# Patient Record
Sex: FEMALE | Race: BLACK OR AFRICAN AMERICAN | Employment: OTHER | URBAN - METROPOLITAN AREA
[De-identification: names, ages, dates, MRNs, and addresses within clinical notes are randomized per-mention and may not be internally consistent; named-entity substitution may affect disease eponyms.]

---

## 2017-01-10 ENCOUNTER — HOSPITAL ENCOUNTER (OUTPATIENT)
Dept: GENERAL RADIOLOGY | Age: 67
Discharge: HOME | End: 2017-01-10
Attending: FAMILY MEDICINE
Payer: MEDICARE

## 2017-01-10 ENCOUNTER — OFFICE VISIT (OUTPATIENT)
Dept: FAMILY MEDICINE CLINIC | Age: 67
End: 2017-01-10

## 2017-01-10 VITALS
WEIGHT: 161.4 LBS | HEIGHT: 62 IN | SYSTOLIC BLOOD PRESSURE: 125 MMHG | HEART RATE: 71 BPM | OXYGEN SATURATION: 97 % | BODY MASS INDEX: 29.7 KG/M2 | TEMPERATURE: 97 F | RESPIRATION RATE: 14 BRPM | DIASTOLIC BLOOD PRESSURE: 63 MMHG

## 2017-01-10 DIAGNOSIS — I10 ESSENTIAL HYPERTENSION: Primary | ICD-10-CM

## 2017-01-10 DIAGNOSIS — E78.00 PURE HYPERCHOLESTEROLEMIA: ICD-10-CM

## 2017-01-10 DIAGNOSIS — M25.562 LEFT KNEE PAIN, UNSPECIFIED CHRONICITY: ICD-10-CM

## 2017-01-10 DIAGNOSIS — Z00.00 MEDICARE ANNUAL WELLNESS VISIT, SUBSEQUENT: ICD-10-CM

## 2017-01-10 DIAGNOSIS — M05.79 RHEUMATOID ARTHRITIS INVOLVING MULTIPLE SITES WITH POSITIVE RHEUMATOID FACTOR (HCC): ICD-10-CM

## 2017-01-10 DIAGNOSIS — J45.20 MILD INTERMITTENT ASTHMA WITHOUT COMPLICATION: ICD-10-CM

## 2017-01-10 DIAGNOSIS — E11.9 TYPE 2 DIABETES MELLITUS WITHOUT COMPLICATION, WITHOUT LONG-TERM CURRENT USE OF INSULIN (HCC): ICD-10-CM

## 2017-01-10 DIAGNOSIS — K21.00 GASTROESOPHAGEAL REFLUX DISEASE WITH ESOPHAGITIS: ICD-10-CM

## 2017-01-10 DIAGNOSIS — E11.21 CONTROLLED TYPE 2 DIABETES MELLITUS WITH DIABETIC NEPHROPATHY, WITHOUT LONG-TERM CURRENT USE OF INSULIN (HCC): ICD-10-CM

## 2017-01-10 PROCEDURE — 73564 X-RAY EXAM KNEE 4 OR MORE: CPT

## 2017-01-10 RX ORDER — HYDROCHLOROTHIAZIDE 25 MG/1
25 TABLET ORAL DAILY
Qty: 90 TAB | Refills: 4 | Status: SHIPPED | OUTPATIENT
Start: 2017-01-10 | End: 2017-07-24 | Stop reason: SDUPTHER

## 2017-01-10 RX ORDER — LOSARTAN POTASSIUM 50 MG/1
50 TABLET ORAL DAILY
Qty: 90 TAB | Refills: 4 | Status: SHIPPED | OUTPATIENT
Start: 2017-01-10 | End: 2017-07-24 | Stop reason: SDUPTHER

## 2017-01-10 RX ORDER — SIMVASTATIN 40 MG/1
40 TABLET, FILM COATED ORAL
Qty: 90 TAB | Refills: 4 | Status: SHIPPED | OUTPATIENT
Start: 2017-01-10 | End: 2017-07-24 | Stop reason: SDUPTHER

## 2017-01-10 NOTE — PROGRESS NOTES
THE SUBSEQUENT MEDICARE ANNUAL WELLNESS VISIT PROGRESS NOTES    This is a Subsequent Medicare Annual Wellness Visit providing Personalized Prevention Plan Services (PPPS) (Performed 12 months after initial AWV and PPPS )    I have reviewed the patient's medical history in detail and updated the computerized patient record. Mitesh Raya is a 77 y.o.  female and presents for an subsequent annual wellness exam     Patient Active Problem List    Diagnosis Date Noted    Advance directive in chart 07/11/2016    Asthma 12/02/2013    Pure hypercholesterolemia 10/24/2011    GERD (gastroesophageal reflux disease) 04/29/2011    HTN (hypertension) 04/29/2011    Type 2 diabetes mellitus without complication ([facility] [address] 70/54/8001    Rheumatoid arthritis(714.0) 04/29/2011     Current Outpatient Prescriptions   Medication Sig Dispense Refill    hydrochlorothiazide (HYDRODIURIL) 25 mg tablet Take 1 Tab by mouth daily. 90 Tab 4    losartan (COZAAR) 50 mg tablet Take 1 Tab by mouth daily. 90 Tab 4    simvastatin (ZOCOR) 40 mg tablet Take 1 Tab by mouth nightly. 90 Tab 4    linagliptin (TRADJENTA) 5 mg tablet Take 1 Tab by mouth daily. 90 Tab 4    budesonide-formoterol (SYMBICORT) 160-4.5 mcg/actuation HFA inhaler Take 2 Puffs by inhalation two (2) times a day.        Allergies   Allergen Reactions    Percocet [Oxycodone-Acetaminophen] Nausea and Vomiting    Ultram [Tramadol] Swelling     Past Medical History   Diagnosis Date    Advance directive in chart 7/11/2016    Asthma 12/2/2013    GERD (gastroesophageal reflux disease) 4/29/2011    HTN (hypertension) 4/29/2011    Rheumatoid arthritis(714.0) 4/29/2011    Type II or unspecified type diabetes mellitus without mention of complication, not stated as uncontrolled 4/29/2011     Past Surgical History   Procedure Laterality Date    Hx breast biopsy Right 1970's     rt benign    Hx amor and bso  2004    Hx hysterectomy       Family History   Problem Relation Age of Onset    Hypertension Mother    Juve Winston Arthritis-rheumatoid Mother     Stroke Father      Social History   Substance Use Topics    Smoking status: Never Smoker    Smokeless tobacco: Not on file    Alcohol use No         ROS       All other systems reviewed and are negative. History     Past Medical History   Diagnosis Date    Advance directive in chart 7/11/2016    Asthma 12/2/2013    GERD (gastroesophageal reflux disease) 4/29/2011    HTN (hypertension) 4/29/2011    Rheumatoid arthritis(714.0) 4/29/2011    Type II or unspecified type diabetes mellitus without mention of complication, not stated as uncontrolled 4/29/2011      Past Surgical History   Procedure Laterality Date    Hx breast biopsy Right 1970's     rt benign    Hx amor and bso  2004    Hx hysterectomy       Current Outpatient Prescriptions   Medication Sig Dispense Refill    hydrochlorothiazide (HYDRODIURIL) 25 mg tablet Take 1 Tab by mouth daily. 90 Tab 4    losartan (COZAAR) 50 mg tablet Take 1 Tab by mouth daily. 90 Tab 4    simvastatin (ZOCOR) 40 mg tablet Take 1 Tab by mouth nightly. 90 Tab 4    linagliptin (TRADJENTA) 5 mg tablet Take 1 Tab by mouth daily. 90 Tab 4    budesonide-formoterol (SYMBICORT) 160-4.5 mcg/actuation HFA inhaler Take 2 Puffs by inhalation two (2) times a day.        Allergies   Allergen Reactions    Percocet [Oxycodone-Acetaminophen] Nausea and Vomiting    Ultram [Tramadol] Swelling     Family History   Problem Relation Age of Onset    Hypertension Mother    Juve Winston Arthritis-rheumatoid Mother     Stroke Father      Social History   Substance Use Topics    Smoking status: Never Smoker    Smokeless tobacco: Not on file    Alcohol use No     Patient Active Problem List   Diagnosis Code    GERD (gastroesophageal reflux disease) K21.9    HTN (hypertension) I10    Type 2 diabetes mellitus without complication (HCC) F15.4    Rheumatoid arthritis(714.0)     Pure hypercholesterolemia E78.00    Asthma J45.909    Advance directive in chart Z78.9       Health Maintenance History  Immunizations reviewed, dtap utd  , pneumovax utd , flu refused , zoster refused   Colonoscopy: utd ,   Chest CT : utd ,  Eye exam: utd   Mammo utd   Dexascan utd     Health Care Directive or Living Will: yes    Depression Risk Factor Screening:      Patient Health Questionnaire (PHQ-2)   Over the last 2 weeks, how often have you been bothered by any of the following problems? · Little interest or pleasure in doing things? · Not at all. [0]  · Feeling down, depressed, or hopeless? · Not at all. [0]    Total Score: 0/6  PHQ-2 Assessment Scoring:   A score of 2 or more requires further screening with the PHQ-9    Alcohol Risk Factor Screening:     Women: On any occasion during the past 3 months, have you had more than 3 drinks containing alcohol? Do you average more than 7 drinks per week? Men: On any occasion during the past 3 months, have you had more than 4 drinks containing alcohol? Do you average more than 14 drinks per week? Functional Ability and Level of Safety:     Hearing Loss    mild    Activities of Daily Living   Self-care. Requires assistance with: no ADLs    Fall Risk   No fall risk factors    Abuse Screen   None      Examination   Physical Examination  Vitals:    01/10/17 1235 01/10/17 1241   BP: 140/58 125/63   Pulse: 79 71   Resp: 14    Temp: 97 °F (36.1 °C)    TempSrc: Oral    SpO2: 97%    Weight: 161 lb 6.4 oz (73.2 kg)    Height: 5' 2\" (1.575 m)    PainSc:   0 - No pain      Body mass index is 29.52 kg/(m^2).     Evaluation of Cognitive Function:  Mood/affect:apprpriate   Appearance: well groomed   Family member/caregiver input: na     alert, well appearing, and in no distress, oriented to person, place, and time and normal appearing weight    Patient Care Team:  Julia Silva DO as PCP - General (Family Practice)  Susy Prader, MD (Rheumatology)  Tiffany Lopez MD (Internal Medicine)  Luly Montero Larissa Acuna MD (Gastroenterology)    Advice/Referrals/Counseling/Plan:   Education and counseling provided:  Are appropriate based on today's review and evaluation  Influenza Vaccine  Include in education list (weight loss, physical activity, smoking cessation, fall prevention, and nutrition)  current treatment plan is effective, no change in therapy. I have discussed the diagnosis with the patient and the intended plan as seen in the above orders. The patient has received an after-visit summary and questions were answered concerning future plans. I have discussed medication side effects and warnings with the patient as well. I have reviewed the plan of care with the patient, accepted their input and they are in agreement with the treatment goals. Follow-up Disposition: Not on File    _____________________________________________________________    Problem Assessment    for treatment of No chief complaint on file. SUBJECTIVE      Cardiovascular Review:  The patient has diabetes, hypertension and hyperlipidemia. Diet and Lifestyle: not attempting to follow a low fat, low cholesterol diet, not attempting to follow a low sodium diet  Home BP Monitoring: is not measured at home. Pertinent ROS: taking medications as instructed, no medication side effects noted, no TIA's, no chest pain on exertion, no dyspnea on exertion, no swelling of ankles. Asthma Review:  The patient is being seen for follow up of asthma, not currently in exacerbation. Asthma symptoms occur: daily, infrequently. Wheezing when present is described as mild and easily relieved with rescue bronchodilator. Current limitations in activity from asthma: none. Number of days of school or work missed in the last month: 0. Frequency of use of quick-relief meds: 0. Regimen compliance: The patient reports adherence to this regimen. Patient does not smoke cigarettes.   Osteoarthritis and Chronic Pain:  Patient has rheumatoid arthritis, primarily affecting the diffuse. Symptoms onset: problem is longstanding. Rheumatological ROS: no current joint or muscle symptoms, essentially pain-free. Response to treatment plan: stable.      Additional Concerns: 0         Mental status - alert, oriented to person, place, and time  Eyes - pupils equal and reactive, extraocular eye movements intact  Ears - bilateral TM's and external ear canals normal  Nose - normal and patent, no erythema, discharge or polyps  Mouth - mucous membranes moist, pharynx normal without lesions  Neck - supple, no significant adenopathy  Lymphatics - no palpable lymphadenopathy, no hepatosplenomegaly  Chest - clear to auscultation, no wheezes, rales or rhonchi, symmetric air entry  Heart - normal rate, regular rhythm, normal S1, S2, no murmurs, rubs, clicks or gallops  Abdomen - soft, nontender, nondistended, no masses or organomegaly  Breasts - breasts appear normal, no suspicious masses, no skin or nipple changes or axillary nodes  Back exam - full range of motion, no tenderness, palpable spasm or pain on motion  Neurological - alert, oriented, normal speech, no focal findings or movement disorder noted  Musculoskeletal - no joint tenderness, deformity or swelling  Extremities - peripheral pulses normal, no pedal edema, no clubbing or cyanosis  Skin - normal coloration and turgor, no rashes, no suspicious skin lesions noted  Diabetic foot exam:     Left: Reflexes 2+     Vibratory sensation normal    Proprioception normal   Sharp/dull discrimination normal    Filament test normal sensation with micro filament   Pulse DP: 2+ (normal)   Pulse PT: 2+ (normal)   Deformities: None  Right: Reflexes 2+   Vibratory sensation normal   Proprioception normal   Sharp/dull discrimination normal   Filament test normal sensation with micro filament   Pulse DP: 2+ (normal)   Pulse PT: 2+ (normal)   Deformities: None      LABS   Component      Latest Ref Rng & Units 12/15/2016 12/15/2016 12/15/2016          11:37 AM 11:36 AM 11:36 AM   WBC      4.6 - 13.2 K/uL      RBC      4.20 - 5.30 M/uL      HGB      12.0 - 16.0 g/dL      HCT      35.0 - 45.0 %      MCV      74.0 - 97.0 FL      MCH      24.0 - 34.0 PG      MCHC      31.0 - 37.0 g/dL      RDW      11.6 - 14.5 %      PLATELET      840 - 413 K/uL      MPV      9.2 - 11.8 FL      NEUTROPHILS      40 - 73 %      LYMPHOCYTES      21 - 52 %      MONOCYTES      3 - 10 %      EOSINOPHILS      0 - 5 %      BASOPHILS      0 - 2 %      ABS. NEUTROPHILS      1.8 - 8.0 K/UL      ABS. LYMPHOCYTES      0.9 - 3.6 K/UL      ABS. MONOCYTES      0.05 - 1.2 K/UL      ABS. EOSINOPHILS      0.0 - 0.4 K/UL      ABS. BASOPHILS      0.0 - 0.06 K/UL      DF            Sodium      136 - 145 mmol/L      Potassium      3.5 - 5.5 mmol/L      Chloride      100 - 108 mmol/L      CO2      21 - 32 mmol/L      Anion gap      3.0 - 18 mmol/L      Glucose      74 - 99 mg/dL      BUN      7.0 - 18 MG/DL      Creatinine      0.6 - 1.3 MG/DL      BUN/Creatinine ratio      12 - 20        GFR est AA      >60 ml/min/1.73m2      GFR est non-AA      >60 ml/min/1.73m2      Calcium      8.5 - 10.1 MG/DL      Bilirubin, total      0.2 - 1.0 MG/DL      ALT      13 - 56 U/L      AST      15 - 37 U/L      Alk.  phosphatase      45 - 117 U/L      Protein, total      6.4 - 8.2 g/dL      Albumin      3.4 - 5.0 g/dL      Globulin      2.0 - 4.0 g/dL      A-G Ratio      0.8 - 1.7        Color            Appearance            Specific gravity      1.005 - 1.030        pH (UA)      5.0 - 8.0        Protein      NEG mg/dL      Glucose      NEG mg/dL      Ketone      NEG mg/dL      Bilirubin      NEG        Blood      NEG        Urobilinogen      0.2 - 1.0 EU/dL      Nitrites      NEG        Leukocyte Esterase      NEG        Cholesterol, total      <200 MG/DL      Triglyceride      <150 MG/DL      HDL Cholesterol      40 - 60 MG/DL      LDL, calculated      0 - 100 MG/DL      VLDL, calculated MG/DL      CHOL/HDL Ratio      0 - 5.0        WBC      0 - 4 /hpf   4 to 8   RBC      0 - 5 /hpf   0   Epithelial cells      0 - 5 /lpf   1+   Bacteria      NEG /hpf   NEGATIVE   Microalbumin,urine random      0 - 3.0 MG/DL 1.20     Creatinine, urine      30 - 125 mg/dL 218.89 (H)     Microalbumin/Creat. Ratio      0 - 30 mg/g 5     Hemoglobin A1c, (calculated)      4.2 - 5.6 %      Est. average glucose      mg/dL      TSH      0.36 - 3.74 uIU/mL      Calcitriol (Vit D 1, 25 di-OH)      19.9 - 79.3 pg/mL  59.7      Component      Latest Ref Rng & Units 12/15/2016 12/15/2016 12/15/2016          11:36 AM 11:36 AM 11:36 AM   WBC      4.6 - 13.2 K/uL      RBC      4.20 - 5.30 M/uL      HGB      12.0 - 16.0 g/dL      HCT      35.0 - 45.0 %      MCV      74.0 - 97.0 FL      MCH      24.0 - 34.0 PG      MCHC      31.0 - 37.0 g/dL      RDW      11.6 - 14.5 %      PLATELET      846 - 317 K/uL      MPV      9.2 - 11.8 FL      NEUTROPHILS      40 - 73 %      LYMPHOCYTES      21 - 52 %      MONOCYTES      3 - 10 %      EOSINOPHILS      0 - 5 %      BASOPHILS      0 - 2 %      ABS. NEUTROPHILS      1.8 - 8.0 K/UL      ABS. LYMPHOCYTES      0.9 - 3.6 K/UL      ABS. MONOCYTES      0.05 - 1.2 K/UL      ABS. EOSINOPHILS      0.0 - 0.4 K/UL      ABS. BASOPHILS      0.0 - 0.06 K/UL      DF            Sodium      136 - 145 mmol/L      Potassium      3.5 - 5.5 mmol/L      Chloride      100 - 108 mmol/L      CO2      21 - 32 mmol/L      Anion gap      3.0 - 18 mmol/L      Glucose      74 - 99 mg/dL      BUN      7.0 - 18 MG/DL      Creatinine      0.6 - 1.3 MG/DL      BUN/Creatinine ratio      12 - 20        GFR est AA      >60 ml/min/1.73m2      GFR est non-AA      >60 ml/min/1.73m2      Calcium      8.5 - 10.1 MG/DL      Bilirubin, total      0.2 - 1.0 MG/DL      ALT      13 - 56 U/L      AST      15 - 37 U/L      Alk.  phosphatase      45 - 117 U/L      Protein, total      6.4 - 8.2 g/dL      Albumin      3.4 - 5.0 g/dL Globulin      2.0 - 4.0 g/dL      A-G Ratio      0.8 - 1.7        Color       YELLOW     Appearance       CLEAR     Specific gravity      1.005 - 1.030   1.020     pH (UA)      5.0 - 8.0   7.0     Protein      NEG mg/dL NEGATIVE     Glucose      NEG mg/dL NEGATIVE     Ketone      NEG mg/dL NEGATIVE     Bilirubin      NEG   NEGATIVE     Blood      NEG   NEGATIVE     Urobilinogen      0.2 - 1.0 EU/dL 0.2     Nitrites      NEG   NEGATIVE     Leukocyte Esterase      NEG   MODERATE (A)     Cholesterol, total      <200 MG/DL      Triglyceride      <150 MG/DL      HDL Cholesterol      40 - 60 MG/DL      LDL, calculated      0 - 100 MG/DL      VLDL, calculated      MG/DL      CHOL/HDL Ratio      0 - 5.0        WBC      0 - 4 /hpf      RBC      0 - 5 /hpf      Epithelial cells      0 - 5 /lpf      Bacteria      NEG /hpf      Microalbumin,urine random      0 - 3.0 MG/DL      Creatinine, urine      30 - 125 mg/dL      Microalbumin/Creat. Ratio      0 - 30 mg/g      Hemoglobin A1c, (calculated)      4.2 - 5.6 %  6.0 (H)    Est. average glucose      mg/dL  126    TSH      0.36 - 3.74 uIU/mL   0.91   Calcitriol (Vit D 1, 25 di-OH)      19.9 - 79.3 pg/mL        Component      Latest Ref Rng & Units 12/15/2016 12/15/2016 12/15/2016          11:36 AM 11:36 AM 11:36 AM   WBC      4.6 - 13.2 K/uL   4.4 (L)   RBC      4.20 - 5.30 M/uL   4.17 (L)   HGB      12.0 - 16.0 g/dL   12.5   HCT      35.0 - 45.0 %   37.9   MCV      74.0 - 97.0 FL   90.9   MCH      24.0 - 34.0 PG   30.0   MCHC      31.0 - 37.0 g/dL   33.0   RDW      11.6 - 14.5 %   13.2   PLATELET      576 - 667 K/uL   237   MPV      9.2 - 11.8 FL   9.2   NEUTROPHILS      40 - 73 %   54   LYMPHOCYTES      21 - 52 %   35   MONOCYTES      3 - 10 %   7   EOSINOPHILS      0 - 5 %   3   BASOPHILS      0 - 2 %   1   ABS. NEUTROPHILS      1.8 - 8.0 K/UL   2.4   ABS. LYMPHOCYTES      0.9 - 3.6 K/UL   1.6   ABS. MONOCYTES      0.05 - 1.2 K/UL   0.3   ABS.  EOSINOPHILS      0.0 - 0.4 K/UL 0. 1   ABS. BASOPHILS      0.0 - 0.06 K/UL   0.0   DF         AUTOMATED   Sodium      136 - 145 mmol/L  139    Potassium      3.5 - 5.5 mmol/L  4.2    Chloride      100 - 108 mmol/L  102    CO2      21 - 32 mmol/L  32    Anion gap      3.0 - 18 mmol/L  5    Glucose      74 - 99 mg/dL  89    BUN      7.0 - 18 MG/DL  15    Creatinine      0.6 - 1.3 MG/DL  1.00    BUN/Creatinine ratio      12 - 20    15    GFR est AA      >60 ml/min/1.73m2  >60    GFR est non-AA      >60 ml/min/1.73m2  55 (L)    Calcium      8.5 - 10.1 MG/DL  8.9    Bilirubin, total      0.2 - 1.0 MG/DL  0.5    ALT      13 - 56 U/L  20    AST      15 - 37 U/L  20    Alk. phosphatase      45 - 117 U/L  70    Protein, total      6.4 - 8.2 g/dL  7.8    Albumin      3.4 - 5.0 g/dL  4.1    Globulin      2.0 - 4.0 g/dL  3.7    A-G Ratio      0.8 - 1.7    1.1    Color            Appearance            Specific gravity      1.005 - 1.030        pH (UA)      5.0 - 8.0        Protein      NEG mg/dL      Glucose      NEG mg/dL      Ketone      NEG mg/dL      Bilirubin      NEG        Blood      NEG        Urobilinogen      0.2 - 1.0 EU/dL      Nitrites      NEG        Leukocyte Esterase      NEG        Cholesterol, total      <200 MG/     Triglyceride      <150 MG/DL 74     HDL Cholesterol      40 - 60 MG/DL 52     LDL, calculated      0 - 100 MG/DL 53.2     VLDL, calculated      MG/DL 14.8     CHOL/HDL Ratio      0 - 5.0   2.3     WBC      0 - 4 /hpf      RBC      0 - 5 /hpf      Epithelial cells      0 - 5 /lpf      Bacteria      NEG /hpf      Microalbumin,urine random      0 - 3.0 MG/DL      Creatinine, urine      30 - 125 mg/dL      Microalbumin/Creat.  Ratio      0 - 30 mg/g      Hemoglobin A1c, (calculated)      4.2 - 5.6 %      Est. average glucose      mg/dL      TSH      0.36 - 3.74 uIU/mL      Calcitriol (Vit D 1, 25 di-OH)      19.9 - 79.3 pg/mL        TESTS  EKG  NSR  sjpirometry with mod severe obstructive dz      Assessment/Plan:      Diabetes - stable  Hypertension - stable  Hyperlipidemia - stable  Asthma ongoing  Rheumatoid arthritsi ongoing  Left knee pain -- will check x ray and f/u  2 oer 3 wks            Lab review: labs are reviewed, up to date and normal

## 2017-01-10 NOTE — MR AVS SNAPSHOT
Visit Information Date & Time Provider Department Dept. Phone Encounter #  
 1/10/2017 12:30 PM Ellen Zurita., [address] [phone] [id number] Follow-up Instructions Return in about 2 weeks (around 1/24/2017) for rov. Upcoming Health Maintenance Date Due  
 FOOT EXAM Q1 9/30/2016 Pneumococcal 65+ Low/Medium Risk (2 of 2 - PPSV23) 10/24/2016 EYE EXAM RETINAL OR DILATED Q1 11/5/2016 MEDICARE YEARLY EXAM 1/4/2017 HEMOGLOBIN A1C Q6M 6/15/2017 GLAUCOMA SCREENING Q2Y 11/5/2017 MICROALBUMIN Q1 12/15/2017 LIPID PANEL Q1 12/15/2017 BREAST CANCER SCRN MAMMOGRAM 4/18/2018 DTaP/Tdap/Td series (2 - Td) 10/24/2021 Allergies as of 1/10/2017  Review Complete On: 1/10/2017 By: Ellen Zurita., DO Severity Noted Reaction Type Reactions Percocet [Oxycodone-acetaminophen]  01/24/2012    Nausea and Vomiting Ultram [Tramadol]  04/29/2011    Swelling Current Immunizations  Reviewed on 12/16/2014 Name Date Influenza High Dose Vaccine PF 9/30/2015 Influenza Vaccine 12/16/2014, 12/2/2013  1:02 PM  
 Influenza Vaccine Split 11/27/2012, 10/24/2011 Pneumococcal Conjugate (PCV-13) 9/30/2015 Pneumococcal Vaccine (Unspecified Type) 10/24/2011 TDAP Vaccine 10/24/2011 Not reviewed this visit You Were Diagnosed With   
  
 Codes Comments Essential hypertension    -  Primary ICD-10-CM: I10 
ICD-9-CM: 401.9 Medicare annual wellness visit, subsequent     ICD-10-CM: Z00.00 ICD-9-CM: V70.0 Mild intermittent asthma without complication     TT-[id number]: J45.20 ICD-9-CM: 493.90 Type 2 diabetes mellitus without complication, without long-term current use of insulin (HCC)     ICD-10-CM: E11.9 ICD-9-CM: 250.00 Gastroesophageal reflux disease with esophagitis     ICD-10-CM: K21.0 ICD-9-CM: 530.11  Rheumatoid arthritis involving multiple sites with positive rheumatoid factor (HCC)     ICD-10-CM: M05.79 
 ICD-9-CM: 714.0 Left knee pain, unspecified chronicity     ICD-10-CM: E36.291 ICD-9-CM: 719.46 Controlled type 2 diabetes mellitus with diabetic nephropathy, without long-term current use of insulin (HCC)     ICD-10-CM: E11.21 
ICD-9-CM: 250.40, 583.81 Pure hypercholesterolemia     ICD-10-CM: E78.00 ICD-9-CM: 272.0 Vitals BP Pulse Temp Resp Height(growth percentile) Weight(growth percentile) 125/63 (BP 1 Location: Right arm, BP Patient Position: Sitting) 71 97 °F (36.1 °C) (Oral) 14 5' 2\" (1.575 m) 161 lb 6.4 oz (73.2 kg) SpO2 BMI OB Status Smoking Status 97% 29.52 kg/m2 Hysterectomy Never Smoker Vitals History BMI and BSA Data Body Mass Index Body Surface Area  
 29.52 kg/m 2 1.79 m 2 Preferred Pharmacy Pharmacy Name Phone [facility] [address], [address] [facility] [phone] Your Updated Medication List  
  
   
This list is accurate as of: 1/10/17  1:31 PM.  Always use your most recent med list.  
  
  
  
  
 budesonide-formoterol 160-4.5 mcg/actuation HFA inhaler Commonly known as:  SYMBICORT Take 2 Puffs by inhalation two (2) times a day. hydroCHLOROthiazide 25 mg tablet Commonly known as:  HYDRODIURIL Take 1 Tab by mouth daily. linagliptin 5 mg tablet Commonly known as:  Katelin Addison Take 1 Tab by mouth daily. losartan 50 mg tablet Commonly known as:  COZAAR Take 1 Tab by mouth daily. simvastatin 40 mg tablet Commonly known as:  ZOCOR Take 1 Tab by mouth nightly. Prescriptions Sent to Pharmacy Refills  
 hydroCHLOROthiazide (HYDRODIURIL) 25 mg tablet 4 Sig: Take 1 Tab by mouth daily. Class: Normal  
 Pharmacy: [facility] [address], [address] Ph #: [phone] Route: Oral  
 losartan (COZAAR) 50 mg tablet 4 Sig: Take 1 Tab by mouth daily.   
 Class: Normal  
 Pharmacy: [id number], [address] Ph #: [phone] Route: Oral  
 simvastatin (ZOCOR) 40 mg tablet 4 Sig: Take 1 Tab by mouth nightly. Class: Normal  
 Pharmacy: [address], [address] Ph #: [phone] Route: Oral  
 linagliptin (TRADJENTA) 5 mg tablet 4 Sig: Take 1 Tab by mouth daily. Class: Normal  
 Pharmacy: [address], [address] Ph #: [phone] Route: Oral  
  
We Performed the Following AMB POC EKG ROUTINE W/ 12 LEADS, INTER & REP [74486 CPT(R)] Comments:  
 Verbal order with read back per Dr. Diane Mitchell request  
 AMB POC SPIROMETRY W/O BRONCHODILATOR [63681 CPT(R)]  DIABETES FOOT EXAM [HM7 Custom] Follow-up Instructions Return in about 2 weeks (around 1/24/2017) for rov. To-Do List   
 01/10/2017 Imaging:  XR KNEE LT MIN 4 V   
  
 04/19/2017 11:30 AM  
  Appointment with [facility] 1 at [facility] ([phone]) OUTSIDE FILMS  - Any outside films related to the study being scheduled should be brought with you on the day of the exam.  If this cannot be done there may be a delay in the reading of the study. MEDICATIONS  - Patient must bring a complete list of all medications currently taking to include prescriptions, over-the-counter meds, herbals, vitamins & any dietary supplements  GENERAL INSTRUCTIONS  - On the day of your exam do not use any bath powder, deodorant or lotions on the armpit area. -Tenderness of breasts may cause an increase of discomfort during procedure. If you are experiencing breast tenderness on the day of your appointment and would like to reschedule, please call [phone]. Introducing [facility] [place] & HEALTH SERVICES! [employer] introduces [employer] patient portal. Now you can access parts of your medical record, email your doctor's office, and request medication refills online. 1. In your internet browser, go to [email or web address] 2. Click on the First Time User? Click Here link in the Sign In box. You will see the New Member Sign Up page. 3. Enter your Twylah Access Code exactly as it appears below. You will not need to use this code after youve completed the sign-up process. If you do not sign up before the expiration date, you must request a new code. · Twylah Access Code: [email or web address] Expires: 3/6/2017  2:47 PM 
 
4. Enter the last four digits of your Social Security Number (xxxx) and Date of Birth (mm/dd/yyyy) as indicated and click Submit. You will be taken to the next sign-up page. 5. Create a MeSixtyt ID. This will be your Twylah login ID and cannot be changed, so think of one that is secure and easy to remember. 6. Create a Twylah password. You can change your password at any time. 7. Enter your Password Reset Question and Answer. This can be used at a later time if you forget your password. 8. Enter your e-mail address. You will receive e-mail notification when new information is available in [address] 9. Click Sign Up. You can now view and download portions of your medical record. 10. Click the Download Summary menu link to download a portable copy of your medical information. If you have questions, please visit the Frequently Asked Questions section of the Twylah website. Remember, Twylah is NOT to be used for urgent needs. For medical emergencies, dial 911. Now available from your iPhone and Android! Please provide this summary of care documentation to your next provider. Your primary care clinician is listed as [address]. If you have any questions after today's visit, please call [phone].

## 2017-01-24 ENCOUNTER — OFFICE VISIT (OUTPATIENT)
Dept: FAMILY MEDICINE CLINIC | Age: 67
End: 2017-01-24

## 2017-01-24 VITALS
SYSTOLIC BLOOD PRESSURE: 150 MMHG | BODY MASS INDEX: 30.91 KG/M2 | TEMPERATURE: 97.1 F | OXYGEN SATURATION: 95 % | WEIGHT: 168 LBS | DIASTOLIC BLOOD PRESSURE: 57 MMHG | RESPIRATION RATE: 16 BRPM | HEIGHT: 62 IN | HEART RATE: 69 BPM

## 2017-01-24 DIAGNOSIS — M19.90 ARTHRITIS: Primary | ICD-10-CM

## 2017-01-24 DIAGNOSIS — Z23 ENCOUNTER FOR IMMUNIZATION: ICD-10-CM

## 2017-01-24 NOTE — PROGRESS NOTES
Veena Pope is a 77 y.o. female who presents for routine immunizations. She denies any symptoms , reactions or allergies that would exclude them from being immunized today. Risks and adverse reactions were discussed and the VIS was given to them. All questions were addressed. She was observed for 15 min post injection. There were no reactions observed.     Daren Patrick LPN

## 2017-01-24 NOTE — PROGRESS NOTES
Santosh Acosta is a 77 y.o.  female and presents with    Chief Complaint   Patient presents with    Knee Pain           Subjective:    Osteoarthritis and Chronic Pain:  Patient has osteoarthritis, primarily affecting the left knee   Symptoms onset: problem is longstanding. Rheumatological ROS: no current joint or muscle symptoms, essentially pain-free. Response to treatment plan: stable. Additional Concerns:          Patient Active Problem List    Diagnosis Date Noted    Advance directive in chart 07/11/2016    Asthma 12/02/2013    Pure hypercholesterolemia 10/24/2011    GERD (gastroesophageal reflux disease) 04/29/2011    HTN (hypertension) 04/29/2011    Type [facility] 47/74/3696    Rheumatoid arthritis involving multiple sites with positive rheumatoid factor ([facility] 04/29/2011     Current Outpatient Prescriptions   Medication Sig Dispense Refill    hydroCHLOROthiazide (HYDRODIURIL) 25 mg tablet Take 1 Tab by mouth daily. 90 Tab 4    losartan (COZAAR) 50 mg tablet Take 1 Tab by mouth daily. 90 Tab 4    simvastatin (ZOCOR) 40 mg tablet Take 1 Tab by mouth nightly. 90 Tab 4    linagliptin (TRADJENTA) 5 mg tablet Take 1 Tab by mouth daily. 90 Tab 4    budesonide-formoterol (SYMBICORT) 160-4.5 mcg/actuation HFA inhaler Take 2 Puffs by inhalation two (2) times a day.        Allergies   Allergen Reactions    Percocet [Oxycodone-Acetaminophen] Nausea and Vomiting    Ultram [Tramadol] Swelling     Past Medical History   Diagnosis Date    Advance directive in chart 7/11/2016    Asthma 12/2/2013    GERD (gastroesophageal reflux disease) 4/29/2011    HTN (hypertension) 4/29/2011    Rheumatoid arthritis(714.0) 4/29/2011    Type II or unspecified type diabetes mellitus without mention of complication, not stated as uncontrolled 4/29/2011     Past Surgical History   Procedure Laterality Date    Hx breast biopsy Right 1970's     rt benign    Hx amor and bso  2004    Hx hysterectomy       Family History   Problem Relation Age of Onset    Hypertension Mother    [employer] Arthritis-rheumatoid Mother     Stroke Father      Social History   Substance Use Topics    Smoking status: Never Smoker    Smokeless tobacco: Never Used    Alcohol use No       ROS       All other systems reviewed and are negative. Objective:  Vitals:    01/24/17 1025   BP: 150/57   Pulse: 69   Resp: 16   Temp: 97.1 °F (36.2 °C)   TempSrc: Oral   SpO2: 95%   Weight: 168 lb (76.2 kg)   Height: 5' 2\" (1.575 m)   PainSc:   0 - No pain                 alert, well appearing, and in no distress, oriented to person, place, and time and normal appearing weight  Musculoskeletal - no joint tenderness, deformity or swelling        LABS     TESTS  Xray reviewed with pt  No significant deg changes in knee  Assessment/Plan:    Osteoarthritis mild knee  Avoid trauma. Doesn't want pain meds at this time      Lab review:       I have discussed the diagnosis with the patient and the intended plan as seen in the above orders. The patient has received an after-visit summary and questions were answered concerning future plans. I have discussed medication side effects and warnings with the patient as well. I have reviewed the plan of care with the patient, accepted their input and they are in agreement with the treatment goals.      Follow-up Disposition: Not on File

## 2017-01-24 NOTE — MR AVS SNAPSHOT
Visit Information Date & Time Provider Department Dept. Phone Encounter #  
 1/24/2017 10:30 AM Sunny Rayo [phone] [id number] Follow-up Instructions Return in about 6 months (around 7/24/2017) for rov, [facility] next visit. Follow-up and Disposition History Upcoming Health Maintenance Date Due Pneumococcal 65+ Low/Medium Risk (2 of 2 - PPSV23) 10/24/2016 EYE EXAM RETINAL OR DILATED Q1 11/5/2016 HEMOGLOBIN A1C Q6M 6/15/2017 GLAUCOMA SCREENING Q2Y 11/5/2017 MICROALBUMIN Q1 12/15/2017 LIPID PANEL Q1 12/15/2017 FOOT EXAM Q1 1/10/2018 MEDICARE YEARLY EXAM 1/11/2018 BREAST CANCER SCRN MAMMOGRAM 4/18/2018 DTaP/Tdap/Td series (2 - Td) 10/24/2021 Allergies as of 1/24/2017  Review Complete On: 1/24/2017 By: Sim Hollis., DO Severity Noted Reaction Type Reactions Percocet [Oxycodone-acetaminophen]  01/24/2012    Nausea and Vomiting Ultram [Tramadol]  04/29/2011    Swelling Current Immunizations  Reviewed on 12/16/2014 Name Date Influenza High Dose Vaccine PF 9/30/2015 Influenza Vaccine 12/16/2014, 12/2/2013  1:02 PM  
 Influenza Vaccine Split 11/27/2012, 10/24/2011 Pneumococcal Conjugate (PCV-13) 9/30/2015 Pneumococcal Vaccine (Unspecified Type) 10/24/2011 TDAP Vaccine 10/24/2011 Not reviewed this visit You Were Diagnosed With   
  
 Codes Comments Arthritis    -  Primary ICD-10-CM: M19.90 ICD-9-CM: 716.90 Vitals BP Pulse Temp Resp Height(growth percentile) Weight(growth percentile) 150/57 (BP 1 Location: Left arm, BP Patient Position: Sitting) 69 97.1 °F (36.2 °C) (Oral) 16 5' 2\" (1.575 m) 168 lb (76.2 kg) SpO2 BMI OB Status Smoking Status 95% 30.73 kg/m2 Hysterectomy Never Smoker BMI and BSA Data Body Mass Index Body Surface Area 30.73 kg/m 2 1.83 m 2 Preferred Pharmacy Pharmacy Name Phone [facility] [address], [place] [phone] Your Updated Medication List  
  
   
This list is accurate as of: 1/24/17 10:36 AM.  Always use your most recent med list.  
  
  
  
  
 budesonide-formoterol 160-4.5 mcg/actuation HFA inhaler Commonly known as:  SYMBICORT Take 2 Puffs by inhalation two (2) times a day. hydroCHLOROthiazide 25 mg tablet Commonly known as:  HYDRODIURIL Take 1 Tab by mouth daily. linagliptin 5 mg tablet Commonly known as:  Neita Salle Take 1 Tab by mouth daily. losartan 50 mg tablet Commonly known as:  COZAAR Take 1 Tab by mouth daily. simvastatin 40 mg tablet Commonly known as:  ZOCOR Take 1 Tab by mouth nightly. Follow-up Instructions Return in about 6 months (around 7/24/2017) for rov, [facility] next visit. To-Do List   
 04/19/2017 11:30 AM  
  Appointment with [facility] 1 at [facility] ([phone]) OUTSIDE FILMS  - Any outside films related to the study being scheduled should be brought with you on the day of the exam.  If this cannot be done there may be a delay in the reading of the study. MEDICATIONS  - Patient must bring a complete list of all medications currently taking to include prescriptions, over-the-counter meds, herbals, vitamins & any dietary supplements  GENERAL INSTRUCTIONS  - On the day of your exam do not use any bath powder, deodorant or lotions on the armpit area. -Tenderness of breasts may cause an increase of discomfort during procedure. If you are experiencing breast tenderness on the day of your appointment and would like to reschedule, please call [phone]. Introducing [facility] & HEALTH SERVICES! Romayne Duster introduces [employer] patient portal. Now you can access parts of your medical record, email your doctor's office, and request medication refills online. 1. In your internet browser, go to [email or web address] 2. Click on the First Time User? Click Here link in the Sign In box. You will see the New Member Sign Up page. 3. Enter your [employer] Access Code exactly as it appears below. You will not need to use this code after youve completed the sign-up process. If you do not sign up before the expiration date, you must request a new code. · [employer] Access Code: [email or web address] Expires: 3/6/2017  2:47 PM 
 
4. Enter the last four digits of your Social Security Number (xxxx) and Date of Birth (mm/dd/yyyy) as indicated and click Submit. You will be taken to the next sign-up page. 5. Create a [employer] ID. This will be your [employer] login ID and cannot be changed, so think of one that is secure and easy to remember. 6. Create a [employer] password. You can change your password at any time. 7. Enter your Password Reset Question and Answer. This can be used at a later time if you forget your password. 8. Enter your e-mail address. You will receive e-mail notification when new information is available in [address] 9. Click Sign Up. You can now view and download portions of your medical record. 10. Click the Download Summary menu link to download a portable copy of your medical information. If you have questions, please visit the Frequently Asked Questions section of the Atl[employer] website. Remember, Atl[employer]ian is NOT to be used for urgent needs. For medical emergencies, dial 911. Now available from your iPhone and Android! Please provide this summary of care documentation to your next provider. Your primary care clinician is listed as [address]. If you have any questions after today's visit, please call [phone].

## 2017-01-24 NOTE — PROGRESS NOTES
1. Have you been to the ER, urgent care clinic since your last visit? Hospitalized since your last visit? No    2. Have you seen or consulted any other health care providers outside of the [address] [place] [address] since your last visit? Include any pap smears or colon screening.  No

## 2017-04-19 ENCOUNTER — HOSPITAL ENCOUNTER (OUTPATIENT)
Dept: MAMMOGRAPHY | Age: 67
Discharge: HOME | End: 2017-04-19
Attending: FAMILY MEDICINE
Payer: MEDICARE

## 2017-04-19 DIAGNOSIS — Z12.31 VISIT FOR SCREENING MAMMOGRAM: ICD-10-CM

## 2017-04-19 PROCEDURE — 77063 BREAST TOMOSYNTHESIS BI: CPT

## 2017-05-04 ENCOUNTER — HOSPITAL ENCOUNTER (OUTPATIENT)
Dept: CT IMAGING | Age: 67
Discharge: HOME | End: 2017-05-04
Attending: INTERNAL MEDICINE
Payer: MEDICARE

## 2017-05-04 DIAGNOSIS — J98.4 DISORDER OF LUNG: ICD-10-CM

## 2017-05-04 PROCEDURE — 71250 CT THORAX DX C-: CPT

## 2017-05-24 ENCOUNTER — HOSPITAL ENCOUNTER (OUTPATIENT)
Dept: NON INVASIVE DIAGNOSTICS | Age: 67
Discharge: HOME | End: 2017-05-24
Attending: INTERNAL MEDICINE
Payer: MEDICARE

## 2017-05-24 DIAGNOSIS — R06.00 DYSPNEA, UNSPECIFIED: ICD-10-CM

## 2017-05-24 PROCEDURE — 93306 TTE W/DOPPLER COMPLETE: CPT

## 2017-07-24 ENCOUNTER — OFFICE VISIT (OUTPATIENT)
Dept: FAMILY MEDICINE CLINIC | Age: 67
End: 2017-07-24

## 2017-07-24 ENCOUNTER — TELEPHONE (OUTPATIENT)
Dept: PULMONOLOGY | Age: 67
End: 2017-07-24

## 2017-07-24 VITALS
OXYGEN SATURATION: 93 % | HEART RATE: 72 BPM | SYSTOLIC BLOOD PRESSURE: 139 MMHG | WEIGHT: 171.4 LBS | BODY MASS INDEX: 31.54 KG/M2 | RESPIRATION RATE: 16 BRPM | TEMPERATURE: 97.5 F | HEIGHT: 62 IN | DIASTOLIC BLOOD PRESSURE: 70 MMHG

## 2017-07-24 DIAGNOSIS — E11.21 CONTROLLED TYPE 2 DIABETES MELLITUS WITH DIABETIC NEPHROPATHY, WITHOUT LONG-TERM CURRENT USE OF INSULIN (HCC): ICD-10-CM

## 2017-07-24 DIAGNOSIS — M81.0 AGE-RELATED OSTEOPOROSIS WITHOUT CURRENT PATHOLOGICAL FRACTURE: ICD-10-CM

## 2017-07-24 DIAGNOSIS — K21.00 GASTROESOPHAGEAL REFLUX DISEASE WITH ESOPHAGITIS: ICD-10-CM

## 2017-07-24 DIAGNOSIS — I10 ESSENTIAL HYPERTENSION: ICD-10-CM

## 2017-07-24 DIAGNOSIS — Z12.31 ENCOUNTER FOR SCREENING MAMMOGRAM FOR MALIGNANT NEOPLASM OF BREAST: ICD-10-CM

## 2017-07-24 DIAGNOSIS — E11.9 TYPE 2 DIABETES MELLITUS WITHOUT COMPLICATION, UNSPECIFIED LONG TERM INSULIN USE STATUS: Primary | ICD-10-CM

## 2017-07-24 DIAGNOSIS — E78.00 PURE HYPERCHOLESTEROLEMIA: ICD-10-CM

## 2017-07-24 DIAGNOSIS — M05.79 RHEUMATOID ARTHRITIS INVOLVING MULTIPLE SITES WITH POSITIVE RHEUMATOID FACTOR (HCC): ICD-10-CM

## 2017-07-24 LAB — HBA1C MFR BLD HPLC: 6 %

## 2017-07-24 RX ORDER — SIMVASTATIN 40 MG/1
40 TABLET, FILM COATED ORAL
Qty: 90 TAB | Refills: 4 | Status: SHIPPED | OUTPATIENT
Start: 2017-07-24 | End: 2018-01-23 | Stop reason: SDUPTHER

## 2017-07-24 RX ORDER — LOSARTAN POTASSIUM 50 MG/1
50 TABLET ORAL DAILY
Qty: 90 TAB | Refills: 4 | Status: SHIPPED | OUTPATIENT
Start: 2017-07-24 | End: 2018-01-23 | Stop reason: SDUPTHER

## 2017-07-24 RX ORDER — HYDROCHLOROTHIAZIDE 25 MG/1
25 TABLET ORAL DAILY
Qty: 90 TAB | Refills: 4 | Status: SHIPPED | OUTPATIENT
Start: 2017-07-24 | End: 2018-01-23 | Stop reason: SDUPTHER

## 2017-07-24 NOTE — MR AVS SNAPSHOT
Visit Information Date & Time Provider Department Dept. Phone Encounter #  
 7/24/2017 10:30 AM Ryan Bonilla, [address] [facility]  [phone] Follow-up Instructions Return in about 6 months (around 1/24/2018) for physical, labs at next visit, EKG next visit, spirometry next visit, Michael Nano next visit, mammo prior, . Upcoming Health Maintenance Date Due Pneumococcal 65+ Low/Medium Risk (2 of 2 - PPSV23) 10/24/2016 EYE EXAM RETINAL OR DILATED Q1 11/5/2016 HEMOGLOBIN A1C Q6M 6/15/2017 INFLUENZA AGE 9 TO ADULT 8/1/2017 GLAUCOMA SCREENING Q2Y 11/5/2017 MICROALBUMIN Q1 12/15/2017 LIPID PANEL Q1 12/15/2017 FOOT EXAM Q1 1/10/2018 MEDICARE YEARLY EXAM 1/11/2018 BREAST CANCER SCRN MAMMOGRAM 4/19/2019 DTaP/Tdap/Td series (2 - Td) 10/24/2021 Allergies as of 7/24/2017  Review Complete On: 7/24/2017 By: Ryan Bonilla, DO Severity Noted Reaction Type Reactions Percocet [Oxycodone-acetaminophen]  01/24/2012    Nausea and Vomiting Ultram [Tramadol]  04/29/2011    Swelling Current Immunizations  Reviewed on 12/16/2014 Name Date Influenza High Dose Vaccine PF 1/24/2017, 9/30/2015 Influenza Vaccine 12/16/2014, 12/2/2013  1:02 PM  
 Influenza Vaccine Split 11/27/2012, 10/24/2011 Pneumococcal Conjugate (PCV-13) 9/30/2015 Pneumococcal Vaccine (Unspecified Type) 10/24/2011 TDAP Vaccine 10/24/2011 Not reviewed this visit You Were Diagnosed With   
  
 Codes Comments Type 2 diabetes mellitus without complication, unspecified long term insulin use status (HCC)    -  Primary ICD-10-CM: E11.9 ICD-9-CM: 250.00 Gastroesophageal reflux disease with esophagitis     ICD-10-CM: K21.0 ICD-9-CM: 530.11 Essential hypertension     ICD-10-CM: I10 
ICD-9-CM: 401.9  Controlled type 2 diabetes mellitus with diabetic nephropathy, without long-term current use of insulin (HCC)     ICD-10-CM: E11.21 
 ICD-9-CM: 250.40, 583.81 Rheumatoid arthritis involving multiple sites with positive rheumatoid factor (HCC)     ICD-10-CM: M05.79 ICD-9-CM: 714.0 Pure hypercholesterolemia     ICD-10-CM: E78.00 ICD-9-CM: 272.0 Age-related osteoporosis without current pathological fracture     ICD-10-CM: M81.0 ICD-9-CM: 733.01 Encounter for screening mammogram for malignant neoplasm of breast     ICD-10-CM: Z12.31 
ICD-9-CM: V76.12 Vitals BP Pulse Temp Resp Height(growth percentile) Weight(growth percentile) 139/70 (BP 1 Location: Right arm, BP Patient Position: Sitting) 72 97.5 °F (36.4 °C) (Oral) 16 5' 2\" (1.575 m) 171 lb 6.4 oz (77.7 kg) SpO2 BMI OB Status Smoking Status 93% 31.35 kg/m2 Hysterectomy Never Smoker BMI and BSA Data Body Mass Index Body Surface Area  
 31.35 kg/m 2 1.84 m 2 Preferred Pharmacy Pharmacy Name Phone [facility] [address], [address] [place] [address] [phone] Your Updated Medication List  
  
   
This list is accurate as of: 7/24/17 10:54 AM.  Always use your most recent med list.  
  
  
  
  
 budesonide-formoterol 160-4.5 mcg/actuation HFA inhaler Commonly known as:  SYMBICORT Take 2 Puffs by inhalation two (2) times a day. hydroCHLOROthiazide 25 mg tablet Commonly known as:  HYDRODIURIL Take 1 Tab by mouth daily. linagliptin 5 mg tablet Commonly known as:  Uri Raring Take 1 Tab by mouth daily. losartan 50 mg tablet Commonly known as:  COZAAR Take 1 Tab by mouth daily. simvastatin 40 mg tablet Commonly known as:  ZOCOR Take 1 Tab by mouth nightly. SPIRIVA RESPIMAT 2.5 mcg/actuation inhaler Generic drug:  tiotropium bromide Take 2 Puffs by inhalation daily. Prescriptions Sent to Pharmacy Refills  
 hydroCHLOROthiazide (HYDRODIURIL) 25 mg tablet 4 Sig: Take 1 Tab by mouth daily.   
 Class: Normal  
 Pharmacy: [facility] [address], [address] Ph #: [phone] Route: Oral  
 losartan (COZAAR) 50 mg tablet 4 Sig: Take 1 Tab by mouth daily. Class: Normal  
 Pharmacy: [facility] [address], [address] Ph #: [phone] Route: Oral  
 simvastatin (ZOCOR) 40 mg tablet 4 Sig: Take 1 Tab by mouth nightly. Class: Normal  
 Pharmacy: [facility] [address], [address] Ph #: [phone] Route: Oral  
 linagliptin (TRADJENTA) 5 mg tablet 4 Sig: Take 1 Tab by mouth daily. Class: Normal  
 Pharmacy: [facility] [address], [address] Ph #: [phone] Route: Oral  
  
We Performed the Following AMB POC FUNDUS PHOTOGRAPHY WITH INTERP AND REPORT [38963 CPT(R)] AMB POC HEMOGLOBIN A1C [75324 CPT(R)]  DIABETES EYE EXAM [6 Custom] Follow-up Instructions Return in about 6 months (around 1/24/2018) for physical, labs at next visit, EKG next visit, spirometry next visit, Kristan Carrier next visit, mammo prior, . To-Do List   
 Around 01/20/2018 Lab:  CBC WITH AUTOMATED DIFF   
  
 01/20/2018 Imaging:  DEXA BONE DENSITY STUDY AXIAL   
  
 01/20/2018 Lab:  HEMOGLOBIN A1C WITH EAG Around 01/20/2018 Lab:  LIPID PANEL   
  
 01/20/2018 Imaging:  [facility] MAMMO BI SCREENING INCL CAD Around 01/20/2018 Lab:  METABOLIC PANEL, COMPREHENSIVE   
  
 01/20/2018 Lab:  MICROALBUMIN, UR, RAND W/ MICROALBUMIN/CREA RATIO Around 01/20/2018 Lab:  TSH 3RD GENERATION Around 01/20/2018 Lab:  URINALYSIS W/ RFLX MICROSCOPIC Introducing [facility]! Azul Monae introduces [employer] patient portal. Now you can access parts of your medical record, email your doctor's office, and request medication refills online. 1. In your internet browser, go to [email or web address] 2. Click on the First Time User? Click Here link in the Sign In box. You will see the New Member Sign Up page. 3. Enter your [employer] Access Code exactly as it appears below. You will not need to use this code after youve completed the sign-up process. If you do not sign up before the expiration date, you must request a new code. · [employer] Access Code: [email or web address] Expires: 10/22/2017 10:54 AM 
 
4. Enter the last four digits of your Social Security Number (xxxx) and Date of Birth (mm/dd/yyyy) as indicated and click Submit. You will be taken to the next sign-up page. 5. Create a [employer] ID. This will be your [employer] login ID and cannot be changed, so think of one that is secure and easy to remember. 6. Create a [employer] password. You can change your password at any time. 7. Enter your Password Reset Question and Answer. This can be used at a later time if you forget your password. 8. Enter your e-mail address. You will receive e-mail notification when new information is available in [address] 9. Click Sign Up. You can now view and download portions of your medical record. 10. Click the Download Summary menu link to download a portable copy of your medical information. If you have questions, please visit the Frequently Asked Questions section of the [employer] website. Remember, [employer] is NOT to be used for urgent needs. For medical emergencies, dial 911. Now available from your iPhone and Android! Please provide this summary of care documentation to your next provider. Your primary care clinician is listed as [address]. If you have any questions after today's visit, please call [phone].

## 2017-07-24 NOTE — PROGRESS NOTES
Johana Cotton is a 77 y.o.  female and presents with    Chief Complaint   Patient presents with    Diabetes    Hypertension    Cholesterol Problem    Arthritis    COPD           Subjective:    Cardiovascular Review:  The patient has diabetes, hypertension and hyperlipidemia. Diet and Lifestyle: not attempting to follow a low fat, low cholesterol diet, not attempting to follow a low sodium diet  Home BP Monitoring: is not measured at home. Pertinent ROS: taking medications as instructed, no medication side effects noted, no TIA's, no chest pain on exertion, no dyspnea on exertion, no swelling of ankles. Diabetes Mellitus:  She has diabetes mellitus, and  diabetes, hypertension and hyperlipidemia. Diabetic ROS - diabetic diet compliance: compliant most of the time. Lab review: labs are reviewed, up to date and normal.   COPD Review:  The patient is being seen for follow up of COPD. Oxygen: She currently is not on home oxygen therapy. Symptoms: symptoms worse at night. Patient uses 1 pillows at night. Patient does not smoke cigarettes. Osteoarthritis and Chronic Pain:  Patient has osteoarthritis, primarily affecting the diffuse. Symptoms onset: problem is longstanding. Rheumatological ROS: no current joint or muscle symptoms, essentially pain-free. Response to treatment plan: stable. Additional Concerns:          Patient Active Problem List    Diagnosis Date Noted    Advance directive in chart 07/11/2016    Asthma 12/02/2013    Pure hypercholesterolemia 10/24/2011    GERD (gastroesophageal reflux disease) 04/29/2011    HTN (hypertension) 04/29/2011    Type 2 diabetes mellitus without complication ([facility] [address] 75/30/4483    Rheumatoid arthritis involving multiple sites with positive rheumatoid factor ([facility] [address] 04/29/2011     Current Outpatient Prescriptions   Medication Sig Dispense Refill    hydroCHLOROthiazide (HYDRODIURIL) 25 mg tablet Take 1 Tab by mouth daily. 90 Tab 4    losartan (COZAAR) 50 mg tablet Take 1 Tab by mouth daily. 90 Tab 4    simvastatin (ZOCOR) 40 mg tablet Take 1 Tab by mouth nightly. 90 Tab 4    linagliptin (TRADJENTA) 5 mg tablet Take 1 Tab by mouth daily. 90 Tab 4    tiotropium bromide (SPIRIVA RESPIMAT) 2.5 mcg/actuation inhaler Take 2 Puffs by inhalation daily.  budesonide-formoterol (SYMBICORT) 160-4.5 mcg/actuation HFA inhaler Take 2 Puffs by inhalation two (2) times a day. Allergies   Allergen Reactions    Percocet [Oxycodone-Acetaminophen] Nausea and Vomiting    Ultram [Tramadol] Swelling     Past Medical History:   Diagnosis Date    Advance directive in chart 7/11/2016    Asthma 12/2/2013    GERD (gastroesophageal reflux disease) 4/29/2011    HTN (hypertension) 4/29/2011    Rheumatoid arthritis ([facility] [address] 4/29/2011    Type II or unspecified type diabetes mellitus without mention of complication, not stated as uncontrolled 4/29/2011     Past Surgical History:   Procedure Laterality Date    HX BREAST BIOPSY Right 1970's    rt benign    HX HYSTERECTOMY      HX NARCISO AND BSO  2004     Family History   Problem Relation Age of Onset    Hypertension Mother    Dennys Marie Arthritis-rheumatoid Mother     Stroke Father      Social History   Substance Use Topics    Smoking status: Never Smoker    Smokeless tobacco: Never Used    Alcohol use No       ROS       All other systems reviewed and are negative.       Objective:  Vitals:    07/24/17 1044   BP: 139/70   Pulse: 72   Resp: 16   Temp: 97.5 °F (36.4 °C)   TempSrc: Oral   SpO2: 93%   Weight: 171 lb 6.4 oz (77.7 kg)   Height: 5' 2\" (1.575 m)   PainSc:   0 - No pain                 alert, well appearing, and in no distress, oriented to person, place, and time and normal appearing weight  Chest - clear to auscultation, no wheezes, rales or rhonchi, symmetric air entry  Heart - normal rate, regular rhythm, normal S1, S2, no murmurs, rubs, clicks or gallops  Abdomen - soft, nontender, nondistended, no masses or organomegaly        LABS   aic  6. 0TESTS      Assessment/Plan:    Diabetes - well controlled, stable  Hypertension - well controlled, stable  Hyperlipidemia - well controlled, stable  Copd stable  arthritsi stable      Lab review: labs are reviewed, up to date and normal    Diagnoses and all orders for this visit:    1. Type 2 diabetes mellitus without complication, unspecified long term insulin use status (HCC)  -     AMB POC HEMOGLOBIN A1C  -     hydroCHLOROthiazide (HYDRODIURIL) 25 mg tablet; Take 1 Tab by mouth daily. -     losartan (COZAAR) 50 mg tablet; Take 1 Tab by mouth daily. -     simvastatin (ZOCOR) 40 mg tablet; Take 1 Tab by mouth nightly. -     linagliptin (TRADJENTA) 5 mg tablet; Take 1 Tab by mouth daily.  -     AMB POC FUNDUS PHOTOGRAPHY WITH INTERP AND REPORT  -      DIABETES EYE EXAM  -     CBC WITH AUTOMATED DIFF; Future  -     METABOLIC PANEL, COMPREHENSIVE; Future  -     URINALYSIS W/ RFLX MICROSCOPIC; Future  -     LIPID PANEL; Future  -     TSH 3RD GENERATION; Future  -     MICROALBUMIN, UR, RAND W/ MICROALBUMIN/CREA RATIO; Future  -     HEMOGLOBIN A1C WITH EAG; Future  -     DEXA BONE DENSITY STUDY AXIAL; Future  -     [facility] MAMMO BI SCREENING INCL CAD; Future    2. Gastroesophageal reflux disease with esophagitis  -     AMB POC HEMOGLOBIN A1C  -     hydroCHLOROthiazide (HYDRODIURIL) 25 mg tablet; Take 1 Tab by mouth daily. -     losartan (COZAAR) 50 mg tablet; Take 1 Tab by mouth daily. -     simvastatin (ZOCOR) 40 mg tablet; Take 1 Tab by mouth nightly. -     linagliptin (TRADJENTA) 5 mg tablet; Take 1 Tab by mouth daily.  -     AMB POC FUNDUS PHOTOGRAPHY WITH INTERP AND REPORT  -      DIABETES EYE EXAM  -     CBC WITH AUTOMATED DIFF; Future  -     METABOLIC PANEL, COMPREHENSIVE; Future  -     URINALYSIS W/ RFLX MICROSCOPIC; Future  -     LIPID PANEL; Future  -     TSH 3RD GENERATION;  Future  -     MICROALBUMIN, UR, RAND W/ MICROALBUMIN/CREA RATIO; Future  -     HEMOGLOBIN A1C WITH EAG; Future  -     DEXA BONE DENSITY STUDY AXIAL; Future  -     [facility] MAMMO BI SCREENING INCL CAD; Future    3. Essential hypertension  -     AMB POC HEMOGLOBIN A1C  -     hydroCHLOROthiazide (HYDRODIURIL) 25 mg tablet; Take 1 Tab by mouth daily. -     losartan (COZAAR) 50 mg tablet; Take 1 Tab by mouth daily. -     simvastatin (ZOCOR) 40 mg tablet; Take 1 Tab by mouth nightly. -     linagliptin (TRADJENTA) 5 mg tablet; Take 1 Tab by mouth daily.  -     AMB POC FUNDUS PHOTOGRAPHY WITH INTERP AND REPORT  -      DIABETES EYE EXAM  -     CBC WITH AUTOMATED DIFF; Future  -     METABOLIC PANEL, COMPREHENSIVE; Future  -     URINALYSIS W/ RFLX MICROSCOPIC; Future  -     LIPID PANEL; Future  -     TSH 3RD GENERATION; Future  -     MICROALBUMIN, UR, RAND W/ MICROALBUMIN/CREA RATIO; Future  -     HEMOGLOBIN A1C WITH EAG; Future  -     DEXA BONE DENSITY STUDY AXIAL; Future  -     [facility] MAMMO BI SCREENING INCL CAD; Future    4. Controlled type 2 diabetes mellitus with diabetic nephropathy, without long-term current use of insulin ([facility])  -     AMB POC HEMOGLOBIN A1C  -     hydroCHLOROthiazide (HYDRODIURIL) 25 mg tablet; Take 1 Tab by mouth daily. -     losartan (COZAAR) 50 mg tablet; Take 1 Tab by mouth daily. -     simvastatin (ZOCOR) 40 mg tablet; Take 1 Tab by mouth nightly. -     linagliptin (TRADJENTA) 5 mg tablet; Take 1 Tab by mouth daily.  -     AMB POC FUNDUS PHOTOGRAPHY WITH INTERP AND REPORT  -      DIABETES EYE EXAM  -     CBC WITH AUTOMATED DIFF; Future  -     METABOLIC PANEL, COMPREHENSIVE; Future  -     URINALYSIS W/ RFLX MICROSCOPIC; Future  -     LIPID PANEL; Future  -     TSH 3RD GENERATION; Future  -     MICROALBUMIN, UR, RAND W/ MICROALBUMIN/CREA RATIO; Future  -     HEMOGLOBIN A1C WITH EAG; Future  -     DEXA BONE DENSITY STUDY AXIAL; Future  -     [facility] MAMMO BI SCREENING INCL CAD; Future    5.  Rheumatoid arthritis involving multiple sites with positive rheumatoid factor (HCC)  -     AMB POC HEMOGLOBIN A1C  -     hydroCHLOROthiazide (HYDRODIURIL) 25 mg tablet; Take 1 Tab by mouth daily. -     losartan (COZAAR) 50 mg tablet; Take 1 Tab by mouth daily. -     simvastatin (ZOCOR) 40 mg tablet; Take 1 Tab by mouth nightly. -     linagliptin (TRADJENTA) 5 mg tablet; Take 1 Tab by mouth daily.  -     AMB POC FUNDUS PHOTOGRAPHY WITH INTERP AND REPORT  -      DIABETES EYE EXAM  -     CBC WITH AUTOMATED DIFF; Future  -     METABOLIC PANEL, COMPREHENSIVE; Future  -     URINALYSIS W/ RFLX MICROSCOPIC; Future  -     LIPID PANEL; Future  -     TSH 3RD GENERATION; Future  -     MICROALBUMIN, UR, RAND W/ MICROALBUMIN/CREA RATIO; Future  -     HEMOGLOBIN A1C WITH EAG; Future  -     DEXA BONE DENSITY STUDY AXIAL; Future  -     [facility] MAMMO BI SCREENING INCL CAD; Future    6. Pure hypercholesterolemia  -     AMB POC HEMOGLOBIN A1C  -     hydroCHLOROthiazide (HYDRODIURIL) 25 mg tablet; Take 1 Tab by mouth daily. -     losartan (COZAAR) 50 mg tablet; Take 1 Tab by mouth daily. -     simvastatin (ZOCOR) 40 mg tablet; Take 1 Tab by mouth nightly. -     linagliptin (TRADJENTA) 5 mg tablet; Take 1 Tab by mouth daily.  -     AMB POC FUNDUS PHOTOGRAPHY WITH INTERP AND REPORT  -      DIABETES EYE EXAM  -     CBC WITH AUTOMATED DIFF; Future  -     METABOLIC PANEL, COMPREHENSIVE; Future  -     URINALYSIS W/ RFLX MICROSCOPIC; Future  -     LIPID PANEL; Future  -     TSH 3RD GENERATION; Future  -     MICROALBUMIN, UR, RAND W/ MICROALBUMIN/CREA RATIO; Future  -     HEMOGLOBIN A1C WITH EAG; Future  -     DEXA BONE DENSITY STUDY AXIAL; Future  -     [facility] MAMMO BI SCREENING INCL CAD; Future    7. Age-related osteoporosis without current pathological fracture   -     DEXA BONE DENSITY STUDY AXIAL; Future    8. Encounter for screening mammogram for malignant neoplasm of breast   -     [facility] MAMMO BI SCREENING INCL CAD;  Future          I have discussed the diagnosis with the patient and the intended plan as seen in the above orders. The patient has received an after-visit summary and questions were answered concerning future plans. I have discussed medication side effects and warnings with the patient as well. I have reviewed the plan of care with the patient, accepted their input and they are in agreement with the treatment goals. Follow-up Disposition:  Return in about 6 months (around 1/24/2018) for physical, labs at next visit, EKG next visit, spirometry next visit, Duane Edgar next visit, mammo prior, .

## 2017-07-24 NOTE — TELEPHONE ENCOUNTER
Pulmonary Rehab called patient and she is interested in the program. Will follow up to schedule.     Thank you,  Lora Amaro

## 2017-07-24 NOTE — PROGRESS NOTES
Blanca Mora is a 77 y.o. female presented to clinic f/u A1C. Pt denies any pain or concerns at this time. 1. Have you been to the ER, urgent care clinic since your last visit? Hospitalized since your last visit? No    2. Have you seen or consulted any other health care providers outside of the [address] [place] [address] since your last visit? Include any pap smears or colon screening.  No     Learning Assessment 12/2/2013   PRIMARY LEARNER Patient   PRIMARY LANGUAGE ENGLISH   LEARNER PREFERENCE PRIMARY LISTENING   ANSWERED BY patient   RELATIONSHIP SELF

## 2017-09-05 ENCOUNTER — TELEPHONE (OUTPATIENT)
Dept: PULMONOLOGY | Age: 67
End: 2017-09-05

## 2017-09-05 NOTE — TELEPHONE ENCOUNTER
Pulmonary Rehab called patient and left a message to schedule. Will follow up.     Thank you,  Blake Guerrero

## 2017-09-05 NOTE — TELEPHONE ENCOUNTER
Pulmonary Rehab spoke to patient and she is no longer interested in the program because she is exercising on her own. Patient declined services.      Thank you,  Emilie Dire

## 2017-12-20 ENCOUNTER — TELEPHONE (OUTPATIENT)
Dept: FAMILY MEDICINE CLINIC | Age: 67
End: 2017-12-20

## 2017-12-20 NOTE — TELEPHONE ENCOUNTER
Please update the order for patients labs as its past six months. She will be doing them on January 16, 2018 a week prior to her cpe.

## 2018-01-16 ENCOUNTER — HOSPITAL ENCOUNTER (OUTPATIENT)
Dept: LAB | Age: 68
Discharge: HOME | End: 2018-01-16
Payer: MEDICARE

## 2018-01-16 LAB
ALBUMIN SERPL-MCNC: 4.1 G/DL (ref 3.4–5)
ALBUMIN/GLOB SERPL: 1.2 {RATIO} (ref 0.8–1.7)
ALP SERPL-CCNC: 89 U/L (ref 45–117)
ALT SERPL-CCNC: 21 U/L (ref 13–56)
ANION GAP SERPL CALC-SCNC: 5 MMOL/L (ref 3–18)
APPEARANCE UR: CLEAR
AST SERPL-CCNC: 16 U/L (ref 15–37)
BACTERIA URNS QL MICRO: NEGATIVE /HPF
BASOPHILS # BLD: 0 K/UL (ref 0–0.06)
BASOPHILS NFR BLD: 0 % (ref 0–2)
BILIRUB SERPL-MCNC: 0.6 MG/DL (ref 0.2–1)
BILIRUB UR QL: NEGATIVE
BUN SERPL-MCNC: 16 MG/DL (ref 7–18)
BUN/CREAT SERPL: 14 (ref 12–20)
CALCIUM SERPL-MCNC: 8.7 MG/DL (ref 8.5–10.1)
CHLORIDE SERPL-SCNC: 102 MMOL/L (ref 100–108)
CHOLEST SERPL-MCNC: 122 MG/DL
CO2 SERPL-SCNC: 32 MMOL/L (ref 21–32)
COLOR UR: YELLOW
CREAT SERPL-MCNC: 1.11 MG/DL (ref 0.6–1.3)
CREAT UR-MCNC: 257.07 MG/DL (ref 30–125)
DIFFERENTIAL METHOD BLD: ABNORMAL
EOSINOPHIL # BLD: 0.1 K/UL (ref 0–0.4)
EOSINOPHIL NFR BLD: 3 % (ref 0–5)
EPITH CASTS URNS QL MICRO: NORMAL /LPF (ref 0–5)
ERYTHROCYTE [DISTWIDTH] IN BLOOD BY AUTOMATED COUNT: 13.4 % (ref 11.6–14.5)
EST. AVERAGE GLUCOSE BLD GHB EST-MCNC: 126 MG/DL
GLOBULIN SER CALC-MCNC: 3.5 G/DL (ref 2–4)
GLUCOSE SERPL-MCNC: 100 MG/DL (ref 74–99)
GLUCOSE UR STRIP.AUTO-MCNC: NEGATIVE MG/DL
HBA1C MFR BLD: 6 % (ref 4.2–5.6)
HCT VFR BLD AUTO: 37.6 % (ref 35–45)
HDLC SERPL-MCNC: 50 MG/DL (ref 40–60)
HDLC SERPL: 2.4 {RATIO} (ref 0–5)
HGB BLD-MCNC: 12.5 G/DL (ref 12–16)
HGB UR QL STRIP: NEGATIVE
KETONES UR QL STRIP.AUTO: NEGATIVE MG/DL
LDLC SERPL CALC-MCNC: 54.6 MG/DL (ref 0–100)
LEUKOCYTE ESTERASE UR QL STRIP.AUTO: ABNORMAL
LIPID PROFILE,FLP: NORMAL
LYMPHOCYTES # BLD: 1.5 K/UL (ref 0.9–3.6)
LYMPHOCYTES NFR BLD: 26 % (ref 21–52)
MCH RBC QN AUTO: 30.6 PG (ref 24–34)
MCHC RBC AUTO-ENTMCNC: 33.2 G/DL (ref 31–37)
MCV RBC AUTO: 91.9 FL (ref 74–97)
MICROALBUMIN UR-MCNC: 2.3 MG/DL (ref 0–3)
MICROALBUMIN/CREAT UR-RTO: 9 MG/G (ref 0–30)
MONOCYTES # BLD: 0.4 K/UL (ref 0.05–1.2)
MONOCYTES NFR BLD: 7 % (ref 3–10)
NEUTS SEG # BLD: 3.7 K/UL (ref 1.8–8)
NEUTS SEG NFR BLD: 64 % (ref 40–73)
NITRITE UR QL STRIP.AUTO: NEGATIVE
PH UR STRIP: 6 [PH] (ref 5–8)
PLATELET # BLD AUTO: 221 K/UL (ref 135–420)
PMV BLD AUTO: 9.6 FL (ref 9.2–11.8)
POTASSIUM SERPL-SCNC: 3.6 MMOL/L (ref 3.5–5.5)
PROT SERPL-MCNC: 7.6 G/DL (ref 6.4–8.2)
PROT UR STRIP-MCNC: NEGATIVE MG/DL
RBC # BLD AUTO: 4.09 M/UL (ref 4.2–5.3)
RBC #/AREA URNS HPF: 0 /HPF (ref 0–5)
SODIUM SERPL-SCNC: 139 MMOL/L (ref 136–145)
SP GR UR REFRACTOMETRY: 1.02 (ref 1–1.03)
TRIGL SERPL-MCNC: 87 MG/DL (ref ?–150)
TSH SERPL DL<=0.05 MIU/L-ACNC: 0.61 UIU/ML (ref 0.36–3.74)
UROBILINOGEN UR QL STRIP.AUTO: 0.2 EU/DL (ref 0.2–1)
VLDLC SERPL CALC-MCNC: 17.4 MG/DL
WBC # BLD AUTO: 5.7 K/UL (ref 4.6–13.2)
WBC URNS QL MICRO: NORMAL /HPF (ref 0–4)

## 2018-01-16 PROCEDURE — 81001 URINALYSIS AUTO W/SCOPE: CPT | Performed by: FAMILY MEDICINE

## 2018-01-16 PROCEDURE — 82043 UR ALBUMIN QUANTITATIVE: CPT | Performed by: FAMILY MEDICINE

## 2018-01-16 PROCEDURE — 85025 COMPLETE CBC W/AUTO DIFF WBC: CPT | Performed by: FAMILY MEDICINE

## 2018-01-16 PROCEDURE — 80053 COMPREHEN METABOLIC PANEL: CPT | Performed by: FAMILY MEDICINE

## 2018-01-16 PROCEDURE — 83036 HEMOGLOBIN GLYCOSYLATED A1C: CPT | Performed by: FAMILY MEDICINE

## 2018-01-16 PROCEDURE — 84443 ASSAY THYROID STIM HORMONE: CPT | Performed by: FAMILY MEDICINE

## 2018-01-16 PROCEDURE — 80061 LIPID PANEL: CPT | Performed by: FAMILY MEDICINE

## 2018-01-16 PROCEDURE — 36415 COLL VENOUS BLD VENIPUNCTURE: CPT | Performed by: FAMILY MEDICINE

## 2018-01-23 ENCOUNTER — OFFICE VISIT (OUTPATIENT)
Dept: FAMILY MEDICINE CLINIC | Age: 68
End: 2018-01-23

## 2018-01-23 VITALS
HEART RATE: 74 BPM | BODY MASS INDEX: 31.47 KG/M2 | SYSTOLIC BLOOD PRESSURE: 129 MMHG | TEMPERATURE: 98.2 F | HEIGHT: 62 IN | DIASTOLIC BLOOD PRESSURE: 65 MMHG | RESPIRATION RATE: 16 BRPM | OXYGEN SATURATION: 97 % | WEIGHT: 171 LBS

## 2018-01-23 DIAGNOSIS — K21.00 GASTROESOPHAGEAL REFLUX DISEASE WITH ESOPHAGITIS: ICD-10-CM

## 2018-01-23 DIAGNOSIS — J45.909 MILD ASTHMA WITHOUT COMPLICATION, UNSPECIFIED WHETHER PERSISTENT: ICD-10-CM

## 2018-01-23 DIAGNOSIS — E11.21 TYPE 2 DIABETES MELLITUS WITH NEPHROPATHY (HCC): ICD-10-CM

## 2018-01-23 DIAGNOSIS — Z00.00 ROUTINE GENERAL MEDICAL EXAMINATION AT A HEALTH CARE FACILITY: Primary | ICD-10-CM

## 2018-01-23 DIAGNOSIS — E11.21 CONTROLLED TYPE 2 DIABETES MELLITUS WITH DIABETIC NEPHROPATHY, WITHOUT LONG-TERM CURRENT USE OF INSULIN (HCC): ICD-10-CM

## 2018-01-23 DIAGNOSIS — Z23 ENCOUNTER FOR IMMUNIZATION: ICD-10-CM

## 2018-01-23 DIAGNOSIS — M05.79 RHEUMATOID ARTHRITIS INVOLVING MULTIPLE SITES WITH POSITIVE RHEUMATOID FACTOR (HCC): ICD-10-CM

## 2018-01-23 DIAGNOSIS — E78.00 PURE HYPERCHOLESTEROLEMIA: ICD-10-CM

## 2018-01-23 DIAGNOSIS — I10 ESSENTIAL HYPERTENSION: ICD-10-CM

## 2018-01-23 RX ORDER — HYDROCHLOROTHIAZIDE 25 MG/1
25 TABLET ORAL DAILY
Qty: 90 TAB | Refills: 4 | Status: SHIPPED | OUTPATIENT
Start: 2018-01-23 | End: 2019-02-11 | Stop reason: SDUPTHER

## 2018-01-23 RX ORDER — LOSARTAN POTASSIUM 50 MG/1
50 TABLET ORAL DAILY
Qty: 90 TAB | Refills: 4 | Status: SHIPPED | OUTPATIENT
Start: 2018-01-23 | End: 2019-02-11 | Stop reason: SDUPTHER

## 2018-01-23 RX ORDER — BUDESONIDE AND FORMOTEROL FUMARATE DIHYDRATE 160; 4.5 UG/1; UG/1
2 AEROSOL RESPIRATORY (INHALATION) 2 TIMES DAILY
Qty: 3 INHALER | Refills: 12 | Status: SHIPPED | OUTPATIENT
Start: 2018-01-23 | End: 2019-02-11 | Stop reason: SDUPTHER

## 2018-01-23 RX ORDER — SIMVASTATIN 40 MG/1
40 TABLET, FILM COATED ORAL
Qty: 90 TAB | Refills: 4 | Status: SHIPPED | OUTPATIENT
Start: 2018-01-23 | End: 2019-02-11 | Stop reason: SDUPTHER

## 2018-01-23 NOTE — PROGRESS NOTES
Moses Fisher is a 79 y.o. female presented to clinic for an annual physical. Pt denies any pain at this time but has concerns about random chest pain that comes and goes when sitting or walking short distances. 1. Have you been to the ER, urgent care clinic since your last visit? Hospitalized since your last visit? No    2. Have you seen or consulted any other health care providers outside of the [address] [place] [address] since your last visit? Include any pap smears or colon screening. No     Learning Assessment 12/2/2013   PRIMARY LEARNER Patient   PRIMARY LANGUAGE ENGLISH   LEARNER PREFERENCE PRIMARY LISTENING   ANSWERED BY patient   RELATIONSHIP SELF      Patient verbally agrees to permit the Students working in [address] [place] [address] office to observe and participate in medical care during the appointment today, including, where appropriate, providing direct medical care to patient under the physicians direct supervision. Patient agrees that he/she have been given the opportunity to refuse to give such consent and may withdraw consent at any time during appointment.      Health Maintenance Due   Topic Date Due    EYE EXAM RETINAL OR DILATED Q1  11/05/2016    Influenza Age 5 to Adult  08/01/2017    FOOT EXAM Q1  01/10/2018    MEDICARE YEARLY EXAM  01/11/2018

## 2018-01-23 NOTE — PROGRESS NOTES
THE SUBSEQUENT MEDICARE ANNUAL WELLNESS VISIT PROGRESS NOTES    This is a Subsequent Medicare Annual Wellness Visit providing Personalized Prevention Plan Services (PPPS) (Performed 12 months after initial AWV and PPPS )    I have reviewed the patient's medical history in detail and updated the computerized patient record. Mohit Khan is a 79 y.o.  female and presents for an subsequent annual wellness exam     Patient Active Problem List    Diagnosis Date Noted    Type 2 diabetes mellitus with nephropathy ([facility] [address] 01/23/2018    Advance directive in chart 07/11/2016    Asthma 12/02/2013    Pure hypercholesterolemia 10/24/2011    GERD (gastroesophageal reflux disease) 04/29/2011    HTN (hypertension) 04/29/2011    Rheumatoid arthritis involving multiple sites with positive rheumatoid factor ([facility] [address] 04/29/2011     Current Outpatient Prescriptions   Medication Sig Dispense Refill    hydroCHLOROthiazide (HYDRODIURIL) 25 mg tablet Take 1 Tab by mouth daily. 90 Tab 4    losartan (COZAAR) 50 mg tablet Take 1 Tab by mouth daily. 90 Tab 4    simvastatin (ZOCOR) 40 mg tablet Take 1 Tab by mouth nightly. 90 Tab 4    linagliptin (TRADJENTA) 5 mg tablet Take 1 Tab by mouth daily. 90 Tab 4    tiotropium bromide (SPIRIVA RESPIMAT) 2.5 mcg/actuation inhaler Take 2 Puffs by inhalation daily. 3 Inhaler 12    budesonide-formoterol (SYMBICORT) 160-4.5 mcg/actuation HFAA Take 2 Puffs by inhalation two (2) times a day.  3 Inhaler 12     Allergies   Allergen Reactions    Percocet [Oxycodone-Acetaminophen] Nausea and Vomiting    Ultram [Tramadol] Swelling     Past Medical History:   Diagnosis Date    Advance directive in chart 7/11/2016    Asthma 12/2/2013    GERD (gastroesophageal reflux disease) 4/29/2011    HTN (hypertension) 4/29/2011    Rheumatoid arthritis(714.0) 4/29/2011    Type II or unspecified type diabetes mellitus without mention of complication, not stated as uncontrolled 4/29/2011 Past Surgical History:   Procedure Laterality Date    HX BREAST BIOPSY Right 1970's    rt benign    HX HYSTERECTOMY      HX NARCISO AND BSO  2004     Family History   Problem Relation Age of Onset    Hypertension Mother    [place] Arthritis-rheumatoid Mother     Stroke Father      Social History   Substance Use Topics    Smoking status: Never Smoker    Smokeless tobacco: Never Used    Alcohol use No         ROS       All other systems reviewed and are negative. History     Past Medical History:   Diagnosis Date    Advance directive in chart 7/11/2016    Asthma 12/2/2013    GERD (gastroesophageal reflux disease) 4/29/2011    HTN (hypertension) 4/29/2011    Rheumatoid arthritis(714.0) 4/29/2011    Type II or unspecified type diabetes mellitus without mention of complication, not stated as uncontrolled 4/29/2011      Past Surgical History:   Procedure Laterality Date    HX BREAST BIOPSY Right 1970's    rt benign    HX HYSTERECTOMY      HX NARCISO AND BSO  2004     Current Outpatient Prescriptions   Medication Sig Dispense Refill    hydroCHLOROthiazide (HYDRODIURIL) 25 mg tablet Take 1 Tab by mouth daily. 90 Tab 4    losartan (COZAAR) 50 mg tablet Take 1 Tab by mouth daily. 90 Tab 4    simvastatin (ZOCOR) 40 mg tablet Take 1 Tab by mouth nightly. 90 Tab 4    linagliptin (TRADJENTA) 5 mg tablet Take 1 Tab by mouth daily. 90 Tab 4    tiotropium bromide (SPIRIVA RESPIMAT) 2.5 mcg/actuation inhaler Take 2 Puffs by inhalation daily. 3 Inhaler 12    budesonide-formoterol (SYMBICORT) 160-4.5 mcg/actuation HFAA Take 2 Puffs by inhalation two (2) times a day.  3 Inhaler 12     Allergies   Allergen Reactions    Percocet [Oxycodone-Acetaminophen] Nausea and Vomiting    Ultram [Tramadol] Swelling     Family History   Problem Relation Age of Onset    Hypertension Mother    [place] Arthritis-rheumatoid Mother     Stroke Father      Social History   Substance Use Topics    Smoking status: Never Smoker    Smokeless tobacco: Never Used    Alcohol use No     Patient Active Problem List   Diagnosis Code    GERD (gastroesophageal reflux disease) K21.9    HTN (hypertension) I10    Rheumatoid arthritis involving multiple sites with positive rheumatoid factor (HCC) M05.79    Pure hypercholesterolemia E78.00    Asthma J45.909    Advance directive in chart Z78.9    Type 2 diabetes mellitus with nephropathy ([facility] [address] E11.21       Health Maintenance History  Immunizations reviewed, dtap utd  , pneumovax utd , flu today , zoster utd   Colonoscopy: utd ,   Chest CT : na ,  Eye exam: utd   Mammo utd   Dexascan utd     Health Care Directive or Living Will: yes    Depression Risk Factor Screening:      Patient Health Questionnaire (PHQ-2)   Over the last 2 weeks, how often have you been bothered by any of the following problems? · Little interest or pleasure in doing things? · Not at all. [0]  · Feeling down, depressed, or hopeless? · Not at all. [0]    Total Score: 0/6  PHQ-2 Assessment Scoring:   A score of 2 or more requires further screening with the PHQ-9    Alcohol Risk Factor Screening:     Women: On any occasion during the past 3 months, have you had more than 3 drinks containing alcohol? Do you average more than 7 drinks per week? Men: On any occasion during the past 3 months, have you had more than 4 drinks containing alcohol? Do you average more than 14 drinks per week? Functional Ability and Level of Safety:     Hearing Loss    Hearing is good. Activities of Daily Living   Self-care. Requires assistance with: no ADLs    Fall Risk   No fall risk factors    Abuse Screen   None      Examination   Physical Examination  Vitals:    01/23/18 1017   BP: 129/65   Pulse: 74   Resp: 16   Temp: 98.2 °F (36.8 °C)   TempSrc: Oral   SpO2: 97%   Weight: 171 lb (77.6 kg)   Height: 5' 2\" (1.575 m)   PainSc:   0 - No pain     Body mass index is 31.28 kg/(m^2).     Evaluation of Cognitive Function:  Mood/affect:appropriate Appearance: well groomed   Family member/caregiver input: na     alert, well appearing, and in no distress and oriented to person, place, and time    Patient Care Team:  Yocasta Nash., DO as PCP - General (Family Practice)  Chantale Bailon MD (Rheumatology)  Jenny Garcia MD (Internal Medicine)  Priya Lawrence MD (Gastroenterology)    Advice/Referrals/Counseling/Plan:   Education and counseling provided:  Are appropriate based on today's review and evaluation  Include in education list (weight loss, physical activity, smoking cessation, fall prevention, and nutrition)  current treatment plan is effective, no change in therapy. I have discussed the diagnosis with the patient and the intended plan as seen in the above orders. The patient has received an after-visit summary and questions were answered concerning future plans. I have discussed medication side effects and warnings with the patient as well. I have reviewed the plan of care with the patient, accepted their input and they are in agreement with the treatment goals. Follow-up Disposition:  Return in about 2 weeks (around 2/6/2018) for retinal scan  also schedule 3 mo EOV with aic next visit.    _____________________________________________________________    Problem Assessment    for treatment of   Chief Complaint   Patient presents with    Diabetes    Arthritis    GERD    Hypertension    Cholesterol Problem    Asthma         SUBJECTIVE      Cardiovascular Review:  The patient has diabetes, hypertension and hyperlipidemia. Diet and Lifestyle: not attempting to follow a low fat, low cholesterol diet, not attempting to follow a low sodium diet  Home BP Monitoring: is not measured at home. Pertinent ROS: taking medications as instructed, no medication side effects noted, no TIA's, no chest pain on exertion, no dyspnea on exertion, no swelling of ankles.    Diabetes Mellitus:  She has diabetes mellitus, and  diabetes, hypertension and hyperlipidemia. Diabetic ROS - diabetic diet compliance: compliant all of the time. Lab review: labs are reviewed, up to date and normal.   Asthma Review:  The patient is being seen for follow up of asthma, not currently in exacerbation. Asthma symptoms occur: infrequently. Wheezing when present is described as mild and easily relieved with rescue bronchodilator. Current limitations in activity from asthma: none. Number of days of school or work missed in the last month: 0. Frequency of use of quick-relief meds: rare. Regimen compliance: . Patient does not smoke cigarettes. Osteoarthritis and Chronic Pain:  Patient has rheumatoid arthritis, primarily affecting the diffuse. Symptoms onset: problem is longstanding. Rheumatological ROS: no current joint or muscle symptoms, essentially pain-free. Response to treatment plan: stable. gerd stable off meds     Additional Concerns:        Visit Vitals    /65 (BP 1 Location: Right arm, BP Patient Position: Sitting)    Pulse 74    Temp 98.2 °F (36.8 °C) (Oral)    Resp 16    Ht 5' 2\" (1.575 m)    Wt 171 lb (77.6 kg)    SpO2 97%    BMI 31.28 kg/m2     General:  Alert, cooperative, no distress, appears stated age. Head:  Normocephalic, without obvious abnormality, atraumatic. Eyes:  Conjunctivae/corneas clear. PERRL, EOMs intact. Fundi benign. Ears:  Normal TMs and external ear canals both ears. Nose: Nares normal. Septum midline. Mucosa normal. No drainage or sinus tenderness. Throat: Lips, mucosa, and tongue normal. Teeth and gums normal.   Neck: Supple, symmetrical, trachea midline, no adenopathy, thyroid: no enlargement/tenderness/nodules, no carotid bruit and no JVD. Back:   Symmetric, no curvature. ROM normal. No CVA tenderness. Lungs:   Clear to auscultation bilaterally. Chest wall:  No tenderness or deformity. Heart:  Regular rate and rhythm, S1, S2 normal, no murmur, click, rub or gallop.    Breast Exam:  No tenderness, masses, or nipple abnormality. Abdomen:   Soft, non-tender. Bowel sounds normal. No masses,  No organomegaly. Genitalia:  Normal female without lesion, discharge or tenderness. Rectal:  Normal tone,  no masses or tenderness  Guaiac negative stool. Extremities: Extremities normal, atraumatic, no cyanosis or edema. Pulses: 2+ and symmetric all extremities. Skin: Skin color, texture, turgor normal. No rashes or lesions. Lymph nodes: Cervical, supraclavicular, and axillary nodes normal.   Neurologic: CNII-XII intact. Normal strength, sensation and reflexes throughout. Diabetic foot exam:     Left: Reflexes 2+     Vibratory sensation normal    Proprioception normal   Sharp/dull discrimination normal    Filament test normal sensation with micro filament   Pulse DP: 2+ (normal)   Pulse PT: 2+ (normal)   Deformities: None  Right: Reflexes 2+   Vibratory sensation normal   Proprioception normal   Sharp/dull discrimination normal   Filament test normal sensation with micro filament   Pulse DP: 2+ (normal)   Pulse PT: 2+ (normal)   Deformities: None      LABS   Component      Latest Ref Rng & Units 1/16/2018 1/16/2018 1/16/2018 1/16/2018           9:56 AM  9:56 AM  9:55 AM  9:55 AM   WBC      4.6 - 13.2 K/uL   5.7    RBC      4.20 - 5.30 M/uL   4.09 (L)    HGB      12.0 - 16.0 g/dL   12.5    HCT      35.0 - 45.0 %   37.6    MCV      74.0 - 97.0 FL   91.9    MCH      24.0 - 34.0 PG   30.6    MCHC      31.0 - 37.0 g/dL   33.2    RDW      11.6 - 14.5 %   13.4    PLATELET      607 - 022 K/uL   221    MPV      9.2 - 11.8 FL   9.6    NEUTROPHILS      40 - 73 %   64    LYMPHOCYTES      21 - 52 %   26    MONOCYTES      3 - 10 %   7    EOSINOPHILS      0 - 5 %   3    BASOPHILS      0 - 2 %   0    ABS. NEUTROPHILS      1.8 - 8.0 K/UL   3.7    ABS. LYMPHOCYTES      0.9 - 3.6 K/UL   1.5    ABS. MONOCYTES      0.05 - 1.2 K/UL   0.4    ABS. EOSINOPHILS      0.0 - 0.4 K/UL   0.1    ABS.  BASOPHILS      0.0 - 0.06 K/UL   0.0    DF         AUTOMATED    Sodium      136 - 145 mmol/L       Potassium      3.5 - 5.5 mmol/L       Chloride      100 - 108 mmol/L       CO2      21 - 32 mmol/L       Anion gap      3.0 - 18 mmol/L       Glucose      74 - 99 mg/dL       BUN      7.0 - 18 MG/DL       Creatinine      0.6 - 1.3 MG/DL       BUN/Creatinine ratio      12 - 20         GFR est AA      >60 ml/min/1.73m2       GFR est non-AA      >60 ml/min/1.73m2       Calcium      8.5 - 10.1 MG/DL       Bilirubin, total      0.2 - 1.0 MG/DL       ALT (SGPT)      13 - 56 U/L       AST      15 - 37 U/L       Alk. phosphatase      45 - 117 U/L       Protein, total      6.4 - 8.2 g/dL       Albumin      3.4 - 5.0 g/dL       Globulin      2.0 - 4.0 g/dL       A-G Ratio      0.8 - 1.7         Color        YELLOW     Appearance        CLEAR     Specific gravity      1.005 - 1.030    1.024     pH (UA)      5.0 - 8.0    6.0     Protein      NEG mg/dL  NEGATIVE     Glucose      NEG mg/dL  NEGATIVE     Ketone      NEG mg/dL  NEGATIVE     Bilirubin      NEG    NEGATIVE     Blood      NEG    NEGATIVE     Urobilinogen      0.2 - 1.0 EU/dL  0.2     Nitrites      NEG    NEGATIVE     Leukocyte Esterase      NEG    SMALL (A)     Cholesterol, total      <200 MG/DL       Triglyceride      <150 MG/DL       HDL Cholesterol      40 - 60 MG/DL       LDL, calculated      0 - 100 MG/DL       VLDL, calculated      MG/DL       CHOL/HDL Ratio      0 - 5.0         Microalbumin,urine random      0 - 3.0 MG/DL 2.30      Creatinine, urine      30 - 125 mg/dL 257.07 (H)      Microalbumin/Creat.  Ratio      0 - 30 mg/g 9      Hemoglobin A1c, (calculated)      4.2 - 5.6 %       Est. average glucose      mg/dL       TSH      0.36 - 3.74 uIU/mL    0.61     Component      Latest Ref Rng & Units 1/16/2018 1/16/2018 1/16/2018           9:55 AM  9:55 AM  9:55 AM   WBC      4.6 - 13.2 K/uL      RBC      4.20 - 5.30 M/uL      HGB      12.0 - 16.0 g/dL      HCT      35.0 - 45.0 % MCV      74.0 - 97.0 FL      MCH      24.0 - 34.0 PG      MCHC      31.0 - 37.0 g/dL      RDW      11.6 - 14.5 %      PLATELET      569 - 681 K/uL      MPV      9.2 - 11.8 FL      NEUTROPHILS      40 - 73 %      LYMPHOCYTES      21 - 52 %      MONOCYTES      3 - 10 %      EOSINOPHILS      0 - 5 %      BASOPHILS      0 - 2 %      ABS. NEUTROPHILS      1.8 - 8.0 K/UL      ABS. LYMPHOCYTES      0.9 - 3.6 K/UL      ABS. MONOCYTES      0.05 - 1.2 K/UL      ABS. EOSINOPHILS      0.0 - 0.4 K/UL      ABS. BASOPHILS      0.0 - 0.06 K/UL      DF            Sodium      136 - 145 mmol/L 139     Potassium      3.5 - 5.5 mmol/L 3.6     Chloride      100 - 108 mmol/L 102     CO2      21 - 32 mmol/L 32     Anion gap      3.0 - 18 mmol/L 5     Glucose      74 - 99 mg/dL 100 (H)     BUN      7.0 - 18 MG/DL 16     Creatinine      0.6 - 1.3 MG/DL 1.11     BUN/Creatinine ratio      12 - 20   14     GFR est AA      >60 ml/min/1.73m2 59 (L)     GFR est non-AA      >60 ml/min/1.73m2 49 (L)     Calcium      8.5 - 10.1 MG/DL 8.7     Bilirubin, total      0.2 - 1.0 MG/DL 0.6     ALT (SGPT)      13 - 56 U/L 21     AST      15 - 37 U/L 16     Alk.  phosphatase      45 - 117 U/L 89     Protein, total      6.4 - 8.2 g/dL 7.6     Albumin      3.4 - 5.0 g/dL 4.1     Globulin      2.0 - 4.0 g/dL 3.5     A-G Ratio      0.8 - 1.7   1.2     Color            Appearance            Specific gravity      1.005 - 1.030        pH (UA)      5.0 - 8.0        Protein      NEG mg/dL      Glucose      NEG mg/dL      Ketone      NEG mg/dL      Bilirubin      NEG        Blood      NEG        Urobilinogen      0.2 - 1.0 EU/dL      Nitrites      NEG        Leukocyte Esterase      NEG        Cholesterol, total      <200 MG/DL  122    Triglyceride      <150 MG/DL  87    HDL Cholesterol      40 - 60 MG/DL  50    LDL, calculated      0 - 100 MG/DL  54.6    VLDL, calculated      MG/DL  17.4    CHOL/HDL Ratio      0 - 5.0    2.4    Microalbumin,urine random      0 - 3.0 MG/DL      Creatinine, urine      30 - 125 mg/dL      Microalbumin/Creat. Ratio      0 - 30 mg/g      Hemoglobin A1c, (calculated)      4.2 - 5.6 %   6.0 (H)   Est. average glucose      mg/dL   126   TSH      0.36 - 3.74 uIU/mL        TESTS  ekg  [id number]    Spirometry mild restriction     Assessment/Plan:      Diabetes - stable  Hypertension - stable  Hyperlipidemia - stable  Asthma stable  gerd stable  Rheumatoid arthirtis stable  In remission      Diagnoses and all orders for this visit:    1. Routine general medical examination at a health care facility    2. Essential hypertension  -     AMB POC EKG ROUTINE W/ 12 LEADS, INTER & REP  -     hydroCHLOROthiazide (HYDRODIURIL) 25 mg tablet; Take 1 Tab by mouth daily. -     losartan (COZAAR) 50 mg tablet; Take 1 Tab by mouth daily. -     simvastatin (ZOCOR) 40 mg tablet; Take 1 Tab by mouth nightly. -     linagliptin (TRADJENTA) 5 mg tablet; Take 1 Tab by mouth daily. 3. Mild asthma without complication, unspecified whether persistent  -     AMB POC SPIROMETRY W/O BRONCHODILATOR    4. Gastroesophageal reflux disease with esophagitis  -     hydroCHLOROthiazide (HYDRODIURIL) 25 mg tablet; Take 1 Tab by mouth daily. -     losartan (COZAAR) 50 mg tablet; Take 1 Tab by mouth daily. -     simvastatin (ZOCOR) 40 mg tablet; Take 1 Tab by mouth nightly. -     linagliptin (TRADJENTA) 5 mg tablet; Take 1 Tab by mouth daily. 5. Controlled type 2 diabetes mellitus with diabetic nephropathy, without long-term current use of insulin (HCC)  -     hydroCHLOROthiazide (HYDRODIURIL) 25 mg tablet; Take 1 Tab by mouth daily. -     losartan (COZAAR) 50 mg tablet; Take 1 Tab by mouth daily. -     simvastatin (ZOCOR) 40 mg tablet; Take 1 Tab by mouth nightly. -     linagliptin (TRADJENTA) 5 mg tablet; Take 1 Tab by mouth daily. 6. Rheumatoid arthritis involving multiple sites with positive rheumatoid factor (HCC)  -     hydroCHLOROthiazide (HYDRODIURIL) 25 mg tablet;  Take 1 Tab by mouth daily. -     losartan (COZAAR) 50 mg tablet; Take 1 Tab by mouth daily. -     simvastatin (ZOCOR) 40 mg tablet; Take 1 Tab by mouth nightly. -     linagliptin (TRADJENTA) 5 mg tablet; Take 1 Tab by mouth daily. 7. Pure hypercholesterolemia  -     hydroCHLOROthiazide (HYDRODIURIL) 25 mg tablet; Take 1 Tab by mouth daily. -     losartan (COZAAR) 50 mg tablet; Take 1 Tab by mouth daily. -     simvastatin (ZOCOR) 40 mg tablet; Take 1 Tab by mouth nightly. -     linagliptin (TRADJENTA) 5 mg tablet; Take 1 Tab by mouth daily. 8. Type 2 diabetes mellitus with nephropathy ([facility] [address]    9. Encounter for immunization  -     Influenza virus vaccine ([address]) 72 years and older ([address])  -     Administration fee () for Medicare insured patients    Other orders  -     tiotropium bromide (SPIRIVA RESPIMAT) 2.5 mcg/actuation inhaler; Take 2 Puffs by inhalation daily. -     budesonide-formoterol (SYMBICORT) 160-4.5 mcg/actuation HFAA; Take 2 Puffs by inhalation two (2) times a day.           Lab review: labs are reviewed, up to date and normal

## 2018-01-23 NOTE — MR AVS SNAPSHOT
[address] [id number] [facility] [address] [address] 
[phone] Patient: Sonia Lai MRN: [id number]:[id number] Visit Information Date & Time Provider Department Dept. Phone Encounter #  
 1/23/2018 10:00 [place] [address] [address] [phone] [id number] Follow-up Instructions Return in about 2 weeks (around 2/6/2018) for retinal scan  also schedule 3 mo EOV with aic next visit. Upcoming Health Maintenance Date Due  
 EYE EXAM RETINAL OR DILATED Q1 11/5/2016 Influenza Age 5 to Adult 8/1/2017 FOOT EXAM Q1 1/10/2018 MEDICARE YEARLY EXAM 1/11/2018 HEMOGLOBIN A1C Q6M 7/16/2018 MICROALBUMIN Q1 1/16/2019 LIPID PANEL Q1 1/16/2019 BREAST CANCER SCRN MAMMOGRAM 4/19/2019 GLAUCOMA SCREENING Q2Y 1/22/2020 DTaP/Tdap/Td series (2 - Td) 10/24/2021 Allergies as of 1/23/2018  Review Complete On: 7/24/2017 By: Yocasta Arias DO Severity Noted Reaction Type Reactions Percocet [Oxycodone-acetaminophen]  01/24/2012    Nausea and Vomiting Ultram [Tramadol]  04/29/2011    Swelling Current Immunizations  Reviewed on 12/16/2014 Name Date Influenza High Dose Vaccine PF  Incomplete, 1/24/2017, 9/30/2015 Influenza Vaccine 12/16/2014, 12/2/2013  1:02 PM  
 Influenza Vaccine Split 11/27/2012, 10/24/2011 Pneumococcal Conjugate (PCV-13) 9/30/2015 TDAP Vaccine 10/24/2011 ZZZ-RETIRED (DO NOT USE) Pneumococcal Vaccine (Unspecified Type) 10/24/2011 Not reviewed this visit You Were Diagnosed With   
  
 Codes Comments Routine general medical examination at a health care facility    -  Primary ICD-10-CM: Z00.00 ICD-9-CM: V70.0 Essential hypertension     ICD-10-CM: I10 
ICD-9-CM: 401.9 Mild asthma without complication, unspecified whether persistent     ICD-10-CM: J45.909 ICD-9-CM: 493.90   
 Gastroesophageal reflux disease with esophagitis     ICD-10-CM: K21.0 ICD-9-CM: 530.11 Controlled type 2 diabetes mellitus with diabetic nephropathy, without long-term current use of insulin (HCC)     ICD-10-CM: E11.21 
ICD-9-CM: 250.40, 583.81 Rheumatoid arthritis involving multiple sites with positive rheumatoid factor (HCC)     ICD-10-CM: M05.79 ICD-9-CM: 714.0 Pure hypercholesterolemia     ICD-10-CM: E78.00 ICD-9-CM: 272.0 Type 2 diabetes mellitus with nephropathy (HCC)     ICD-10-CM: E11.21 
ICD-9-CM: 250.40, 583.81 Encounter for immunization     ICD-10-CM: M91 ICD-9-CM: V03.89 Vitals BP Pulse Temp Resp Height(growth percentile) Weight(growth percentile) 129/65 (BP 1 Location: Right arm, BP Patient Position: Sitting) 74 98.2 °F (36.8 °C) (Oral) 16 5' 2\" (1.575 m) 171 lb (77.6 kg) SpO2 BMI OB Status Smoking Status 97% 31.28 kg/m2 Hysterectomy Never Smoker BMI and BSA Data Body Mass Index Body Surface Area  
 31.28 kg/m 2 1.84 m 2 Preferred Pharmacy Pharmacy Name Phone 500 [facility] [address], [address] [place] [address] [phone] Your Updated Medication List  
  
   
This list is accurate as of: 1/23/18 10:46 AM.  Always use your most recent med list.  
  
  
  
  
 budesonide-formoterol 160-4.5 mcg/actuation Hfaa Commonly known as:  SYMBICORT Take 2 Puffs by inhalation two (2) times a day. hydroCHLOROthiazide 25 mg tablet Commonly known as:  HYDRODIURIL Take 1 Tab by mouth daily. linagliptin 5 mg tablet Commonly known as:  Jose Burow Take 1 Tab by mouth daily. losartan 50 mg tablet Commonly known as:  COZAAR Take 1 Tab by mouth daily. simvastatin 40 mg tablet Commonly known as:  ZOCOR Take 1 Tab by mouth nightly. tiotropium bromide 2.5 mcg/actuation inhaler Commonly known as:  Steve Honghr Take 2 Puffs by inhalation daily. Prescriptions Printed Refills  
 hydroCHLOROthiazide (HYDRODIURIL) 25 mg tablet 4 Sig: Take 1 Tab by mouth daily. Class: Print Route: Oral  
 losartan (COZAAR) 50 mg tablet 4 Sig: Take 1 Tab by mouth daily. Class: Print Route: Oral  
 simvastatin (ZOCOR) 40 mg tablet 4 Sig: Take 1 Tab by mouth nightly. Class: Print Route: Oral  
 linagliptin (TRADJENTA) 5 mg tablet 4 Sig: Take 1 Tab by mouth daily. Class: Print Route: Oral  
 tiotropium bromide (SPIRIVA RESPIMAT) 2.5 mcg/actuation inhaler 12 Sig: Take 2 Puffs by inhalation daily. Class: Print Route: Inhalation  
 budesonide-formoterol (SYMBICORT) 160-4.5 mcg/actuation HFAA 12 Sig: Take 2 Puffs by inhalation two (2) times a day. Class: Print Route: Inhalation We Performed the Following ADMIN INFLUENZA VIRUS VAC [ HCPCS] AMB POC EKG ROUTINE W/ 12 LEADS, INTER & REP [01121 CPT(R)] AMB POC SPIROMETRY W/O BRONCHODILATOR [26419 CPT(R)] INFLUENZA VIRUS VACCINE, HIGH DOSE SEASONAL, PRESERVATIVE FREE [91938 CPT(R)] Follow-up Instructions Return in about 2 weeks (around 2/6/2018) for retinal scan  also schedule 3 mo EOV with aic next visit. To-Do List   
 04/20/2018 10:45 AM  
  Appointment with [facility] 2 at [facility] 63 ([phone]) OUTSIDE FILMS  - Any outside films related to the study being scheduled should be brought with you on the day of the exam.  If this cannot be done there may be a delay in the reading of the study. MEDICATIONS  - Patient must bring a complete list of all medications currently taking to include prescriptions, over-the-counter meds, herbals, vitamins & any dietary supplements  GENERAL INSTRUCTIONS  - On the day of your exam do not use any bath powder, deodorant or lotions on the armpit area. -Tenderness of breasts may cause an increase of discomfort during procedure.   If you are experiencing breast tenderness on the day of your appointment and would like to reschedule, please call [phone].  
  
 04/20/2018 11:00 AM  
  Appointment with [facility] BONE DENSITY RM 1 at [address] ([phone]) OUTSIDE FILMS  - Any outside films related to the study being scheduled should be brought with you on the day of the exam.  If this cannot be done there may be a delay in the reading of the study. MEDICATIONS  - Patient must bring a complete list of all medications currently taking to include prescriptions, over-the-counter meds, herbals, vitamins & any dietary supplements - Patient must discontinue use of calcium, vitamins, or calcium supplements including antacids (calcium based) 24 hours before scan. CHECK IN INSTRUCTIONS  For DEXA/Bone Density Studies:  Check in to the [facility] [email or web address] Desk 15 minutes prior to your appointment. [address] Introducing [facility] & HEALTH SERVICES! [facility] introduces First30Days patient portal. Now you can access parts of your medical record, email your doctor's office, and request medication refills online. 1. In your internet browser, go to [email or web address] 2. Click on the First Time User? Click Here link in the Sign In box. You will see the New Member Sign Up page. 3. Enter your First30Days Access Code exactly as it appears below. You will not need to use this code after youve completed the sign-up process. If you do not sign up before the expiration date, you must request a new code. · First30Days Access Code: [email or web address] Expires: 4/23/2018  7:14 AM 
 
4. Enter the last four digits of your Social Security Number (xxxx) and Date of Birth (mm/dd/yyyy) as indicated and click Submit. You will be taken to the next sign-up page. 5. Create a Hoardt ID. This will be your Hoardt login ID and cannot be changed, so think of one that is secure and easy to remember. 6. Create a X2TV password. You can change your password at any time. 7. Enter your Password Reset Question and Answer. This can be used at a later time if you forget your password. 8. Enter your e-mail address. You will receive e-mail notification when new information is available in [address] 9. Click Sign Up. You can now view and download portions of your medical record. 10. Click the Download Summary menu link to download a portable copy of your medical information. If you have questions, please visit the Frequently Asked Questions section of the X2TV website. Remember, X2TV is NOT to be used for urgent needs. For medical emergencies, dial 911. Now available from your iPhone and Android! Please provide this summary of care documentation to your next provider. Your primary care clinician is listed as [id number] [facility]. If you have any questions after today's visit, please call [phone].

## 2018-01-24 LAB
LEFT EYE DIABETIC RETINOPATHY: NORMAL
LEFT EYE IMAGE QUALITY: NORMAL
LEFT EYE MACULAR EDEMA: NORMAL
LEFT EYE OTHER RETINOPATHY: NORMAL
RESULT: NORMAL
RIGHT EYE DIABETIC RETINOPATHY: NORMAL
RIGHT EYE IMAGE QUALITY: NORMAL
RIGHT EYE MACULAR EDEMA: NORMAL
RIGHT EYE OTHER RETINOPATHY: NORMAL
SEVERITY: NORMAL

## 2018-02-12 ENCOUNTER — TELEPHONE (OUTPATIENT)
Dept: FAMILY MEDICINE CLINIC | Age: 68
End: 2018-02-12

## 2018-02-12 NOTE — TELEPHONE ENCOUNTER
Spoke with patient regarding the request to change her spiriva medication to a medication that her insurance will cover. Patient was advised to call Dr. Viraj Olivia to inquire on what medication is equivalent to the current medication. Patient acknowledge understanding and voices no further concerns at this time.

## 2018-02-28 ENCOUNTER — TELEPHONE (OUTPATIENT)
Dept: FAMILY MEDICINE CLINIC | Age: 68
End: 2018-02-28

## 2018-02-28 NOTE — TELEPHONE ENCOUNTER
Called pt on mobile number and left a vm regarding an appointment to address alternative medication.  Awaiting pt callback

## 2018-03-08 ENCOUNTER — OFFICE VISIT (OUTPATIENT)
Dept: FAMILY MEDICINE CLINIC | Age: 68
End: 2018-03-08

## 2018-03-08 VITALS
WEIGHT: 173 LBS | RESPIRATION RATE: 16 BRPM | DIASTOLIC BLOOD PRESSURE: 60 MMHG | HEART RATE: 72 BPM | HEIGHT: 62 IN | OXYGEN SATURATION: 92 % | TEMPERATURE: 96.7 F | SYSTOLIC BLOOD PRESSURE: 139 MMHG | BODY MASS INDEX: 31.83 KG/M2

## 2018-03-08 DIAGNOSIS — J43.9 PULMONARY EMPHYSEMA, UNSPECIFIED EMPHYSEMA TYPE (HCC): Primary | ICD-10-CM

## 2018-03-08 NOTE — MR AVS SNAPSHOT
[address] [facility] 
 
 
 [address] [facility] [address] [facility] 83 [address] 
[phone] Patient: Sonya Chandler MRN: [id number] UXL:0/92/3111 Visit Information Date & Time Provider Department Dept. Phone Encounter #  
 3/8/2018 10:30 AM Ian Silverman, [address] [place] [address] [phone] [id number] Follow-up Instructions Return in about 6 months (around 9/8/2018) for spirometry next visit, EOV. Follow-up and Disposition History Your Appointments 4/24/2018  9:00 AM  
ROUTINE CARE with Ian Silverman,  [address] [address]) Appt Note: 3 mon EOV with aic next visit. [address] [facility] [address] [facility] 83 222 [facility]  
  
   
 [address] [facility] [address] Upcoming Health Maintenance Date Due HEMOGLOBIN A1C Q6M 7/16/2018 MICROALBUMIN Q1 1/16/2019 LIPID PANEL Q1 1/16/2019 FOOT EXAM Q1 1/23/2019 EYE EXAM RETINAL OR DILATED Q1 1/23/2019 BREAST CANCER SCRN MAMMOGRAM 4/19/2019 GLAUCOMA SCREENING Q2Y 1/22/2020 DTaP/Tdap/Td series (2 - Td) 10/24/2021 Allergies as of 3/8/2018  Review Complete On: 7/24/2017 By: Ian Silverman,  Severity Noted Reaction Type Reactions Percocet [Oxycodone-acetaminophen]  01/24/2012    Nausea and Vomiting Ultram [Tramadol]  04/29/2011    Swelling Current Immunizations  Reviewed on 12/16/2014 Name Date Influenza High Dose Vaccine PF 1/23/2018, 1/24/2017, 9/30/2015 Influenza Vaccine 12/16/2014, 12/2/2013  1:02 PM  
 Influenza Vaccine Split 11/27/2012, 10/24/2011 Pneumococcal Conjugate (PCV-13) 9/30/2015 TDAP Vaccine 10/24/2011 ZZZ-RETIRED (DO NOT USE) Pneumococcal Vaccine (Unspecified Type) 10/24/2011 Not reviewed this visit You Were Diagnosed With   
  
 Codes Comments Pulmonary emphysema, unspecified emphysema type ([facility]    -  Primary ICD-10-CM: J43.9 ICD-9-CM: 492.8 Vitals BP Pulse Temp Resp Height(growth percentile) Weight(growth percentile) 139/60 (BP 1 Location: Right arm, BP Patient Position: Sitting) 72 96.7 °F (35.9 °C) (Oral) 16 5' 2\" (1.575 m) 173 lb (78.5 kg) SpO2 BMI OB Status Smoking Status 92% 31.64 kg/m2 Hysterectomy Never Smoker BMI and BSA Data Body Mass Index Body Surface Area  
 31.64 kg/m 2 1.85 m 2 Preferred Pharmacy Pharmacy Name Phone [phone], [phone] Your Updated Medication List  
  
   
This list is accurate as of 3/8/18 10:52 AM.  Always use your most recent med list.  
  
  
  
  
 budesonide-formoterol 160-4.5 mcg/actuation Hfaa Commonly known as:  SYMBICORT Take 2 Puffs by inhalation two (2) times a day. hydroCHLOROthiazide 25 mg tablet Commonly known as:  HYDRODIURIL Take 1 Tab by mouth daily. linagliptin 5 mg tablet Commonly known as:  Catheline [place] Take 1 Tab by mouth daily. losartan 50 mg tablet Commonly known as:  COZAAR Take 1 Tab by mouth daily. simvastatin 40 mg tablet Commonly known as:  ZOCOR Take 1 Tab by mouth nightly. umeclidinium 62.5 mcg/actuation inhaler Commonly known as:  INCRUSE ELLIPTA Take 1 Puff by inhalation daily. Prescriptions Printed Refills  
 umeclidinium (INCRUSE ELLIPTA) 62.5 mcg/actuation inhaler 12 Sig: Take 1 Puff by inhalation daily. Class: Print Route: Inhalation Follow-up Instructions Return in about 6 months (around 9/8/2018) for spirometry next visit, EOV. To-Do List   
 04/20/2018 10:45 AM  
  Appointment with [facility] 2 at [facility] 63 ([phone]) OUTSIDE FILMS  - Any outside films related to the study being scheduled should be brought with you on the day of the exam.  If this cannot be done there may be a delay in the reading of the study.   MEDICATIONS  - Patient must bring a complete list of all medications currently taking to include prescriptions, over-the-counter meds, herbals, vitamins & any dietary supplements  GENERAL INSTRUCTIONS  - On the day of your exam do not use any bath powder, deodorant or lotions on the armpit area. -Tenderness of breasts may cause an increase of discomfort during procedure. If you are experiencing breast tenderness on the day of your appointment and would like to reschedule, please call [phone].  
  
 04/20/2018 11:00 AM  
  Appointment with [facility] BONE DENSITY RM 1 at [address] ([phone]) OUTSIDE FILMS  - Any outside films related to the study being scheduled should be brought with you on the day of the exam.  If this cannot be done there may be a delay in the reading of the study. MEDICATIONS  - Patient must bring a complete list of all medications currently taking to include prescriptions, over-the-counter meds, herbals, vitamins & any dietary supplements - Patient must discontinue use of calcium, vitamins, or calcium supplements including antacids (calcium based) 24 hours before scan. CHECK IN INSTRUCTIONS  For DEXA/Bone Density Studies:  Check in to the [facility] Desk 15 minutes prior to your appointment. [address] Introducing [facility] & HEALTH SERVICES! Reid Moreno introduces AppZero patient portal. Now you can access parts of your medical record, email your doctor's office, and request medication refills online. 1. In your internet browser, go to [email or web address] 2. Click on the First Time User? Click Here link in the Sign In box. You will see the New Member Sign Up page. 3. Enter your AppZero Access Code exactly as it appears below. You will not need to use this code after youve completed the sign-up process. If you do not sign up before the expiration date, you must request a new code. · Recochem Access Code: [email or web address] Expires: 4/23/2018  7:14 AM 
 
4. Enter the last four digits of your Social Security Number (xxxx) and Date of Birth (mm/dd/yyyy) as indicated and click Submit. You will be taken to the next sign-up page. 5. Create a Recochem ID. This will be your Recochem login ID and cannot be changed, so think of one that is secure and easy to remember. 6. Create a Recochem password. You can change your password at any time. 7. Enter your Password Reset Question and Answer. This can be used at a later time if you forget your password. 8. Enter your e-mail address. You will receive e-mail notification when new information is available in [address] 9. Click Sign Up. You can now view and download portions of your medical record. 10. Click the Download Summary menu link to download a portable copy of your medical information. If you have questions, please visit the Frequently Asked Questions section of the Recochem website. Remember, Recochem is NOT to be used for urgent needs. For medical emergencies, dial 911. Now available from your iPhone and Android! Please provide this summary of care documentation to your next provider. Your primary care clinician is listed as [id number] [facility]. If you have any questions after today's visit, please call [phone].

## 2018-03-08 NOTE — PROGRESS NOTES
Marilee Taylor is a 79 y.o. female presented to clinic for medication evaluation for inhaler. Pt's insurance will not cover. Pt denies any pain or concerns at this time. 1. Have you been to the ER, urgent care clinic since your last visit? Hospitalized since your last visit? No    2. Have you seen or consulted any other health care providers outside of the [facility] since your last visit? Include any pap smears or colon screening. No     Learning Assessment 12/2/2013   PRIMARY LEARNER Patient   PRIMARY LANGUAGE ENGLISH   LEARNER PREFERENCE PRIMARY LISTENING   ANSWERED BY patient   RELATIONSHIP SELF        Patient verbally agrees to permit the Students working in [address] [place] [address] office to observe and participate in medical care during the appointment today, including, where appropriate, providing direct medical care to patient under the physicians direct supervision. Patient agrees that he/she have been given the opportunity to refuse to give such consent and may withdraw consent at any time during appointment. There are no preventive care reminders to display for this patient.

## 2018-03-08 NOTE — PROGRESS NOTES
Chiqui Granger is a 79 y.o.  female and presents with    Chief Complaint   Patient presents with    COPD           Subjective:    COPD Review:  The patient is being seen for follow up of COPD. Oxygen: She currently is not on home oxygen therapy. Symptoms: symptoms worse at night. Patient uses 2 pillows at night. Patient does not smoke cigarettes. Additional Concerns:          Patient Active Problem List    Diagnosis Date Noted    Pulmonary emphysema ([facility] 03/08/2018    Type 2 diabetes mellitus with nephropathy ([facility] 01/23/2018    Advance directive in chart 07/11/2016    Asthma 12/02/2013    Pure hypercholesterolemia 10/24/2011    GERD (gastroesophageal reflux disease) 04/29/2011    HTN (hypertension) 04/29/2011    Rheumatoid arthritis involving multiple sites with positive rheumatoid factor ([facility] 04/29/2011     Current Outpatient Prescriptions   Medication Sig Dispense Refill    umeclidinium (INCRUSE ELLIPTA) 62.5 mcg/actuation inhaler Take 1 Puff by inhalation daily. 3 Inhaler 12    hydroCHLOROthiazide (HYDRODIURIL) 25 mg tablet Take 1 Tab by mouth daily. 90 Tab 4    losartan (COZAAR) 50 mg tablet Take 1 Tab by mouth daily. 90 Tab 4    simvastatin (ZOCOR) 40 mg tablet Take 1 Tab by mouth nightly. 90 Tab 4    linagliptin (TRADJENTA) 5 mg tablet Take 1 Tab by mouth daily. 90 Tab 4    budesonide-formoterol (SYMBICORT) 160-4.5 mcg/actuation HFAA Take 2 Puffs by inhalation two (2) times a day.  3 Inhaler 12     Allergies   Allergen Reactions    Percocet [Oxycodone-Acetaminophen] Nausea and Vomiting    Ultram [Tramadol] Swelling     Past Medical History:   Diagnosis Date    Advance directive in chart 7/11/2016    Asthma 12/2/2013    GERD (gastroesophageal reflux disease) 4/29/2011    HTN (hypertension) 4/29/2011    Rheumatoid arthritis(714.0) 4/29/2011    Type II or unspecified type diabetes mellitus without mention of complication, not stated as uncontrolled 4/29/2011 Past Surgical History:   Procedure Laterality Date    HX BREAST BIOPSY Right 1970's    rt benign    HX HYSTERECTOMY      HX NARCISO AND BSO  2004     Family History   Problem Relation Age of Onset    Hypertension Mother    [employer] Arthritis-rheumatoid Mother     Stroke Father      Social History   Substance Use Topics    Smoking status: Never Smoker    Smokeless tobacco: Never Used    Alcohol use No       ROS       All other systems reviewed and are negative. Objective:  Vitals:    03/08/18 1021   BP: 139/60   Pulse: 72   Resp: 16   Temp: 96.7 °F (35.9 °C)   TempSrc: Oral   SpO2: 92%   Weight: 173 lb (78.5 kg)   Height: 5' 2\" (1.575 m)   PainSc:   0 - No pain                 alert, well appearing, and in no distress and oriented to person, place, and time  Chest - clear to auscultation, no wheezes, rales or rhonchi, symmetric air entry  Heart - normal rate, regular rhythm, normal S1, S2, no murmurs, rubs, clicks or gallops  Abdomen - soft, nontender, nondistended, no masses or organomegaly        LABS     TESTS      Assessment/Plan:    Copd yhas been very stable on spirva  Now her ins is demanding her to be switch to incruise elipta  Will try this and f/u if she has problems  Otherwise 6mo    Lab review: labs are reviewed, up to date and normal    Diagnoses and all orders for this visit:    1. Pulmonary emphysema, unspecified emphysema type (HCC)  -     umeclidinium (INCRUSE ELLIPTA) 62.5 mcg/actuation inhaler; Take 1 Puff by inhalation daily. I have discussed the diagnosis with the patient and the intended plan as seen in the above orders. The patient has received an after-visit summary and questions were answered concerning future plans. I have discussed medication side effects and warnings with the patient as well. I have reviewed the plan of care with the patient, accepted their input and they are in agreement with the treatment goals.      Follow-up Disposition:  Return in about 6 months (around 9/8/2018) for spirometry next visit, EOV.

## 2018-03-30 ENCOUNTER — TELEPHONE (OUTPATIENT)
Dept: FAMILY MEDICINE CLINIC | Age: 68
End: 2018-03-30

## 2018-03-30 NOTE — LETTER
4/3/2018 4:20 PM 
 
Ms. Herrera [address] [address] Dear Ms. Conte: We have attempted to call to schedule an appointment regarding your request for an emergency inhaler. Please call our office at [phone] and schedule a follow up appointment to discuss your request. inhaler.  
 
 
 
Sincerely, 
 
 
Merlinda Redbird., DO

## 2018-03-30 NOTE — TELEPHONE ENCOUNTER
Pt. Is requesting an emergency inhaler. I advised her that she had 12 refills for the symbicort. Not sure if she is talking about same medication or something different.  Please advise

## 2018-04-03 NOTE — TELEPHONE ENCOUNTER
Patient was called by Mariajose Saucedo who reports that patient was unavailable to schedule appointment and there was no voicemail set up to leave message. Will send patient an appointment letter. No further action required.

## 2018-04-16 ENCOUNTER — TELEPHONE (OUTPATIENT)
Dept: FAMILY MEDICINE CLINIC | Age: 68
End: 2018-04-16

## 2018-04-16 NOTE — TELEPHONE ENCOUNTER
Patient stated that disregard her request to get an inhaler because she cannot come in for an appointment due to transportation issue. No further information was given when asked.

## 2018-04-20 ENCOUNTER — HOSPITAL ENCOUNTER (OUTPATIENT)
Dept: MAMMOGRAPHY | Age: 68
Discharge: HOME | End: 2018-04-20
Attending: FAMILY MEDICINE
Payer: MEDICARE

## 2018-04-20 ENCOUNTER — HOSPITAL ENCOUNTER (OUTPATIENT)
Dept: GENERAL RADIOLOGY | Age: 68
Discharge: HOME | End: 2018-04-20
Attending: FAMILY MEDICINE
Payer: MEDICARE

## 2018-04-20 DIAGNOSIS — Z78.0 POST-MENOPAUSAL: ICD-10-CM

## 2018-04-20 DIAGNOSIS — Z12.31 ENCOUNTER FOR SCREENING MAMMOGRAM FOR MALIGNANT NEOPLASM OF BREAST: ICD-10-CM

## 2018-04-20 DIAGNOSIS — K21.00 GASTROESOPHAGEAL REFLUX DISEASE WITH ESOPHAGITIS: ICD-10-CM

## 2018-04-20 DIAGNOSIS — E78.00 PURE HYPERCHOLESTEROLEMIA: ICD-10-CM

## 2018-04-20 DIAGNOSIS — I10 ESSENTIAL HYPERTENSION: ICD-10-CM

## 2018-04-20 DIAGNOSIS — E11.9 TYPE 2 DIABETES MELLITUS WITHOUT COMPLICATION (HCC): ICD-10-CM

## 2018-04-20 DIAGNOSIS — M05.79 RHEUMATOID ARTHRITIS INVOLVING MULTIPLE SITES WITH POSITIVE RHEUMATOID FACTOR (HCC): ICD-10-CM

## 2018-04-20 DIAGNOSIS — E11.21 CONTROLLED TYPE 2 DIABETES MELLITUS WITH DIABETIC NEPHROPATHY, WITHOUT LONG-TERM CURRENT USE OF INSULIN (HCC): ICD-10-CM

## 2018-04-20 DIAGNOSIS — M81.0 AGE-RELATED OSTEOPOROSIS WITHOUT CURRENT PATHOLOGICAL FRACTURE: ICD-10-CM

## 2018-04-20 PROCEDURE — 77063 BREAST TOMOSYNTHESIS BI: CPT

## 2018-04-20 PROCEDURE — 77080 DXA BONE DENSITY AXIAL: CPT

## 2018-07-05 ENCOUNTER — TELEPHONE (OUTPATIENT)
Dept: FAMILY MEDICINE CLINIC | Age: 68
End: 2018-07-05

## 2018-07-05 NOTE — TELEPHONE ENCOUNTER
Spoke with patient. Verified 2 patient identifiers. Patient inquired if she could benefit from taking vitamin D supplements as she recently seen on TV that it helps with bone strength. Patient encouraged that taking Vitamin D supplements would indeed help with bone health. Patient acknowledges verbalized understanding. No further questions or comments.

## 2018-08-15 ENCOUNTER — TELEPHONE (OUTPATIENT)
Dept: FAMILY MEDICINE CLINIC | Age: 68
End: 2018-08-15

## 2018-08-15 NOTE — TELEPHONE ENCOUNTER
Call placed to patient (obtained 2 patient ID, identified self and office) to let her know that we have not received any paperwork from her insurance company. Patient stated that she was told that it was sent to the office and that it should be here soon. She stated that she has not been able to check her diabetes level at home and needs the device so she needs the paperwork signed. Explained to her that she will be able to discussed the issue during her next office visit since we have not received any paperwork.  No other questions or concerns at this time, sending to provider

## 2018-08-15 NOTE — TELEPHONE ENCOUNTER
Pt called in because her insurance said they were going to fax a form over so that she could start receiving diabetic supplies. She stated she would like for a nurse to call her back to confirm that  has received the form. Please assist

## 2018-09-10 ENCOUNTER — OFFICE VISIT (OUTPATIENT)
Dept: FAMILY MEDICINE CLINIC | Age: 68
End: 2018-09-10

## 2018-09-10 VITALS
DIASTOLIC BLOOD PRESSURE: 60 MMHG | HEART RATE: 62 BPM | OXYGEN SATURATION: 97 % | BODY MASS INDEX: 30.84 KG/M2 | HEIGHT: 62 IN | WEIGHT: 167.6 LBS | SYSTOLIC BLOOD PRESSURE: 130 MMHG | TEMPERATURE: 97.7 F | RESPIRATION RATE: 16 BRPM

## 2018-09-10 DIAGNOSIS — E78.00 PURE HYPERCHOLESTEROLEMIA: ICD-10-CM

## 2018-09-10 DIAGNOSIS — M05.79 RHEUMATOID ARTHRITIS INVOLVING MULTIPLE SITES WITH POSITIVE RHEUMATOID FACTOR (HCC): ICD-10-CM

## 2018-09-10 DIAGNOSIS — J45.909 MILD ASTHMA WITHOUT COMPLICATION, UNSPECIFIED WHETHER PERSISTENT: ICD-10-CM

## 2018-09-10 DIAGNOSIS — G47.9 SLEEP DISORDER: ICD-10-CM

## 2018-09-10 DIAGNOSIS — J43.9 PULMONARY EMPHYSEMA, UNSPECIFIED EMPHYSEMA TYPE (HCC): ICD-10-CM

## 2018-09-10 DIAGNOSIS — E11.21 TYPE 2 DIABETES MELLITUS WITH NEPHROPATHY (HCC): Primary | ICD-10-CM

## 2018-09-10 DIAGNOSIS — K21.00 GASTROESOPHAGEAL REFLUX DISEASE WITH ESOPHAGITIS: ICD-10-CM

## 2018-09-10 DIAGNOSIS — I10 ESSENTIAL HYPERTENSION: ICD-10-CM

## 2018-09-10 LAB — HBA1C MFR BLD HPLC: 5.7 %

## 2018-09-10 RX ORDER — ALBUTEROL SULFATE 90 UG/1
2 AEROSOL, METERED RESPIRATORY (INHALATION)
Qty: 1 INHALER | Refills: 12 | Status: SHIPPED | OUTPATIENT
Start: 2018-09-10 | End: 2019-02-11 | Stop reason: SDUPTHER

## 2018-09-10 NOTE — MR AVS SNAPSHOT
Dmitriy Medina 
 
 
 [address] [address] [address] 
[phone] Patient: Nav Duncan MRN: [id number] Visit Information Date & Time Provider Department Dept. Phone Encounter #  
 9/10/2018 12:30 PM Annie Bowser., [place] 6 [phone] [id number] Follow-up Instructions Return in about 5 years (around 9/10/2023) for MWE, physical, labs at next visit, mammo prior, dexa prior, EKG next visit, spirometry next visit. Follow-up and Disposition History Upcoming Health Maintenance Date Due HEMOGLOBIN A1C Q6M 7/16/2018 Influenza Age 5 to Adult 8/1/2018 MICROALBUMIN Q1 1/16/2019 LIPID PANEL Q1 1/16/2019 FOOT EXAM Q1 1/23/2019 EYE EXAM RETINAL OR DILATED Q1 1/23/2019 GLAUCOMA SCREENING Q2Y 1/22/2020 BREAST CANCER SCRN MAMMOGRAM 4/20/2020 DTaP/Tdap/Td series (2 - Td) 10/24/2021 Allergies as of 9/10/2018  Review Complete On: 9/10/2018 By: Annie Bowser., DO Severity Noted Reaction Type Reactions Percocet [Oxycodone-acetaminophen]  01/24/2012    Nausea and Vomiting Ultram [Tramadol]  04/29/2011    Swelling Current Immunizations  Reviewed on 12/16/2014 Name Date Influenza High Dose Vaccine PF 1/23/2018, 1/24/2017, 9/30/2015 Influenza Vaccine 12/16/2014, 12/2/2013  1:02 PM  
 Influenza Vaccine Split 11/27/2012, 10/24/2011 Pneumococcal Conjugate (PCV-13) 9/30/2015 TDAP Vaccine 10/24/2011 ZZZ-RETIRED (DO NOT USE) Pneumococcal Vaccine (Unspecified Type) 10/24/2011 Not reviewed this visit You Were Diagnosed With   
  
 Codes Comments Type 2 diabetes mellitus with nephropathy ([facility]    -  Primary ICD-10-CM: E11.21 
ICD-9-CM: 250.40, 583.81 Pulmonary emphysema, unspecified emphysema type ([facility]     ICD-10-CM: J43.9 ICD-9-CM: 492.8  Rheumatoid arthritis involving multiple sites with positive rheumatoid factor ([facility]     ICD-10-CM: M05.79 ICD-9-CM: 714.0 Gastroesophageal reflux disease with esophagitis     ICD-10-CM: K21.0 ICD-9-CM: 530.11 Essential hypertension     ICD-10-CM: I10 
ICD-9-CM: 401.9 Pure hypercholesterolemia     ICD-10-CM: E78.00 ICD-9-CM: 272.0 Mild asthma without complication, unspecified whether persistent     ICD-10-CM: J45.909 ICD-9-CM: 493.90 Sleep disorder     ICD-10-CM: G47.9 ICD-9-CM: 780.50 Vitals BP Pulse Temp Resp Height(growth percentile) Weight(growth percentile) 130/60 (BP 1 Location: Right arm, BP Patient Position: Sitting) 62 97.7 °F (36.5 °C) (Oral) 16 5' 2\" (1.575 m) 167 lb 9.6 oz (76 kg) SpO2 BMI OB Status Smoking Status 97% 30.65 kg/m2 Hysterectomy Never Smoker BMI and BSA Data Body Mass Index Body Surface Area  
 30.65 kg/m 2 1.82 m 2 Preferred Pharmacy Pharmacy Name Phone [id number] [phone] Your Updated Medication List  
  
   
This list is accurate as of 9/10/18  1:07 PM.  Always use your most recent med list.  
  
  
  
  
 albuterol 90 mcg/actuation inhaler Commonly known as:  PROVENTIL HFA, VENTOLIN HFA, PROAIR HFA Take 2 Puffs by inhalation every six (6) hours as needed for Wheezing or Shortness of Breath. budesonide-formoterol 160-4.5 mcg/actuation Hfaa Commonly known as:  SYMBICORT Take 2 Puffs by inhalation two (2) times a day. hydroCHLOROthiazide 25 mg tablet Commonly known as:  HYDRODIURIL Take 1 Tab by mouth daily. linagliptin 5 mg tablet Commonly known as:  [place] Karley Take 1 Tab by mouth daily. losartan 50 mg tablet Commonly known as:  COZAAR Take 1 Tab by mouth daily. simvastatin 40 mg tablet Commonly known as:  ZOCOR Take 1 Tab by mouth nightly. umeclidinium 62.5 mcg/actuation inhaler Commonly known as:  INCRUSE ELLIPTA Take 1 Puff by inhalation daily. Prescriptions Sent to Pharmacy Refills  
 albuterol (PROVENTIL HFA, VENTOLIN HFA, PROAIR HFA) 90 mcg/actuation inhaler 12 Sig: Take 2 Puffs by inhalation every six (6) hours as needed for Wheezing or Shortness of Breath. Class: Normal  
 Pharmacy: [address] [place] [address], [address] [place] [address]  #: [phone] Route: Inhalation We Performed the Following AMB POC HEMOGLOBIN A1C [06941 CPT(R)] AMB POC SPIROMETRY W/O BRONCHODILATOR [85244 CPT(R)] REFERRAL TO SLEEP STUDIES [REF99 Custom] Comments:  
 Sleep Medicine Consult. Dr Beverley Luna, or first available. Please evaluate for sleep apnea vs narcolepsy Follow-up Instructions Return in about 5 years (around 9/10/2023) for MWE, physical, labs at next visit, mammo prior, dexa prior, EKG next visit, spirometry next visit. Referral Information Referral ID Referred By Referred To  
  
 [id number] Joao Kendrick [address] [address] Phone: [phone] Fax: [phone] Visits Status Start Date End Date 1 New Request 9/10/18 9/10/19 If your referral has a status of pending review or denied, additional information will be sent to support the outcome of this decision. Introducing [facility]! Nahomy Gerardo introduces [employer] patient portal. Now you can access parts of your medical record, email your doctor's office, and request medication refills online. 1. In your internet browser, go to [email or web address] 2. Click on the First Time User? Click Here link in the Sign In box. You will see the New Member Sign Up page. 3. Enter your [employer] Access Code exactly as it appears below. You will not need to use this code after youve completed the sign-up process. If you do not sign up before the expiration date, you must request a new code. · [employer] Access Code: [email or web address] Expires: 12/9/2018  1:07 PM 
 
4. Enter the last four digits of your Social Security Number (xxxx) and Date of Birth (mm/dd/yyyy) as indicated and click Submit. You will be taken to the next sign-up page. 5. Create a [employer] ID. This will be your [employer] login ID and cannot be changed, so think of one that is secure and easy to remember. 6. Create a [employer] password. You can change your password at any time. 7. Enter your Password Reset Question and Answer. This can be used at a later time if you forget your password. 8. Enter your e-mail address. You will receive e-mail notification when new information is available in [address] 9. Click Sign Up. You can now view and download portions of your medical record. 10. Click the Download Summary menu link to download a portable copy of your medical information. If you have questions, please visit the Frequently Asked Questions section of the [employer] website. Remember, [employer] is NOT to be used for urgent needs. For medical emergencies, dial 911. Now available from your iPhone and Android! Please provide this summary of care documentation to your next provider. Your primary care clinician is listed as [phone] [facility]. If you have any questions after today's visit, please call [phone].

## 2018-09-10 NOTE — PROGRESS NOTES
Pham eMrrill is a 76 y.o.  female and presents with    Chief Complaint   Patient presents with    Arthritis    Diabetes    COPD    Hypertension    Cholesterol Problem    GERD           Subjective:    Cardiovascular Review:  The patient has diabetes, hypertension and hyperlipidemia. Diet and Lifestyle: not attempting to follow a low fat, low cholesterol diet  Home BP Monitoring: is not measured at home. Pertinent ROS: taking medications as instructed, no medication side effects noted, no TIA's, no chest pain on exertion, no dyspnea on exertion, no swelling of ankles. Diabetes Mellitus:  She has diabetes mellitus, and  diabetes, hypertension and hyperlipidemia. Diabetic ROS - diabetic diet compliance: compliant most of the time. Lab review: labs are reviewed, up to date and normal.   COPD Review:  The patient is being seen for follow up of COPD. Oxygen: She currently is not on home oxygen therapy. Symptoms: symptoms worse at night. Patient uses 2 pillows at night. Patient does not smoke cigarettes. Osteoarthritis and Chronic Pain:  Patient has rheumatoid arthritis, primarily affecting the diffuse. Symptoms onset: problem is longstanding. Rheumatological ROS: no current joint or muscle symptoms, essentially pain-free. Response to treatment plan: stable.      Additional Concerns: gerd stable on meds            Patient Active Problem List    Diagnosis Date Noted    Pulmonary emphysema ([facility] 03/08/2018    Type 2 diabetes mellitus with nephropathy ([facility] 01/23/2018    Advance directive in chart 07/11/2016    Asthma 12/02/2013    Pure hypercholesterolemia 10/24/2011    GERD (gastroesophageal reflux disease) 04/29/2011    HTN (hypertension) 04/29/2011    Rheumatoid arthritis involving multiple sites with positive rheumatoid factor ([facility] 04/29/2011     Current Outpatient Prescriptions   Medication Sig Dispense Refill    albuterol (PROVENTIL HFA, VENTOLIN HFA, PROAIR HFA) 90 mcg/actuation inhaler Take 2 Puffs by inhalation every six (6) hours as needed for Wheezing or Shortness of Breath. 1 Inhaler 12    umeclidinium (INCRUSE ELLIPTA) 62.5 mcg/actuation inhaler Take 1 Puff by inhalation daily. 3 Inhaler 12    hydroCHLOROthiazide (HYDRODIURIL) 25 mg tablet Take 1 Tab by mouth daily. 90 Tab 4    losartan (COZAAR) 50 mg tablet Take 1 Tab by mouth daily. 90 Tab 4    simvastatin (ZOCOR) 40 mg tablet Take 1 Tab by mouth nightly. 90 Tab 4    linagliptin (TRADJENTA) 5 mg tablet Take 1 Tab by mouth daily. 90 Tab 4    budesonide-formoterol (SYMBICORT) 160-4.5 mcg/actuation HFAA Take 2 Puffs by inhalation two (2) times a day. 3 Inhaler 12     Allergies   Allergen Reactions    Percocet [Oxycodone-Acetaminophen] Nausea and Vomiting    Ultram [Tramadol] Swelling     Past Medical History:   Diagnosis Date    Advance directive in chart 7/11/2016    Asthma 12/2/2013    GERD (gastroesophageal reflux disease) 4/29/2011    HTN (hypertension) 4/29/2011    Rheumatoid arthritis(714.0) 4/29/2011    Type II or unspecified type diabetes mellitus without mention of complication, not stated as uncontrolled 4/29/2011     Past Surgical History:   Procedure Laterality Date    HX BREAST BIOPSY Right 1970's    rt benign    HX HYSTERECTOMY      HX NARCISO AND BSO  2004     Family History   Problem Relation Age of Onset    Hypertension Mother    [facility] Arthritis-rheumatoid Mother     Stroke Father      Social History   Substance Use Topics    Smoking status: Never Smoker    Smokeless tobacco: Never Used    Alcohol use No       ROS       All other systems reviewed and are negative.       Objective:  Vitals:    09/10/18 1240   BP: 130/60   Pulse: 62   Resp: 16   Temp: 97.7 °F (36.5 °C)   TempSrc: Oral   SpO2: 97%   Weight: 167 lb 9.6 oz (76 kg)   Height: 5' 2\" (1.575 m)   PainSc:   0 - No pain                 alert, well appearing, and in no distress, oriented to person, place, and time and normal appearing weight  Chest - clear to auscultation, no wheezes, rales or rhonchi, symmetric air entry  Heart - normal rate, regular rhythm, normal S1, S2, no murmurs, rubs, clicks or gallops  Abdomen - soft, nontender, nondistended, no masses or organomegaly        LABS   aic  5.7    TESTS  Spirometry stable     Assessment/Plan:    Diabetes - stable  Hypertension - stable  Hyperlipidemia - stable  Rheumatoid arthrisi stable  Copd stable  gerd stable    Also note she can't drive because she falls asleep behind the wheel    Sounds like she falls asleep all day long frequently    Also has trouble sleeping at night. I wonder if this is narcolepsy or sleep apnea. Prev tx with sleep apnea and no help  Will refer to sleep studies with hugo/ marcus. Lab review: labs are reviewed, up to date and normal    Diagnoses and all orders for this visit:    1. Type 2 diabetes mellitus with nephropathy (HCC)  -     AMB POC HEMOGLOBIN A1C    2. Pulmonary emphysema, unspecified emphysema type (HCC)  -     AMB POC SPIROMETRY W/O BRONCHODILATOR    3. Rheumatoid arthritis involving multiple sites with positive rheumatoid factor ([facility]    4. Gastroesophageal reflux disease with esophagitis    5. Essential hypertension    6. Pure hypercholesterolemia    7. Mild asthma without complication, unspecified whether persistent    8. Sleep disorder  -     REFERRAL TO SLEEP STUDIES    Other orders  -     albuterol (PROVENTIL HFA, VENTOLIN HFA, PROAIR HFA) 90 mcg/actuation inhaler; Take 2 Puffs by inhalation every six (6) hours as needed for Wheezing or Shortness of Breath. I have discussed the diagnosis with the patient and the intended plan as seen in the above orders. The patient has received an after-visit summary and questions were answered concerning future plans. I have discussed medication side effects and warnings with the patient as well.  I have reviewed the plan of care with the patient, accepted their input and they are in agreement with the treatment goals. Follow-up Disposition:  Return in about 5 months (around 2/10/2019) for MWE, physical, labs at next visit, mammo prior, dexa prior, EKG next visit, spirometry next visit.

## 2018-09-10 NOTE — PROGRESS NOTES
Chung Gee is a 76 y.o. female presents today for follow up on her diabetes and hypertension. Patient reports that when she gets out of bed and walks a short distance she is out of breath. Patient reports of no physical pain but does mention grieving pain from the loss of her daughter in May 2018. Pt is in Room # 4        Depression Screening: Completed  Abuse screening: Completed  1. Have you been to the ER, urgent care clinic since your last visit? Hospitalized since your last visit? No    2. Have you seen or consulted any other health care providers outside of the [address] [place] [address] since your last visit? Include any pap smears or colon screening. No     Health Maintenance reviewed - .

## 2019-02-04 ENCOUNTER — HOSPITAL ENCOUNTER (OUTPATIENT)
Dept: LAB | Age: 69
Discharge: HOME | End: 2019-02-04
Payer: MEDICARE

## 2019-02-04 LAB
ALBUMIN SERPL-MCNC: 4.3 G/DL (ref 3.4–5)
ALBUMIN/GLOB SERPL: 1.2 {RATIO} (ref 0.8–1.7)
ALP SERPL-CCNC: 85 U/L (ref 45–117)
ALT SERPL-CCNC: 18 U/L (ref 13–56)
ANION GAP SERPL CALC-SCNC: 6 MMOL/L (ref 3–18)
APPEARANCE UR: ABNORMAL
AST SERPL-CCNC: 17 U/L (ref 15–37)
BACTERIA URNS QL MICRO: ABNORMAL /HPF
BASOPHILS # BLD: 0 K/UL (ref 0–0.1)
BASOPHILS NFR BLD: 1 % (ref 0–2)
BILIRUB SERPL-MCNC: 0.5 MG/DL (ref 0.2–1)
BILIRUB UR QL: NEGATIVE
BUN SERPL-MCNC: 16 MG/DL (ref 7–18)
BUN/CREAT SERPL: 16 (ref 12–20)
CALCIUM SERPL-MCNC: 9.1 MG/DL (ref 8.5–10.1)
CHLORIDE SERPL-SCNC: 102 MMOL/L (ref 100–108)
CHOLEST SERPL-MCNC: 120 MG/DL
CO2 SERPL-SCNC: 32 MMOL/L (ref 21–32)
COLOR UR: YELLOW
CREAT SERPL-MCNC: 1 MG/DL (ref 0.6–1.3)
CREAT UR-MCNC: 328 MG/DL (ref 30–125)
DIFFERENTIAL METHOD BLD: ABNORMAL
EOSINOPHIL # BLD: 0.2 K/UL (ref 0–0.4)
EOSINOPHIL NFR BLD: 4 % (ref 0–5)
EPITH CASTS URNS QL MICRO: ABNORMAL /LPF (ref 0–5)
ERYTHROCYTE [DISTWIDTH] IN BLOOD BY AUTOMATED COUNT: 13.7 % (ref 11.6–14.5)
EST. AVERAGE GLUCOSE BLD GHB EST-MCNC: 134 MG/DL
GLOBULIN SER CALC-MCNC: 3.5 G/DL (ref 2–4)
GLUCOSE SERPL-MCNC: 99 MG/DL (ref 74–99)
GLUCOSE UR STRIP.AUTO-MCNC: NEGATIVE MG/DL
HBA1C MFR BLD: 6.3 % (ref 4.2–5.6)
HCT VFR BLD AUTO: 38 % (ref 35–45)
HDLC SERPL-MCNC: 52 MG/DL (ref 40–60)
HDLC SERPL: 2.3 {RATIO} (ref 0–5)
HGB BLD-MCNC: 12.5 G/DL (ref 12–16)
HGB UR QL STRIP: ABNORMAL
KETONES UR QL STRIP.AUTO: NEGATIVE MG/DL
LDLC SERPL CALC-MCNC: 54.4 MG/DL (ref 0–100)
LEUKOCYTE ESTERASE UR QL STRIP.AUTO: ABNORMAL
LIPID PROFILE,FLP: NORMAL
LYMPHOCYTES # BLD: 1.7 K/UL (ref 0.9–3.6)
LYMPHOCYTES NFR BLD: 29 % (ref 21–52)
MCH RBC QN AUTO: 30.1 PG (ref 24–34)
MCHC RBC AUTO-ENTMCNC: 32.9 G/DL (ref 31–37)
MCV RBC AUTO: 91.6 FL (ref 74–97)
MICROALBUMIN UR-MCNC: 4.59 MG/DL (ref 0–3)
MICROALBUMIN/CREAT UR-RTO: 14 MG/G (ref 0–30)
MONOCYTES # BLD: 0.4 K/UL (ref 0.05–1.2)
MONOCYTES NFR BLD: 6 % (ref 3–10)
NEUTS SEG # BLD: 3.7 K/UL (ref 1.8–8)
NEUTS SEG NFR BLD: 60 % (ref 40–73)
NITRITE UR QL STRIP.AUTO: NEGATIVE
PH UR STRIP: 6.5 [PH] (ref 5–8)
PLATELET # BLD AUTO: 264 K/UL (ref 135–420)
PMV BLD AUTO: 9.8 FL (ref 9.2–11.8)
POTASSIUM SERPL-SCNC: 4.1 MMOL/L (ref 3.5–5.5)
PROT SERPL-MCNC: 7.8 G/DL (ref 6.4–8.2)
PROT UR STRIP-MCNC: NEGATIVE MG/DL
RBC # BLD AUTO: 4.15 M/UL (ref 4.2–5.3)
RBC #/AREA URNS HPF: ABNORMAL /HPF (ref 0–5)
SODIUM SERPL-SCNC: 140 MMOL/L (ref 136–145)
SP GR UR REFRACTOMETRY: 1.02 (ref 1–1.03)
TRIGL SERPL-MCNC: 68 MG/DL (ref ?–150)
TSH SERPL DL<=0.05 MIU/L-ACNC: 0.76 UIU/ML (ref 0.36–3.74)
UROBILINOGEN UR QL STRIP.AUTO: 0.2 EU/DL (ref 0.2–1)
VLDLC SERPL CALC-MCNC: 13.6 MG/DL
WBC # BLD AUTO: 6.1 K/UL (ref 4.6–13.2)
WBC URNS QL MICRO: ABNORMAL /HPF (ref 0–4)

## 2019-02-04 PROCEDURE — 80053 COMPREHEN METABOLIC PANEL: CPT

## 2019-02-04 PROCEDURE — 80061 LIPID PANEL: CPT

## 2019-02-04 PROCEDURE — 85025 COMPLETE CBC W/AUTO DIFF WBC: CPT

## 2019-02-04 PROCEDURE — 36415 COLL VENOUS BLD VENIPUNCTURE: CPT

## 2019-02-04 PROCEDURE — 83036 HEMOGLOBIN GLYCOSYLATED A1C: CPT

## 2019-02-04 PROCEDURE — 84443 ASSAY THYROID STIM HORMONE: CPT

## 2019-02-04 PROCEDURE — 82043 UR ALBUMIN QUANTITATIVE: CPT

## 2019-02-04 PROCEDURE — 81001 URINALYSIS AUTO W/SCOPE: CPT

## 2019-02-11 ENCOUNTER — OFFICE VISIT (OUTPATIENT)
Dept: FAMILY MEDICINE CLINIC | Age: 69
End: 2019-02-11

## 2019-02-11 VITALS
BODY MASS INDEX: 31.28 KG/M2 | DIASTOLIC BLOOD PRESSURE: 78 MMHG | SYSTOLIC BLOOD PRESSURE: 146 MMHG | WEIGHT: 170 LBS | HEIGHT: 62 IN | HEART RATE: 68 BPM | TEMPERATURE: 97.5 F | RESPIRATION RATE: 18 BRPM | OXYGEN SATURATION: 98 %

## 2019-02-11 DIAGNOSIS — K21.9 GERD (GASTROESOPHAGEAL REFLUX DISEASE): ICD-10-CM

## 2019-02-11 DIAGNOSIS — J45.909 MILD ASTHMA WITHOUT COMPLICATION, UNSPECIFIED WHETHER PERSISTENT: ICD-10-CM

## 2019-02-11 DIAGNOSIS — I10 HYPERTENSION, UNSPECIFIED TYPE: Primary | ICD-10-CM

## 2019-02-11 DIAGNOSIS — J45.909 ASTHMA: ICD-10-CM

## 2019-02-11 DIAGNOSIS — M05.79 RHEUMATOID ARTHRITIS INVOLVING MULTIPLE SITES WITH POSITIVE RHEUMATOID FACTOR (HCC): ICD-10-CM

## 2019-02-11 DIAGNOSIS — I10 HTN (HYPERTENSION): ICD-10-CM

## 2019-02-11 DIAGNOSIS — Z23 ENCOUNTER FOR IMMUNIZATION: ICD-10-CM

## 2019-02-11 DIAGNOSIS — E78.00 PURE HYPERCHOLESTEROLEMIA: ICD-10-CM

## 2019-02-11 DIAGNOSIS — E11.21 CONTROLLED TYPE 2 DIABETES MELLITUS WITH DIABETIC NEPHROPATHY, WITHOUT LONG-TERM CURRENT USE OF INSULIN (HCC): ICD-10-CM

## 2019-02-11 DIAGNOSIS — E11.21 TYPE 2 DIABETES MELLITUS WITH NEPHROPATHY (HCC): ICD-10-CM

## 2019-02-11 DIAGNOSIS — K21.00 GASTROESOPHAGEAL REFLUX DISEASE WITH ESOPHAGITIS: ICD-10-CM

## 2019-02-11 DIAGNOSIS — I10 ESSENTIAL HYPERTENSION: ICD-10-CM

## 2019-02-11 DIAGNOSIS — Z00.00 ROUTINE GENERAL MEDICAL EXAMINATION AT A HEALTH CARE FACILITY: ICD-10-CM

## 2019-02-11 DIAGNOSIS — J43.9 PULMONARY EMPHYSEMA, UNSPECIFIED EMPHYSEMA TYPE (HCC): ICD-10-CM

## 2019-02-11 DIAGNOSIS — M06.9 RHEUMATOID ARTHRITIS, INVOLVING UNSPECIFIED SITE, UNSPECIFIED RHEUMATOID FACTOR PRESENCE: ICD-10-CM

## 2019-02-11 RX ORDER — BUDESONIDE AND FORMOTEROL FUMARATE DIHYDRATE 160; 4.5 UG/1; UG/1
2 AEROSOL RESPIRATORY (INHALATION) 2 TIMES DAILY
Qty: 3 INHALER | Refills: 12 | Status: SHIPPED | OUTPATIENT
Start: 2019-02-11 | End: 2020-04-03

## 2019-02-11 RX ORDER — LOSARTAN POTASSIUM 50 MG/1
50 TABLET ORAL DAILY
Qty: 90 TAB | Refills: 4 | Status: SHIPPED | OUTPATIENT
Start: 2019-02-11 | End: 2019-11-05 | Stop reason: SDUPTHER

## 2019-02-11 RX ORDER — HYDROCHLOROTHIAZIDE 25 MG/1
25 TABLET ORAL DAILY
Qty: 90 TAB | Refills: 4 | Status: SHIPPED | OUTPATIENT
Start: 2019-02-11 | End: 2019-11-05 | Stop reason: SDUPTHER

## 2019-02-11 RX ORDER — SIMVASTATIN 20 MG/1
20 TABLET, FILM COATED ORAL
Qty: 90 TAB | Refills: 4 | Status: SHIPPED | OUTPATIENT
Start: 2019-02-11 | End: 2019-11-05 | Stop reason: SDUPTHER

## 2019-02-11 RX ORDER — SIMVASTATIN 40 MG/1
40 TABLET, FILM COATED ORAL
Qty: 90 TAB | Refills: 4 | Status: SHIPPED | OUTPATIENT
Start: 2019-02-11 | End: 2019-02-11

## 2019-02-11 RX ORDER — OMEPRAZOLE 40 MG/1
40 CAPSULE, DELAYED RELEASE ORAL 2 TIMES DAILY
Qty: 180 CAP | Refills: 4 | Status: SHIPPED | OUTPATIENT
Start: 2019-02-11 | End: 2019-11-05 | Stop reason: SDUPTHER

## 2019-02-11 RX ORDER — ALBUTEROL SULFATE 90 UG/1
2 AEROSOL, METERED RESPIRATORY (INHALATION)
Qty: 1 INHALER | Refills: 12 | Status: SHIPPED | OUTPATIENT
Start: 2019-02-11 | End: 2020-04-03

## 2019-02-11 NOTE — PROGRESS NOTES
vis given and patient consent obtained patient observed in room for allergic reaction (2 nurse verification

## 2019-02-11 NOTE — PROGRESS NOTES
1. Have you been to the ER, urgent care clinic since your last visit? Hospitalized since your last visit? No    2. Have you seen or consulted any other health care providers outside of the [address] [place] [address] since your last visit? Include any pap smears or colon screening.  No

## 2019-02-11 NOTE — PROGRESS NOTES
THE SUBSEQUENT MEDICARE ANNUAL WELLNESS VISIT PROGRESS NOTES    This is a Subsequent Medicare Annual Wellness Visit providing Personalized Prevention Plan Services (PPPS) (Performed 12 months after initial AWV and PPPS )    I have reviewed the patient's medical history in detail and updated the computerized patient record. Aicha Perdomo is a 76 y.o.  female and presents for an subsequent annual wellness exam     Patient Active Problem List    Diagnosis Date Noted    Pulmonary emphysema ([facility] 03/08/2018    Type 2 diabetes mellitus with nephropathy ([facility] 01/23/2018    Advance directive in chart 07/11/2016    Pure hypercholesterolemia 10/24/2011    GERD (gastroesophageal reflux disease) 04/29/2011    HTN (hypertension) 04/29/2011    Rheumatoid arthritis involving multiple sites with positive rheumatoid factor ([facility] 04/29/2011     Current Outpatient Medications   Medication Sig Dispense Refill    albuterol (PROVENTIL HFA, VENTOLIN HFA, PROAIR HFA) 90 mcg/actuation inhaler Take 2 Puffs by inhalation every six (6) hours as needed for Wheezing or Shortness of Breath. 1 Inhaler 12    umeclidinium (INCRUSE ELLIPTA) 62.5 mcg/actuation inhaler Take 1 Puff by inhalation daily. 3 Inhaler 12    hydroCHLOROthiazide (HYDRODIURIL) 25 mg tablet Take 1 Tab by mouth daily. 90 Tab 4    losartan (COZAAR) 50 mg tablet Take 1 Tab by mouth daily. 90 Tab 4    linagliptin (TRADJENTA) 5 mg tablet Take 1 Tab by mouth daily. 90 Tab 4    budesonide-formoterol (SYMBICORT) 160-4.5 mcg/actuation HFAA Take 2 Puffs by inhalation two (2) times a day. 3 Inhaler 12    omeprazole (PRILOSEC) 40 mg capsule Take 1 Cap by mouth two (2) times a day. 180 Cap 4    simvastatin (ZOCOR) 20 mg tablet Take 1 Tab by mouth nightly.  90 Tab 4     Allergies   Allergen Reactions    Percocet [Oxycodone-Acetaminophen] Nausea and Vomiting    Ultram [Tramadol] Swelling     Past Medical History:   Diagnosis Date    Advance directive in chart 7/11/2016    Asthma 12/2/2013    GERD (gastroesophageal reflux disease) 4/29/2011    HTN (hypertension) 4/29/2011    Rheumatoid arthritis(714.0) 4/29/2011    Type II or unspecified type diabetes mellitus without mention of complication, not stated as uncontrolled 4/29/2011     Past Surgical History:   Procedure Laterality Date    HX BREAST BIOPSY Right 1970's    rt benign    HX HYSTERECTOMY      HX NARCISO AND BSO  2004     Family History   Problem Relation Age of Onset    Hypertension Mother    [facility] Arthritis-rheumatoid Mother     Stroke Father      Social History     Tobacco Use    Smoking status: Never Smoker    Smokeless tobacco: Never Used   Substance Use Topics    Alcohol use: No         ROS       All other systems reviewed and are negative. History     Past Medical History:   Diagnosis Date    Advance directive in chart 7/11/2016    Asthma 12/2/2013    GERD (gastroesophageal reflux disease) 4/29/2011    HTN (hypertension) 4/29/2011    Rheumatoid arthritis(714.0) 4/29/2011    Type II or unspecified type diabetes mellitus without mention of complication, not stated as uncontrolled 4/29/2011      Past Surgical History:   Procedure Laterality Date    HX BREAST BIOPSY Right 1970's    rt benign    HX HYSTERECTOMY      HX NARCISO AND BSO  2004     Current Outpatient Medications   Medication Sig Dispense Refill    albuterol (PROVENTIL HFA, VENTOLIN HFA, PROAIR HFA) 90 mcg/actuation inhaler Take 2 Puffs by inhalation every six (6) hours as needed for Wheezing or Shortness of Breath. 1 Inhaler 12    umeclidinium (INCRUSE ELLIPTA) 62.5 mcg/actuation inhaler Take 1 Puff by inhalation daily. 3 Inhaler 12    hydroCHLOROthiazide (HYDRODIURIL) 25 mg tablet Take 1 Tab by mouth daily. 90 Tab 4    losartan (COZAAR) 50 mg tablet Take 1 Tab by mouth daily. 90 Tab 4    simvastatin (ZOCOR) 40 mg tablet Take 1 Tab by mouth nightly. 90 Tab 4    linagliptin (TRADJENTA) 5 mg tablet Take 1 Tab by mouth daily. 90 Tab 4    budesonide-formoterol (SYMBICORT) 160-4.5 mcg/actuation HFAA Take 2 Puffs by inhalation two (2) times a day. 3 Inhaler 12    omeprazole (PRILOSEC) 40 mg capsule Take 1 Cap by mouth two (2) times a day. 180 Cap 4     Allergies   Allergen Reactions    Percocet [Oxycodone-Acetaminophen] Nausea and Vomiting    Ultram [Tramadol] Swelling     Family History   Problem Relation Age of Onset    Hypertension Mother    [facility] Arthritis-rheumatoid Mother     Stroke Father      Social History     Tobacco Use    Smoking status: Never Smoker    Smokeless tobacco: Never Used   Substance Use Topics    Alcohol use: No     Patient Active Problem List   Diagnosis Code    GERD (gastroesophageal reflux disease) K21.9    HTN (hypertension) I10    Rheumatoid arthritis involving multiple sites with positive rheumatoid factor (HCC) M05.79    Pure hypercholesterolemia E78.00    Advance directive in chart Z78.9    Type 2 diabetes mellitus with nephropathy (HCC) E11.21    Pulmonary emphysema ([address] J43.9       Health Maintenance History  Immunizations reviewed, dtap utd  , pneumovax utd , flu today , zoster na   Colonoscopy:utd , AAA screening  Na  (male over 72 smoker)  Chest CT : na ,  Eye exam: utd   Mammo utd   Dexascan utd     Health Care Directive or Living Will: yes    Depression Risk Factor Screening:      Patient Health Questionnaire (PHQ-2)   Over the last 2 weeks, how often have you been bothered by any of the following problems? · Little interest or pleasure in doing things? · Not at all. [0]  · Feeling down, depressed, or hopeless? · Not at all. [0]    Total Score: 0/6  PHQ-2 Assessment Scoring:   A score of 2 or more requires further screening with the PHQ-9    Alcohol Risk Factor Screening:     Women: On any occasion during the past 3 months, have you had more than 3 drinks containing alcohol? Do you average more than 7 drinks per week?   Men: On any occasion during the past 3 months, have you had more than 4 drinks containing alcohol? Do you average more than 14 drinks per week? Functional Ability and Level of Safety:     Hearing Loss    Hearing is good. Activities of Daily Living   Self-care. Requires assistance with: no ADLs    Fall Risk   No fall risk factors    Abuse Screen   None      Examination   Physical Examination  Vitals:    02/11/19 0952   BP: 146/78   Pulse: 68   Resp: 18   Temp: 97.5 °F (36.4 °C)   TempSrc: Oral   SpO2: 98%   Weight: 170 lb (77.1 kg)   Height: 5' 2\" (1.575 m)   PainSc:   0 - No pain     Body mass index is 31.09 kg/m². Evaluation of Cognitive Function:  Mood/affect:appropriate   Appearance: well groomed   Family member/caregiver input: na     alert, well appearing, and in no distress, oriented to person, place, and time and normal appearing weight    Patient Care Team:  Kev Trujillo., DO as PCP - General (Family Practice)  Dina Dawson MD (Rheumatology)  Melvin Rodriguez MD (Internal Medicine)  Issa Sherman MD (Gastroenterology)  Eli Byrd MD (Neurology)    Advice/Referrals/Counseling/Plan:   Education and counseling provided:  Are appropriate based on today's review and evaluation  Include in education list (weight loss, physical activity, smoking cessation, fall prevention, and nutrition)  current treatment plan is effective, no change in therapy. I have discussed the diagnosis with the patient and the intended plan as seen in the above orders. The patient has received an after-visit summary and questions were answered concerning future plans. I have discussed medication side effects and warnings with the patient as well. I have reviewed the plan of care with the patient, accepted their input and they are in agreement with the treatment goals.          Follow-up Disposition: Not on File    _____________________________________________________________    Problem Assessment    for treatment of   Chief Complaint   Patient presents with   [facility] Diabetes    Arthritis    COPD    Hypertension    Cholesterol Problem    GERD         SUBJECTIVE      Cardiovascular Review:  The patient has diabetes, hypertension and hyperlipidemia. Diet and Lifestyle: not attempting to follow a low fat, low cholesterol diet  Home BP Monitoring: is not measured at home. Pertinent ROS: taking medications as instructed, no medication side effects noted, no TIA's, no chest pain on exertion, no dyspnea on exertion, no swelling of ankles. Diabetes Mellitus:  She has diabetes mellitus, and  diabetes, hypertension and hyperlipidemia. Diabetic ROS - diabetic diet compliance: compliant most of the time. Lab review: labs are reviewed, up to date and normal.   COPD Review:  The patient is being seen for follow up of COPD. Oxygen: She currently is not on home oxygen therapy. Symptoms: symptoms worse at night. Patient uses 1 pillows at night. Patient does not smoke cigarettes. Osteoarthritis and Chronic Pain:  Patient has rheumatoid arthritis, primarily affecting the diffuse. Symptoms onset: problem is longstanding. Rheumatological ROS: no current joint or muscle symptoms, essentially pain-free. Response to treatment plan: stable. gerd ongoing having more problems not taking pher prilosec      Additional Concerns:        Visit Vitals  /78 (BP 1 Location: Right arm, BP Patient Position: Sitting)   Pulse 68   Temp 97.5 °F (36.4 °C) (Oral)   Resp 18   Ht 5' 2\" (1.575 m)   Wt 170 lb (77.1 kg)   SpO2 98%   BMI 31.09 kg/m²     General:  Alert, cooperative, no distress, appears stated age. Head:  Normocephalic, without obvious abnormality, atraumatic. Eyes:  Conjunctivae/corneas clear. PERRL, EOMs intact. Fundi benign. Ears:  Normal TMs and external ear canals both ears. Nose: Nares normal. Septum midline. Mucosa normal. No drainage or sinus tenderness.    Throat: Lips, mucosa, and tongue normal. Teeth and gums normal.   Neck: Supple, symmetrical, trachea midline, no adenopathy, thyroid: no enlargement/tenderness/nodules, no carotid bruit and no JVD. Back:   Symmetric, no curvature. ROM normal. No CVA tenderness. Lungs:   Clear to auscultation bilaterally. Chest wall:  No tenderness or deformity. Heart:  Regular rate and rhythm, S1, S2 normal, no murmur, click, rub or gallop. Breast Exam:  No tenderness, masses, or nipple abnormality. Abdomen:   Soft, non-tender. Bowel sounds normal. No masses,  No organomegaly. Genitalia:  Normal female without lesion, discharge or tenderness. Rectal:  Normal tone,  no masses or tenderness  Guaiac negative stool. Extremities: Extremities normal, atraumatic, no cyanosis or edema. Pulses: 2+ and symmetric all extremities. Skin: Skin color, texture, turgor normal. No rashes or lesions. Lymph nodes: Cervical, supraclavicular, and axillary nodes normal.   Neurologic: CNII-XII intact. Normal strength, sensation and reflexes throughout. LABS      Component      Latest Ref Rng & Units 2/4/2019 2/4/2019 2/4/2019 2/4/2019           9:43 AM  9:43 AM  9:43 AM  9:43 AM   WBC      4.6 - 13.2 K/uL       RBC      4.20 - 5.30 M/uL       HGB      12.0 - 16.0 g/dL       HCT      35.0 - 45.0 %       MCV      74.0 - 97.0 FL       MCH      24.0 - 34.0 PG       MCHC      31.0 - 37.0 g/dL       RDW      11.6 - 14.5 %       PLATELET      000 - 432 K/uL       MPV      9.2 - 11.8 FL       NEUTROPHILS      40 - 73 %       LYMPHOCYTES      21 - 52 %       MONOCYTES      3 - 10 %       EOSINOPHILS      0 - 5 %       BASOPHILS      0 - 2 %       ABS. NEUTROPHILS      1.8 - 8.0 K/UL       ABS. LYMPHOCYTES      0.9 - 3.6 K/UL       ABS. MONOCYTES      0.05 - 1.2 K/UL       ABS. EOSINOPHILS      0.0 - 0.4 K/UL       ABS.  BASOPHILS      0.0 - 0.1 K/UL       DF             Sodium      136 - 145 mmol/L   140    Potassium      3.5 - 5.5 mmol/L   4.1    Chloride      100 - 108 mmol/L   102    CO2      21 - 32 mmol/L   32    Anion gap 3.0 - 18 mmol/L   6    Glucose      74 - 99 mg/dL   99    BUN      7.0 - 18 MG/DL   16    Creatinine      0.6 - 1.3 MG/DL   1.00    BUN/Creatinine ratio      12 - 20     16    GFR est AA      >60 ml/min/1.73m2   >60    GFR est non-AA      >60 ml/min/1.73m2   55 (L)    Calcium      8.5 - 10.1 MG/DL   9.1    Bilirubin, total      0.2 - 1.0 MG/DL   0.5    ALT (SGPT)      13 - 56 U/L   18    AST      15 - 37 U/L   17    Alk. phosphatase      45 - 117 U/L   85    Protein, total      6.4 - 8.2 g/dL   7.8    Albumin      3.4 - 5.0 g/dL   4.3    Globulin      2.0 - 4.0 g/dL   3.5    A-G Ratio      0.8 - 1.7     1.2    Color             Appearance             Specific gravity      1.005 - 1.030         pH (UA)      5.0 - 8.0         Protein      NEG mg/dL       Glucose      NEG mg/dL       Ketone      NEG mg/dL       Bilirubin      NEG         Blood      NEG         Urobilinogen      0.2 - 1.0 EU/dL       Nitrites      NEG         Leukocyte Esterase      NEG         Cholesterol, total      <200 MG/DL    120   Triglyceride      <150 MG/DL    68   HDL Cholesterol      40 - 60 MG/DL    52   LDL, calculated      0 - 100 MG/DL    54.4   VLDL, calculated      MG/DL    13.6   CHOL/HDL Ratio      0 - 5.0      2.3   WBC      0 - 4 /hpf       RBC      0 - 5 /hpf       Epithelial cells      0 - 5 /lpf       Bacteria      NEG /hpf       Microalbumin,urine random      0 - 3.0 MG/DL 4.59 (H)      Creatinine, urine      30 - 125 mg/dL 328.00 (H)      Microalbumin/Creat.  Ratio      0 - 30 mg/g 14      Hemoglobin A1c, (calculated)      4.2 - 5.6 %       Est. average glucose      mg/dL       TSH      0.36 - 3.74 uIU/mL  0.76       Component      Latest Ref Rng & Units 2/4/2019 2/4/2019 2/4/2019           9:43 AM  9:43 AM  9:43 AM   WBC      4.6 - 13.2 K/uL      RBC      4.20 - 5.30 M/uL      HGB      12.0 - 16.0 g/dL      HCT      35.0 - 45.0 %      MCV      74.0 - 97.0 FL      MCH      24.0 - 34.0 PG      MCHC      31.0 - 37.0 g/dL RDW      11.6 - 14.5 %      PLATELET      112 - 205 K/uL      MPV      9.2 - 11.8 FL      NEUTROPHILS      40 - 73 %      LYMPHOCYTES      21 - 52 %      MONOCYTES      3 - 10 %      EOSINOPHILS      0 - 5 %      BASOPHILS      0 - 2 %      ABS. NEUTROPHILS      1.8 - 8.0 K/UL      ABS. LYMPHOCYTES      0.9 - 3.6 K/UL      ABS. MONOCYTES      0.05 - 1.2 K/UL      ABS. EOSINOPHILS      0.0 - 0.4 K/UL      ABS. BASOPHILS      0.0 - 0.1 K/UL      DF            Sodium      136 - 145 mmol/L      Potassium      3.5 - 5.5 mmol/L      Chloride      100 - 108 mmol/L      CO2      21 - 32 mmol/L      Anion gap      3.0 - 18 mmol/L      Glucose      74 - 99 mg/dL      BUN      7.0 - 18 MG/DL      Creatinine      0.6 - 1.3 MG/DL      BUN/Creatinine ratio      12 - 20        GFR est AA      >60 ml/min/1.73m2      GFR est non-AA      >60 ml/min/1.73m2      Calcium      8.5 - 10.1 MG/DL      Bilirubin, total      0.2 - 1.0 MG/DL      ALT (SGPT)      13 - 56 U/L      AST      15 - 37 U/L      Alk.  phosphatase      45 - 117 U/L      Protein, total      6.4 - 8.2 g/dL      Albumin      3.4 - 5.0 g/dL      Globulin      2.0 - 4.0 g/dL      A-G Ratio      0.8 - 1.7        Color        YELLOW    Appearance        CLOUDY    Specific gravity      1.005 - 1.030    1.024    pH (UA)      5.0 - 8.0    6.5    Protein      NEG mg/dL  NEGATIVE    Glucose      NEG mg/dL  NEGATIVE    Ketone      NEG mg/dL  NEGATIVE    Bilirubin      NEG    NEGATIVE    Blood      NEG    TRACE (A)    Urobilinogen      0.2 - 1.0 EU/dL  0.2    Nitrites      NEG    NEGATIVE    Leukocyte Esterase      NEG    MODERATE (A)    Cholesterol, total      <200 MG/DL      Triglyceride      <150 MG/DL      HDL Cholesterol      40 - 60 MG/DL      LDL, calculated      0 - 100 MG/DL      VLDL, calculated      MG/DL      CHOL/HDL Ratio      0 - 5.0        WBC      0 - 4 /hpf 8 to 12     RBC      0 - 5 /hpf 2 to 3     Epithelial cells      0 - 5 /lpf FEW     Bacteria      NEG /hpf 3+ (A)     Microalbumin,urine random      0 - 3.0 MG/DL      Creatinine, urine      30 - 125 mg/dL      Microalbumin/Creat. Ratio      0 - 30 mg/g      Hemoglobin A1c, (calculated)      4.2 - 5.6 %   6.3 (H)   Est. average glucose      mg/dL   134   TSH      0.36 - 3.74 uIU/mL        Component      Latest Ref Rng & Units 2/4/2019           9:43 AM   WBC      4.6 - 13.2 K/uL 6.1   RBC      4.20 - 5.30 M/uL 4.15 (L)   HGB      12.0 - 16.0 g/dL 12.5   HCT      35.0 - 45.0 % 38.0   MCV      74.0 - 97.0 FL 91.6   MCH      24.0 - 34.0 PG 30.1   MCHC      31.0 - 37.0 g/dL 32.9   RDW      11.6 - 14.5 % 13.7   PLATELET      423 - 671 K/uL 264   MPV      9.2 - 11.8 FL 9.8   NEUTROPHILS      40 - 73 % 60   LYMPHOCYTES      21 - 52 % 29   MONOCYTES      3 - 10 % 6   EOSINOPHILS      0 - 5 % 4   BASOPHILS      0 - 2 % 1   ABS. NEUTROPHILS      1.8 - 8.0 K/UL 3.7   ABS. LYMPHOCYTES      0.9 - 3.6 K/UL 1.7   ABS. MONOCYTES      0.05 - 1.2 K/UL 0.4   ABS. EOSINOPHILS      0.0 - 0.4 K/UL 0.2   ABS. BASOPHILS      0.0 - 0.1 K/UL 0.0   DF       AUTOMATED   Sodium      136 - 145 mmol/L    Potassium      3.5 - 5.5 mmol/L    Chloride      100 - 108 mmol/L    CO2      21 - 32 mmol/L    Anion gap      3.0 - 18 mmol/L    Glucose      74 - 99 mg/dL    BUN      7.0 - 18 MG/DL    Creatinine      0.6 - 1.3 MG/DL    BUN/Creatinine ratio      12 - 20      GFR est AA      >60 ml/min/1.73m2    GFR est non-AA      >60 ml/min/1.73m2    Calcium      8.5 - 10.1 MG/DL    Bilirubin, total      0.2 - 1.0 MG/DL    ALT (SGPT)      13 - 56 U/L    AST      15 - 37 U/L    Alk.  phosphatase      45 - 117 U/L    Protein, total      6.4 - 8.2 g/dL    Albumin      3.4 - 5.0 g/dL    Globulin      2.0 - 4.0 g/dL    A-G Ratio      0.8 - 1.7      Color          Appearance          Specific gravity      1.005 - 1.030      pH (UA)      5.0 - 8.0      Protein      NEG mg/dL    Glucose      NEG mg/dL    Ketone      NEG mg/dL    Bilirubin      NEG      Blood      NEG Urobilinogen      0.2 - 1.0 EU/dL    Nitrites      NEG      Leukocyte Esterase      NEG      Cholesterol, total      <200 MG/DL    Triglyceride      <150 MG/DL    HDL Cholesterol      40 - 60 MG/DL    LDL, calculated      0 - 100 MG/DL    VLDL, calculated      MG/DL    CHOL/HDL Ratio      0 - 5.0      WBC      0 - 4 /hpf    RBC      0 - 5 /hpf    Epithelial cells      0 - 5 /lpf    Bacteria      NEG /hpf    Microalbumin,urine random      0 - 3.0 MG/DL    Creatinine, urine      30 - 125 mg/dL    Microalbumin/Creat. Ratio      0 - 30 mg/g    Hemoglobin A1c, (calculated)      4.2 - 5.6 %    Est. average glucose      mg/dL    TSH      0.36 - 3.74 uIU/mL      TESTS  ejkg  nsr      Assessment/Plan:      Diabetes - well controlled, improved  Hypertension - well controlled, stable  Hyperlipidemia - over treated will decrase zocor to 20/d and recheck in 3 mo  gerd restart prilosec  Copd stable  Rheumatoid arthritsi stble  Flu shot today     Diagnoses and all orders for this visit:    1. Hypertension, unspecified type  -     AMB POC EKG ROUTINE W/ 12 LEADS, INTER & REP    2. Pulmonary emphysema, unspecified emphysema type (HCC)  -     umeclidinium (INCRUSE ELLIPTA) 62.5 mcg/actuation inhaler; Take 1 Puff by inhalation daily. 3. Gastroesophageal reflux disease with esophagitis  -     hydroCHLOROthiazide (HYDRODIURIL) 25 mg tablet; Take 1 Tab by mouth daily. -     losartan (COZAAR) 50 mg tablet; Take 1 Tab by mouth daily. -     simvastatin (ZOCOR) 40 mg tablet; Take 1 Tab by mouth nightly. -     linagliptin (TRADJENTA) 5 mg tablet; Take 1 Tab by mouth daily. -     budesonide-formoterol (SYMBICORT) 160-4.5 mcg/actuation HFAA; Take 2 Puffs by inhalation two (2) times a day. -     omeprazole (PRILOSEC) 40 mg capsule; Take 1 Cap by mouth two (2) times a day. 4. Essential hypertension  -     hydroCHLOROthiazide (HYDRODIURIL) 25 mg tablet; Take 1 Tab by mouth daily. -     losartan (COZAAR) 50 mg tablet;  Take 1 Tab by mouth daily. -     simvastatin (ZOCOR) 40 mg tablet; Take 1 Tab by mouth nightly. -     linagliptin (TRADJENTA) 5 mg tablet; Take 1 Tab by mouth daily. -     budesonide-formoterol (SYMBICORT) 160-4.5 mcg/actuation HFAA; Take 2 Puffs by inhalation two (2) times a day. 5. Controlled type 2 diabetes mellitus with diabetic nephropathy, without long-term current use of insulin (HCC)  -     hydroCHLOROthiazide (HYDRODIURIL) 25 mg tablet; Take 1 Tab by mouth daily. -     losartan (COZAAR) 50 mg tablet; Take 1 Tab by mouth daily. -     simvastatin (ZOCOR) 40 mg tablet; Take 1 Tab by mouth nightly. -     linagliptin (TRADJENTA) 5 mg tablet; Take 1 Tab by mouth daily. -     budesonide-formoterol (SYMBICORT) 160-4.5 mcg/actuation HFAA; Take 2 Puffs by inhalation two (2) times a day. 6. Rheumatoid arthritis involving multiple sites with positive rheumatoid factor (HCC)  -     hydroCHLOROthiazide (HYDRODIURIL) 25 mg tablet; Take 1 Tab by mouth daily. -     losartan (COZAAR) 50 mg tablet; Take 1 Tab by mouth daily. -     simvastatin (ZOCOR) 40 mg tablet; Take 1 Tab by mouth nightly. -     linagliptin (TRADJENTA) 5 mg tablet; Take 1 Tab by mouth daily. -     budesonide-formoterol (SYMBICORT) 160-4.5 mcg/actuation HFAA; Take 2 Puffs by inhalation two (2) times a day. 7. Pure hypercholesterolemia  -     hydroCHLOROthiazide (HYDRODIURIL) 25 mg tablet; Take 1 Tab by mouth daily. -     losartan (COZAAR) 50 mg tablet; Take 1 Tab by mouth daily. -     simvastatin (ZOCOR) 40 mg tablet; Take 1 Tab by mouth nightly. -     linagliptin (TRADJENTA) 5 mg tablet; Take 1 Tab by mouth daily. -     budesonide-formoterol (SYMBICORT) 160-4.5 mcg/actuation HFAA; Take 2 Puffs by inhalation two (2) times a day. -     omeprazole (PRILOSEC) 40 mg capsule; Take 1 Cap by mouth two (2) times a day. 8. Mild asthma without complication, unspecified whether persistent  -     hydroCHLOROthiazide (HYDRODIURIL) 25 mg tablet;  Take 1 Tab by mouth daily. -     losartan (COZAAR) 50 mg tablet; Take 1 Tab by mouth daily. -     simvastatin (ZOCOR) 40 mg tablet; Take 1 Tab by mouth nightly. -     linagliptin (TRADJENTA) 5 mg tablet; Take 1 Tab by mouth daily. -     budesonide-formoterol (SYMBICORT) 160-4.5 mcg/actuation HFAA; Take 2 Puffs by inhalation two (2) times a day. 9. Routine general medical examination at a health care facility  -     hydroCHLOROthiazide (HYDRODIURIL) 25 mg tablet; Take 1 Tab by mouth daily. -     losartan (COZAAR) 50 mg tablet; Take 1 Tab by mouth daily. -     simvastatin (ZOCOR) 40 mg tablet; Take 1 Tab by mouth nightly. -     linagliptin (TRADJENTA) 5 mg tablet; Take 1 Tab by mouth daily. -     budesonide-formoterol (SYMBICORT) 160-4.5 mcg/actuation HFAA; Take 2 Puffs by inhalation two (2) times a day. 10. Type 2 diabetes mellitus with nephropathy (HCC)  -     hydroCHLOROthiazide (HYDRODIURIL) 25 mg tablet; Take 1 Tab by mouth daily. -     losartan (COZAAR) 50 mg tablet; Take 1 Tab by mouth daily. -     simvastatin (ZOCOR) 40 mg tablet; Take 1 Tab by mouth nightly. -     linagliptin (TRADJENTA) 5 mg tablet; Take 1 Tab by mouth daily. -     budesonide-formoterol (SYMBICORT) 160-4.5 mcg/actuation HFAA; Take 2 Puffs by inhalation two (2) times a day. 11. Encounter for immunization  -     hydroCHLOROthiazide (HYDRODIURIL) 25 mg tablet; Take 1 Tab by mouth daily. -     losartan (COZAAR) 50 mg tablet; Take 1 Tab by mouth daily. -     simvastatin (ZOCOR) 40 mg tablet; Take 1 Tab by mouth nightly. -     linagliptin (TRADJENTA) 5 mg tablet; Take 1 Tab by mouth daily. -     budesonide-formoterol (SYMBICORT) 160-4.5 mcg/actuation HFAA; Take 2 Puffs by inhalation two (2) times a day. 12. HTN (hypertension)  -     omeprazole (PRILOSEC) 40 mg capsule; Take 1 Cap by mouth two (2) times a day. 13. GERD (gastroesophageal reflux disease)  -     omeprazole (PRILOSEC) 40 mg capsule;  Take 1 Cap by mouth two (2) times a day.    14. Rheumatoid arthritis, involving unspecified site, unspecified rheumatoid factor presence (HCC)  -     omeprazole (PRILOSEC) 40 mg capsule; Take 1 Cap by mouth two (2) times a day. 15. Asthma  -     omeprazole (PRILOSEC) 40 mg capsule; Take 1 Cap by mouth two (2) times a day. Other orders  -     albuterol (PROVENTIL HFA, VENTOLIN HFA, PROAIR HFA) 90 mcg/actuation inhaler; Take 2 Puffs by inhalation every six (6) hours as needed for Wheezing or Shortness of Breath.           Lab review: labs are reviewed, up to date and normal

## 2019-04-05 ENCOUNTER — TELEPHONE (OUTPATIENT)
Dept: FAMILY MEDICINE CLINIC | Age: 69
End: 2019-04-05

## 2019-04-05 NOTE — TELEPHONE ENCOUNTER
Pt called in and stated that Dr. Shayna Bello will be getting a call from [place] [id number] to verify that she is a diabetic. She just wanted to let Dr. Shayna Bello and his nurses know. The number she had for them was [phone].

## 2019-04-22 ENCOUNTER — HOSPITAL ENCOUNTER (OUTPATIENT)
Dept: MAMMOGRAPHY | Age: 69
Discharge: HOME | End: 2019-04-22
Attending: FAMILY MEDICINE
Payer: MEDICARE

## 2019-04-22 DIAGNOSIS — Z12.31 VISIT FOR SCREENING MAMMOGRAM: ICD-10-CM

## 2019-04-22 PROCEDURE — 77063 BREAST TOMOSYNTHESIS BI: CPT

## 2019-05-06 ENCOUNTER — OFFICE VISIT (OUTPATIENT)
Dept: FAMILY MEDICINE CLINIC | Age: 69
End: 2019-05-06

## 2019-05-06 VITALS
TEMPERATURE: 98.2 F | HEART RATE: 63 BPM | WEIGHT: 169.8 LBS | DIASTOLIC BLOOD PRESSURE: 69 MMHG | HEIGHT: 62 IN | RESPIRATION RATE: 18 BRPM | OXYGEN SATURATION: 99 % | BODY MASS INDEX: 31.25 KG/M2 | SYSTOLIC BLOOD PRESSURE: 138 MMHG

## 2019-05-06 DIAGNOSIS — M05.79 RHEUMATOID ARTHRITIS INVOLVING MULTIPLE SITES WITH POSITIVE RHEUMATOID FACTOR (HCC): ICD-10-CM

## 2019-05-06 DIAGNOSIS — I10 ESSENTIAL HYPERTENSION: ICD-10-CM

## 2019-05-06 DIAGNOSIS — K21.00 GASTROESOPHAGEAL REFLUX DISEASE WITH ESOPHAGITIS: ICD-10-CM

## 2019-05-06 DIAGNOSIS — I10 HYPERTENSION, UNSPECIFIED TYPE: ICD-10-CM

## 2019-05-06 DIAGNOSIS — J45.909 MILD ASTHMA WITHOUT COMPLICATION, UNSPECIFIED WHETHER PERSISTENT: ICD-10-CM

## 2019-05-06 DIAGNOSIS — E78.00 PURE HYPERCHOLESTEROLEMIA: ICD-10-CM

## 2019-05-06 DIAGNOSIS — E11.21 CONTROLLED TYPE 2 DIABETES MELLITUS WITH DIABETIC NEPHROPATHY, WITHOUT LONG-TERM CURRENT USE OF INSULIN (HCC): ICD-10-CM

## 2019-05-06 DIAGNOSIS — E11.21 TYPE 2 DIABETES MELLITUS WITH NEPHROPATHY (HCC): Primary | ICD-10-CM

## 2019-05-06 DIAGNOSIS — J43.9 PULMONARY EMPHYSEMA, UNSPECIFIED EMPHYSEMA TYPE (HCC): ICD-10-CM

## 2019-05-06 NOTE — PROGRESS NOTES
Room #      SUBJECTIVE:    Noam Kendall is a 76 y.o. female who presents today for follow up for diabetes and c/o left toe pain (small toe) patient also would like information on COPD vs ASTHMA    1. Have you been to the ER, urgent care clinic since your last visit? Hospitalized since your last visit? NO    2. Have you seen or consulted any other health care providers outside of the [address] [place] [address] since your last visit? Include any pap smears or colon screening. NO  When :  Reason:    Health Maintenance reviewed Yes    Health Maintenance Due   Topic Date Due    Shingrix Vaccine Age 49> (1 of 2) 08/23/2000    Pneumococcal 65+ years (2 of 2 - PPSV23) 10/24/2016    FOOT EXAM Q1  01/23/2019

## 2019-05-06 NOTE — PROGRESS NOTES
Carmela Escobar is a 76 y.o.  female and presents with    Chief Complaint   Patient presents with    Arthritis    Diabetes    COPD    Hypertension    Cholesterol Problem    GERD           Subjective:  Having some pain in her left 5th toe     No longer seeing ame about RA    No longer seeing [facility] about copd      Cardiovascular Review:  The patient has hypertension and hyperlipidemia. Diet and Lifestyle: not attempting to follow a low fat, low cholesterol diet  Home BP Monitoring: is not measured at home. Pertinent ROS: taking medications as instructed, no medication side effects noted, no TIA's, no chest pain on exertion, no dyspnea on exertion, no swelling of ankles. gerd stable    Additional Concerns:          Patient Active Problem List    Diagnosis Date Noted    Pulmonary emphysema ([facility] [address] 03/08/2018    Type 2 diabetes mellitus with nephropathy ([facility] [address] 01/23/2018    Advance directive in chart 07/11/2016    Pure hypercholesterolemia 10/24/2011    GERD (gastroesophageal reflux disease) 04/29/2011    HTN (hypertension) 04/29/2011    Rheumatoid arthritis involving multiple sites with positive rheumatoid factor ([facility] [address] 04/29/2011     Current Outpatient Medications   Medication Sig Dispense Refill    albuterol (PROVENTIL HFA, VENTOLIN HFA, PROAIR HFA) 90 mcg/actuation inhaler Take 2 Puffs by inhalation every six (6) hours as needed for Wheezing or Shortness of Breath. 1 Inhaler 12    umeclidinium (INCRUSE ELLIPTA) 62.5 mcg/actuation inhaler Take 1 Puff by inhalation daily. 3 Inhaler 12    hydroCHLOROthiazide (HYDRODIURIL) 25 mg tablet Take 1 Tab by mouth daily. 90 Tab 4    losartan (COZAAR) 50 mg tablet Take 1 Tab by mouth daily. 90 Tab 4    linagliptin (TRADJENTA) 5 mg tablet Take 1 Tab by mouth daily. 90 Tab 4    budesonide-formoterol (SYMBICORT) 160-4.5 mcg/actuation HFAA Take 2 Puffs by inhalation two (2) times a day.  3 Inhaler 12    omeprazole (PRILOSEC) 40 mg capsule Take 1 Cap by mouth two (2) times a day. 180 Cap 4    simvastatin (ZOCOR) 20 mg tablet Take 1 Tab by mouth nightly. 90 Tab 4     Allergies   Allergen Reactions    Percocet [Oxycodone-Acetaminophen] Nausea and Vomiting    Ultram [Tramadol] Swelling     Past Medical History:   Diagnosis Date    Advance directive in chart 7/11/2016    Asthma 12/2/2013    GERD (gastroesophageal reflux disease) 4/29/2011    HTN (hypertension) 4/29/2011    Rheumatoid arthritis(714.0) 4/29/2011    Type II or unspecified type diabetes mellitus without mention of complication, not stated as uncontrolled 4/29/2011     Past Surgical History:   Procedure Laterality Date    HX BREAST BIOPSY Right 1970's    rt benign    HX HYSTERECTOMY      HX NARCISO AND BSO  2004     Family History   Problem Relation Age of Onset    Hypertension Mother    Judieth Molly Arthritis-rheumatoid Mother     Stroke Father      Social History     Tobacco Use    Smoking status: Never Smoker    Smokeless tobacco: Never Used   Substance Use Topics    Alcohol use: No       ROS       All other systems reviewed and are negative.       Objective:  Vitals:    05/06/19 1013   BP: 138/69   Pulse: 63   Resp: 18   Temp: 98.2 °F (36.8 °C)   TempSrc: Oral   SpO2: 99%   Weight: 169 lb 12.8 oz (77 kg)   Height: 5' 2\" (1.575 m)   PainSc:   7   PainLoc: Toe                 alert, well appearing, and in no distress, oriented to person, place, and time and normal appearing weight  Chest - clear to auscultation, no wheezes, rales or rhonchi, symmetric air entry  Heart - normal rate, regular rhythm, normal S1, S2, no murmurs, rubs, clicks or gallops  Abdomen - soft, nontender, nondistended, no masses or organomegaly        LABS   Component      Latest Ref Rng & Units 2/4/2019 2/4/2019 2/4/2019 2/4/2019           9:43 AM  9:43 AM  9:43 AM  9:43 AM   WBC      4.6 - 13.2 K/uL       RBC      4.20 - 5.30 M/uL       HGB      12.0 - 16.0 g/dL       HCT      35.0 - 45.0 %       MCV      74.0 - 97.0 FL MCH      24.0 - 34.0 PG       MCHC      31.0 - 37.0 g/dL       RDW      11.6 - 14.5 %       PLATELET      054 - 741 K/uL       MPV      9.2 - 11.8 FL       NEUTROPHILS      40 - 73 %       LYMPHOCYTES      21 - 52 %       MONOCYTES      3 - 10 %       EOSINOPHILS      0 - 5 %       BASOPHILS      0 - 2 %       ABS. NEUTROPHILS      1.8 - 8.0 K/UL       ABS. LYMPHOCYTES      0.9 - 3.6 K/UL       ABS. MONOCYTES      0.05 - 1.2 K/UL       ABS. EOSINOPHILS      0.0 - 0.4 K/UL       ABS. BASOPHILS      0.0 - 0.1 K/UL       DF             Sodium      136 - 145 mmol/L   140    Potassium      3.5 - 5.5 mmol/L   4.1    Chloride      100 - 108 mmol/L   102    CO2      21 - 32 mmol/L   32    Anion gap      3.0 - 18 mmol/L   6    Glucose      74 - 99 mg/dL   99    BUN      7.0 - 18 MG/DL   16    Creatinine      0.6 - 1.3 MG/DL   1.00    BUN/Creatinine ratio      12 - 20     16    GFR est AA      >60 ml/min/1.73m2   >60    GFR est non-AA      >60 ml/min/1.73m2   55 (L)    Calcium      8.5 - 10.1 MG/DL   9.1    Bilirubin, total      0.2 - 1.0 MG/DL   0.5    ALT (SGPT)      13 - 56 U/L   18    AST      15 - 37 U/L   17    Alk.  phosphatase      45 - 117 U/L   85    Protein, total      6.4 - 8.2 g/dL   7.8    Albumin      3.4 - 5.0 g/dL   4.3    Globulin      2.0 - 4.0 g/dL   3.5    A-G Ratio      0.8 - 1.7     1.2    Color             Appearance             Specific gravity      1.005 - 1.030         pH (UA)      5.0 - 8.0         Protein      NEG mg/dL       Glucose      NEG mg/dL       Ketone      NEG mg/dL       Bilirubin      NEG         Blood      NEG         Urobilinogen      0.2 - 1.0 EU/dL       Nitrites      NEG         Leukocyte Esterase      NEG         Cholesterol, total      <200 MG/DL    120   Triglyceride      <150 MG/DL    68   HDL Cholesterol      40 - 60 MG/DL    52   LDL, calculated      0 - 100 MG/DL    54.4   VLDL, calculated      MG/DL    13.6   CHOL/HDL Ratio      0 - 5.0      2.3   WBC      0 - 4 /hpf RBC      0 - 5 /hpf       Epithelial cells      0 - 5 /lpf       Bacteria      NEG /hpf       Microalbumin,urine random      0 - 3.0 MG/DL 4.59 (H)      Creatinine, urine      30 - 125 mg/dL 328.00 (H)      Microalbumin/Creat. Ratio      0 - 30 mg/g 14      Hemoglobin A1c, (calculated)      4.2 - 5.6 %       Est. average glucose      mg/dL       TSH      0.36 - 3.74 uIU/mL  0.76       Component      Latest Ref Rng & Units 2/4/2019 2/4/2019 2/4/2019           9:43 AM  9:43 AM  9:43 AM   WBC      4.6 - 13.2 K/uL      RBC      4.20 - 5.30 M/uL      HGB      12.0 - 16.0 g/dL      HCT      35.0 - 45.0 %      MCV      74.0 - 97.0 FL      MCH      24.0 - 34.0 PG      MCHC      31.0 - 37.0 g/dL      RDW      11.6 - 14.5 %      PLATELET      391 - 208 K/uL      MPV      9.2 - 11.8 FL      NEUTROPHILS      40 - 73 %      LYMPHOCYTES      21 - 52 %      MONOCYTES      3 - 10 %      EOSINOPHILS      0 - 5 %      BASOPHILS      0 - 2 %      ABS. NEUTROPHILS      1.8 - 8.0 K/UL      ABS. LYMPHOCYTES      0.9 - 3.6 K/UL      ABS. MONOCYTES      0.05 - 1.2 K/UL      ABS. EOSINOPHILS      0.0 - 0.4 K/UL      ABS. BASOPHILS      0.0 - 0.1 K/UL      DF            Sodium      136 - 145 mmol/L      Potassium      3.5 - 5.5 mmol/L      Chloride      100 - 108 mmol/L      CO2      21 - 32 mmol/L      Anion gap      3.0 - 18 mmol/L      Glucose      74 - 99 mg/dL      BUN      7.0 - 18 MG/DL      Creatinine      0.6 - 1.3 MG/DL      BUN/Creatinine ratio      12 - 20        GFR est AA      >60 ml/min/1.73m2      GFR est non-AA      >60 ml/min/1.73m2      Calcium      8.5 - 10.1 MG/DL      Bilirubin, total      0.2 - 1.0 MG/DL      ALT (SGPT)      13 - 56 U/L      AST      15 - 37 U/L      Alk.  phosphatase      45 - 117 U/L      Protein, total      6.4 - 8.2 g/dL      Albumin      3.4 - 5.0 g/dL      Globulin      2.0 - 4.0 g/dL      A-G Ratio      0.8 - 1.7        Color        YELLOW    Appearance        CLOUDY    Specific gravity 1.005 - 1.030    1.024    pH (UA)      5.0 - 8.0    6.5    Protein      NEG mg/dL  NEGATIVE    Glucose      NEG mg/dL  NEGATIVE    Ketone      NEG mg/dL  NEGATIVE    Bilirubin      NEG    NEGATIVE    Blood      NEG    TRACE (A)    Urobilinogen      0.2 - 1.0 EU/dL  0.2    Nitrites      NEG    NEGATIVE    Leukocyte Esterase      NEG    MODERATE (A)    Cholesterol, total      <200 MG/DL      Triglyceride      <150 MG/DL      HDL Cholesterol      40 - 60 MG/DL      LDL, calculated      0 - 100 MG/DL      VLDL, calculated      MG/DL      CHOL/HDL Ratio      0 - 5.0        WBC      0 - 4 /hpf 8 to 12     RBC      0 - 5 /hpf 2 to 3     Epithelial cells      0 - 5 /lpf FEW     Bacteria      NEG /hpf 3+ (A)     Microalbumin,urine random      0 - 3.0 MG/DL      Creatinine, urine      30 - 125 mg/dL      Microalbumin/Creat. Ratio      0 - 30 mg/g      Hemoglobin A1c, (calculated)      4.2 - 5.6 %   6.3 (H)   Est. average glucose      mg/dL   134   TSH      0.36 - 3.74 uIU/mL        Component      Latest Ref Rng & Units 2/4/2019           9:43 AM   WBC      4.6 - 13.2 K/uL 6.1   RBC      4.20 - 5.30 M/uL 4.15 (L)   HGB      12.0 - 16.0 g/dL 12.5   HCT      35.0 - 45.0 % 38.0   MCV      74.0 - 97.0 FL 91.6   MCH      24.0 - 34.0 PG 30.1   MCHC      31.0 - 37.0 g/dL 32.9   RDW      11.6 - 14.5 % 13.7   PLATELET      503 - 520 K/uL 264   MPV      9.2 - 11.8 FL 9.8   NEUTROPHILS      40 - 73 % 60   LYMPHOCYTES      21 - 52 % 29   MONOCYTES      3 - 10 % 6   EOSINOPHILS      0 - 5 % 4   BASOPHILS      0 - 2 % 1   ABS. NEUTROPHILS      1.8 - 8.0 K/UL 3.7   ABS. LYMPHOCYTES      0.9 - 3.6 K/UL 1.7   ABS. MONOCYTES      0.05 - 1.2 K/UL 0.4   ABS. EOSINOPHILS      0.0 - 0.4 K/UL 0.2   ABS.  BASOPHILS      0.0 - 0.1 K/UL 0.0   DF       AUTOMATED   Sodium      136 - 145 mmol/L    Potassium      3.5 - 5.5 mmol/L    Chloride      100 - 108 mmol/L    CO2      21 - 32 mmol/L    Anion gap      3.0 - 18 mmol/L    Glucose      74 - 99 mg/dL BUN      7.0 - 18 MG/DL    Creatinine      0.6 - 1.3 MG/DL    BUN/Creatinine ratio      12 - 20      GFR est AA      >60 ml/min/1.73m2    GFR est non-AA      >60 ml/min/1.73m2    Calcium      8.5 - 10.1 MG/DL    Bilirubin, total      0.2 - 1.0 MG/DL    ALT (SGPT)      13 - 56 U/L    AST      15 - 37 U/L    Alk. phosphatase      45 - 117 U/L    Protein, total      6.4 - 8.2 g/dL    Albumin      3.4 - 5.0 g/dL    Globulin      2.0 - 4.0 g/dL    A-G Ratio      0.8 - 1.7      Color          Appearance          Specific gravity      1.005 - 1.030      pH (UA)      5.0 - 8.0      Protein      NEG mg/dL    Glucose      NEG mg/dL    Ketone      NEG mg/dL    Bilirubin      NEG      Blood      NEG      Urobilinogen      0.2 - 1.0 EU/dL    Nitrites      NEG      Leukocyte Esterase      NEG      Cholesterol, total      <200 MG/DL    Triglyceride      <150 MG/DL    HDL Cholesterol      40 - 60 MG/DL    LDL, calculated      0 - 100 MG/DL    VLDL, calculated      MG/DL    CHOL/HDL Ratio      0 - 5.0      WBC      0 - 4 /hpf    RBC      0 - 5 /hpf    Epithelial cells      0 - 5 /lpf    Bacteria      NEG /hpf    Microalbumin,urine random      0 - 3.0 MG/DL    Creatinine, urine      30 - 125 mg/dL    Microalbumin/Creat. Ratio      0 - 30 mg/g    Hemoglobin A1c, (calculated)      4.2 - 5.6 %    Est. average glucose      mg/dL    TSH      0.36 - 3.74 uIU/mL      TESTS      Assessment/Plan:    Diabetes - stable  Hypertension - stable  Hyperlipidemia - stable  Asthma and copd stable  gerd stable  rhuematod arthris appears in remission  Toe pain >??? Not sure if this is a corn or what she will observe and see podiatrist if persists      Lab review: labs are reviewed, up to date and normal    Diagnoses and all orders for this visit:    1. Type 2 diabetes mellitus with nephropathy (HCC)  -     AMB POC HEMOGLOBIN A1C          I have discussed the diagnosis with the patient and the intended plan as seen in the above orders.   The patient has received an after-visit summary and questions were answered concerning future plans. I have discussed medication side effects and warnings with the patient as well. I have reviewed the plan of care with the patient, accepted their input and they are in agreement with the treatment goals.

## 2019-05-06 NOTE — PATIENT INSTRUCTIONS
Chronic Obstructive Pulmonary Disease (COPD): Care Instructions  Your Care Instructions    Chronic obstructive pulmonary disease (COPD) is a general term for a group of lung diseases, including emphysema and chronic bronchitis. People with COPD have decreased airflow in and out of the lungs, which makes it hard to breathe. The airways also can get clogged with thick mucus. Cigarette smoking is a major cause of COPD. Although there is no cure for COPD, you can slow its progress. Following your treatment plan and taking care of yourself can help you feel better and live longer. Follow-up care is a key part of your treatment and safety. Be sure to make and go to all appointments, and call your doctor if you are having problems. It's also a good idea to know your test results and keep a list of the medicines you take. How can you care for yourself at home?   Staying healthy    · Do not smoke. This is the most important step you can take to prevent more damage to your lungs. If you need help quitting, talk to your doctor about stop-smoking programs and medicines. These can increase your chances of quitting for good.     · Avoid colds and flu. Get a pneumococcal vaccine shot. If you have had one before, ask your doctor whether you need a second dose. Get the flu vaccine every fall. If you must be around people with colds or the flu, wash your hands often.     · Avoid secondhand smoke, air pollution, and high altitudes. Also avoid cold, dry air and hot, humid air. Stay at home with your windows closed when air pollution is bad.    Medicines and oxygen therapy    · Take your medicines exactly as prescribed. Call your doctor if you think you are having a problem with your medicine.     · You may be taking medicines such as:  ? Bronchodilators. These help open your airways and make breathing easier. Bronchodilators are either short-acting (work for 6 to 9 hours) or long-acting (work for 24 hours).  You inhale most bronchodilators, so they start to act quickly. Always carry your quick-relief inhaler with you in case you need it while you are away from home. ? Corticosteroids (prednisone, budesonide). These reduce airway inflammation. They come in pill or inhaled form. You must take these medicines every day for them to work well.     · A spacer may help you get more inhaled medicine to your lungs. Ask your doctor or pharmacist if a spacer is right for you. If it is, ask how to use it properly.     · Do not take any vitamins, over-the-counter medicine, or herbal products without talking to your doctor first.     · If your doctor prescribed antibiotics, take them as directed. Do not stop taking them just because you feel better. You need to take the full course of antibiotics.     · Oxygen therapy boosts the amount of oxygen in your blood and helps you breathe easier. Use the flow rate your doctor has recommended, and do not change it without talking to your doctor first.   Activity    · Get regular exercise. Walking is an easy way to get exercise. Start out slowly, and walk a little more each day.     · Pay attention to your breathing. You are exercising too hard if you cannot talk while you are exercising.     · Take short rest breaks when doing household chores and other activities.     · Learn breathing methods--such as breathing through pursed lips--to help you become less short of breath.     · If your doctor has not set you up with a pulmonary rehabilitation program, talk to him or her about whether rehab is right for you. Rehab includes exercise programs, education about your disease and how to manage it, help with diet and other changes, and emotional support. Diet    · Eat regular, healthy meals. Use bronchodilators about 1 hour before you eat to make it easier to eat. Eat several small meals instead of three large ones.  Drink beverages at the end of the meal. Avoid foods that are hard to chew.     · Eat foods that contain protein so that you do not lose muscle mass.     · Talk with your doctor if you gain too much weight or if you lose weight without trying.    Mental health    · Talk to your family, friends, or a therapist about your feelings. It is normal to feel frightened, angry, hopeless, helpless, and even guilty. Talking openly about bad feelings can help you cope. If these feelings last, talk to your doctor. When should you call for help? Call 911 anytime you think you may need emergency care. For example, call if:    · You have severe trouble breathing.    Call your doctor now or seek immediate medical care if:    · You have new or worse trouble breathing.     · You cough up blood.     · You have a fever.    Watch closely for changes in your health, and be sure to contact your doctor if:    · You cough more deeply or more often, especially if you notice more mucus or a change in the color of your mucus.     · You have new or worse swelling in your legs or belly.     · You are not getting better as expected. Where can you learn more? Go to http://rosette-mayank.info/. Janee Cuello in the search box to learn more about \"Chronic Obstructive Pulmonary Disease (COPD): Care Instructions. \"  Current as of: September 5, 2018  Content Version: 11.9  © 7677-0318 [employer]. Care instructions adapted under license by [employer] (which disclaims liability or warranty for this information). If you have questions about a medical condition or this instruction, always ask your healthcare professional. [place] [address] any warranty or liability for your use of this information. COPD and Asthma: Care Instructions  Your Care Instructions    Some people who have chronic obstructive pulmonary disease (COPD) also have asthma. Both of these problems can damage your lungs. This makes it very important to control them.   Asthma causes the airways that lead to the lungs to swell and become narrow. This makes it hard to breathe. You may wheeze or cough. If you have a bad attack, you may need emergency care. There are two parts to treating asthma. · Controlling asthma over the long term. · Treating attacks when they occur. You and your doctor can make an asthma treatment plan that will help. This plan tells you the medicines you take every day to reduce the swelling in your airways and prevent attacks. It also tells you what to do if you have an asthma attack. Follow-up care is a key part of your treatment and safety. Be sure to make and go to all appointments, and call your doctor if you are having problems. It's also a good idea to know your test results and keep a list of the medicines you take. How can you care for yourself at home? To control asthma over the long term  Medicines  Controller medicines reduce swelling in your lungs. They also prevent asthma attacks. Take your controller medicine exactly as prescribed. Talk to your doctor if you have any problems with your medicine. · Inhaled corticosteroid is a common and effective controller medicine. Using it the right way can prevent or reduce most side effects. · Take your controller medicine every day, not just when you have symptoms. This helps prevent problems before they occur. · Always bring your asthma medicine with you when you travel. · Your doctor may prescribe long-acting medicine that combines a corticosteroid with a beta2-agonist. Follow your doctor's instructions exactly about how to take a long-acting medicine. Examples include:  ? Fluticasone and salmeterol (Advair). ? Budesonide and formoterol (Symbicort). · Do not depend on your controller medicines to stop an asthma attack that has already started. They do not work fast enough to help. · Your doctor may also prescribe anticholinergic inhalers. These include ipratropium (Atrovent) and tiotropium (Spiriva).   Education  · Learn what sets off an asthma attack. Avoid these triggers when you can. Common triggers include smoke, air pollution, pollen, animal dander, colds, stress, and cold air. · Do not smoke. Smoking can make COPD and asthma worse. If you need help quitting, talk to your doctor about stop-smoking programs and medicines. These can increase your chances of quitting for good. · Check yourself for symptoms to know which step to follow in your action plan. Watch for things like being short of breath, having chest tightness, coughing, and wheezing. Also notice if symptoms wake you up at night or if you get tired quickly when you exercise. · You may want to learn how to use a peak flow meter. This measures how open your airways are. It may help you know when you will have an asthma attack. To treat attacks when they occur  Use your asthma action plan when you have an attack. Your quick-relief medicine, such as albuterol, will stop an asthma attack. It relaxes the muscles that get tight around the airways. · Take your quick-relief medicine exactly as prescribed. Talk with your doctor if you have any problems with your medicine. · Keep this medicine with you at all times. · You may need to use this medicine before you exercise. If your doctor prescribed corticosteroid pills to use during an attack, take them as directed. They may take hours to work, but they may shorten the attack and help you breathe better. When should you call for help? Call 911 anytime you think you may need emergency care.  For example, call if:    · You have severe trouble breathing.    Call your doctor now or seek immediate medical care if:    · You have new or worse shortness of breath.     · You are coughing more deeply or more often, especially if you notice more mucus or a change in the color of your mucus.     · You cough up blood.     · You have new or increased swelling in your legs or belly.     · You have a fever.     · You have used your quick-relief medicine but you are still short of breath.    Watch closely for changes in your health, and be sure to contact your doctor if you have any problems. Where can you learn more? Go to http://rosette-mayank.info/. Enter A350 in the search box to learn more about \"COPD and Asthma: Care Instructions. \"  Current as of: September 5, 2018  Content Version: 11.9  © 4987-4483 [employer]. Care instructions adapted under license by [employer] (which disclaims liability or warranty for this information). If you have questions about a medical condition or this instruction, always ask your healthcare professional. [place] [address] any warranty or liability for your use of this information. Learning About COPD  What is COPD? COPD is a lung disease that makes it hard to breathe. COPD stands for chronic obstructive pulmonary disease. It is caused by damage to the lungs over many years, usually from smoking. COPD is a mix of two diseases: chronic bronchitis and emphysema. Other things that may put you at risk for COPD include breathing chemical fumes, dust, or air pollution over a long period of time. Secondhand smoke is also bad. In chronic bronchitis, the airways that carry air to the lungs (bronchial tubes) get inflamed and make a lot of mucus. This can narrow or block the airways, making it hard for you to breathe. In emphysema, the air sacs in your lungs are damaged and lose their stretch. Less air gets in and out of your lungs, which makes you feel short of breath. What can you expect when you have COPD? COPD gets worse over time. You cannot undo the damage to your lungs. Over time, you may find that:  · You get short of breath even when you do simple things like get dressed or fix a meal.  · It is hard to eat or exercise. · You lose weight and feel weaker. But there are things you can do to prevent more damage and feel better. What are the symptoms?   The main symptoms are:  · A cough that will not go away. · Mucus that comes up when you cough. · Shortness of breath that gets worse with activity. At times, your symptoms may suddenly flare up and get much worse. This is a called a COPD exacerbation (say \"egg-ZASS-er-BAY-shun\"). When this happens, your usual symptoms quickly get worse and stay bad. This can be dangerous. You may have to go to the hospital.  How can you keep COPD from getting worse? Don't smoke. That is the best way to keep COPD from getting worse. If you already smoke, it is never too late to stop. If you need help quitting, talk to your doctor about stop-smoking programs and medicines. These can increase your chances of quitting for good. You can do other things to keep COPD from getting worse:  · Avoid bad air. Air pollution, chemical fumes, and dust also can make COPD worse. · Get a flu shot every year. A shot may keep the flu from turning into something more serious, like pneumonia. A flu shot also may lower your chances of having a COPD flare-up. · Get a pneumococcal vaccine shot. If you have had one before, ask your doctor if you need another dose. How is COPD treated? COPD is treated with medicines and oxygen. You also can take steps at home to stay healthy and keep your COPD from getting worse. Medicines and oxygen therapy  · You may be taking medicines such as:  ? Bronchodilators. These help open your airways and make breathing easier. Bronchodilators are either short-acting (work for 6 to 9 hours) or long-acting (work for 24 hours). You inhale most bronchodilators, so they start to act quickly. Always carry your quick-relief inhaler with you in case you need it while you are away from home. ? Corticosteroids. These reduce airway inflammation. They come in pill or inhaled form. You must take these medicines every day for them to work well. · Take your medicines exactly as prescribed.  Call your doctor if you think you are having a problem with your medicine. · Oxygen therapy boosts the amount of oxygen in your blood and helps you breathe easier. Use the flow rate your doctor has recommended, and do not change it without talking to your doctor first.  Other care at home  · If your doctor recommends it, get more exercise. Walking is a good choice. Bit by bit, increase the amount you walk every day. Try for at least 30 minutes on most days of the week. · Learn breathing methods--such as breathing through pursed lips--to help you become less short of breath. · If your doctor has not set you up with a pulmonary rehabilitation program, talk to him or her about whether rehab is right for you. Rehab includes exercise programs, education about your disease and how to manage it, help with diet and other changes, and emotional support. · Eat regular, healthy meals. Use bronchodilators about 1 hour before you eat to make it easier to eat. Eat several small meals instead of three large ones. Drink beverages at the end of the meal. Avoid foods that are hard to chew. Follow-up care is a key part of your treatment and safety. Be sure to make and go to all appointments, and call your doctor if you are having problems. It's also a good idea to know your test results and keep a list of the medicines you take. Where can you learn more? Go to http://rosette-mayank.info/. Enter V314 in the search box to learn more about \"Learning About COPD. \"  Current as of: September 5, 2018  Content Version: 11.9  © 9932-0461 [employer]. Care instructions adapted under license by [employer] (which disclaims liability or warranty for this information). If you have questions about a medical condition or this instruction, always ask your healthcare professional. [address] any warranty or liability for your use of this information.

## 2019-05-07 LAB — HBA1C MFR BLD HPLC: 5.9 % (ref 4.2–5.8)

## 2019-07-18 ENCOUNTER — TELEPHONE (OUTPATIENT)
Dept: FAMILY MEDICINE CLINIC | Age: 69
End: 2019-07-18

## 2019-07-18 NOTE — TELEPHONE ENCOUNTER
Patient requesting diabetic supplies sent to her pharmacy, she stated she has been waiting since June but no documentation of anything. Please advise, thank you. Patient does have upcoming appointment with Dr. Tyler Wadsworth in Nov. But refused to make a new one since she was just seen in may.

## 2019-07-22 NOTE — TELEPHONE ENCOUNTER
Nurse called patient to explain that her diabetes is well controlled and testing is not advised at this time. Pt states she was testing twice a day and that medicaid stated they will pay for testing  Twice a day. Nurse explained that she is not on insulin and that this time it is not recommended for her to test but provider will check in November. [address] [facility] [phone] nurse called and spoke with representative and advised that testing is not recommended. This encounter will be closed.

## 2019-07-22 NOTE — TELEPHONE ENCOUNTER
Please inform pt that her diabetes is well controlled and I do not recommend testing at home. We will evaluate progress in November. Thank you.

## 2019-11-05 ENCOUNTER — OFFICE VISIT (OUTPATIENT)
Dept: FAMILY MEDICINE CLINIC | Age: 69
End: 2019-11-05

## 2019-11-05 VITALS
WEIGHT: 167 LBS | HEART RATE: 74 BPM | HEIGHT: 62 IN | OXYGEN SATURATION: 97 % | TEMPERATURE: 97.6 F | RESPIRATION RATE: 17 BRPM | BODY MASS INDEX: 30.73 KG/M2 | SYSTOLIC BLOOD PRESSURE: 130 MMHG | DIASTOLIC BLOOD PRESSURE: 80 MMHG

## 2019-11-05 DIAGNOSIS — M05.79 RHEUMATOID ARTHRITIS INVOLVING MULTIPLE SITES WITH POSITIVE RHEUMATOID FACTOR (HCC): ICD-10-CM

## 2019-11-05 DIAGNOSIS — E78.00 PURE HYPERCHOLESTEROLEMIA: ICD-10-CM

## 2019-11-05 DIAGNOSIS — E11.21 CONTROLLED TYPE 2 DIABETES MELLITUS WITH DIABETIC NEPHROPATHY, WITHOUT LONG-TERM CURRENT USE OF INSULIN (HCC): ICD-10-CM

## 2019-11-05 DIAGNOSIS — K21.00 GASTROESOPHAGEAL REFLUX DISEASE WITH ESOPHAGITIS: ICD-10-CM

## 2019-11-05 DIAGNOSIS — E11.21 TYPE 2 DIABETES MELLITUS WITH NEPHROPATHY (HCC): ICD-10-CM

## 2019-11-05 DIAGNOSIS — I10 ESSENTIAL HYPERTENSION: ICD-10-CM

## 2019-11-05 DIAGNOSIS — L84 PRE-ULCERATIVE CORN OR CALLOUS: Primary | ICD-10-CM

## 2019-11-05 DIAGNOSIS — J45.909 MILD ASTHMA WITHOUT COMPLICATION, UNSPECIFIED WHETHER PERSISTENT: ICD-10-CM

## 2019-11-05 DIAGNOSIS — M06.9 RHEUMATOID ARTHRITIS, INVOLVING UNSPECIFIED SITE, UNSPECIFIED RHEUMATOID FACTOR PRESENCE: ICD-10-CM

## 2019-11-05 DIAGNOSIS — Z23 ENCOUNTER FOR IMMUNIZATION: ICD-10-CM

## 2019-11-05 LAB — HBA1C MFR BLD HPLC: 6 %

## 2019-11-05 RX ORDER — OMEPRAZOLE 40 MG/1
40 CAPSULE, DELAYED RELEASE ORAL 2 TIMES DAILY
Qty: 180 CAP | Refills: 4 | Status: SHIPPED | OUTPATIENT
Start: 2019-11-05 | End: 2020-09-09 | Stop reason: SDUPTHER

## 2019-11-05 RX ORDER — HYDROCHLOROTHIAZIDE 25 MG/1
25 TABLET ORAL DAILY
Qty: 90 TAB | Refills: 4 | Status: CANCELLED | OUTPATIENT
Start: 2019-11-05

## 2019-11-05 RX ORDER — HYDROCHLOROTHIAZIDE 25 MG/1
25 TABLET ORAL DAILY
Qty: 90 TAB | Refills: 4 | Status: SHIPPED | OUTPATIENT
Start: 2019-11-05 | End: 2020-04-07 | Stop reason: SDUPTHER

## 2019-11-05 RX ORDER — LOSARTAN POTASSIUM 50 MG/1
50 TABLET ORAL DAILY
Qty: 90 TAB | Refills: 4 | Status: CANCELLED | OUTPATIENT
Start: 2019-11-05

## 2019-11-05 RX ORDER — LIDOCAINE 50 MG/G
PATCH TOPICAL
Qty: 30 EACH | Refills: 2 | Status: SHIPPED | OUTPATIENT
Start: 2019-11-05 | End: 2019-11-13 | Stop reason: CLARIF

## 2019-11-05 RX ORDER — LOSARTAN POTASSIUM 50 MG/1
50 TABLET ORAL DAILY
Qty: 90 TAB | Refills: 4 | Status: SHIPPED | OUTPATIENT
Start: 2019-11-05 | End: 2020-03-31 | Stop reason: ALTCHOICE

## 2019-11-05 RX ORDER — OMEPRAZOLE 40 MG/1
40 CAPSULE, DELAYED RELEASE ORAL 2 TIMES DAILY
Qty: 180 CAP | Refills: 4 | Status: CANCELLED | OUTPATIENT
Start: 2019-11-05

## 2019-11-05 RX ORDER — SIMVASTATIN 20 MG/1
20 TABLET, FILM COATED ORAL
Qty: 90 TAB | Refills: 4 | Status: CANCELLED | OUTPATIENT
Start: 2019-11-05

## 2019-11-05 RX ORDER — SIMVASTATIN 20 MG/1
20 TABLET, FILM COATED ORAL
Qty: 90 TAB | Refills: 4 | Status: SHIPPED | OUTPATIENT
Start: 2019-11-05 | End: 2020-04-07 | Stop reason: SDUPTHER

## 2019-11-05 NOTE — PROGRESS NOTES
Nancy Hutchins is a 71 y.o.  female and presents with    Chief Complaint   Patient presents with    Establish Care    Foot Pain     Bunion    Diabetes     A1C check    Diabetic Foot Exam       Subjective:  Hypertension  Patient is here for follow-up of hypertension. She indicates that she is feeling well and denies any symptoms referable to her hypertension. She is not exercising and is adherent to low salt diet. Blood pressure is well controlled at home. Use of agents associated with hypertension: none. She has callous on her left 5th toe; she has been treated by podiatry in the past with last visit     Diabetes Mellitus:  She has diabetes mellitus, and  diabetes, hypertension, hyperlipidemia and obesity. Diabetic ROS - medication compliance: compliant all of the time, diabetic diet compliance: compliant most of the time. Lab review: orders written for new lab studies as appropriate; see orders. ROS   General ROS: negative for - chills, fatigue or fever  Psychological ROS: negative for - anxiety or depression  Ophthalmic ROS: positive for - uses glasses  ENT ROS: negative for - headaches, nasal congestion or sore throat  Endocrine ROS: negative for - polydipsia/polyuria or temperature intolerance  Respiratory ROS: no cough, shortness of breath, or wheezing  Cardiovascular ROS: no chest pain or dyspnea on exertion  Gastrointestinal ROS: no abdominal pain, change in bowel habits, or black or bloody stools  Genito-Urinary ROS: no dysuria, trouble voiding, or hematuria  Musculoskeletal ROS: positive for - pain in toe - left  Neurological ROS: negative for - numbness/tingling  Dermatological ROS: positive for - skin lesion changes    All other systems reviewed and are negative.       Objective:  Vitals:    11/05/19 0920   BP: 145/78   Pulse: 74   Resp: 17   Temp: 97.6 °F (36.4 °C)   TempSrc: Oral   SpO2: 97%   Weight: 167 lb (75.8 kg)   Height: 5' 2\" (1.575 m)   PainSc:   0 - No pain alert, well appearing, and in no distress, oriented to person, place, and time and obese  Mental status - normal mood, behavior, speech, dress, motor activity, and thought processes  Chest - clear to auscultation, no wheezes, rales or rhonchi, symmetric air entry  Heart - normal rate, regular rhythm, normal S1, S2, no murmurs, rubs, clicks or gallops  Neurological - cranial nerves II through XII intact  Extremities - peripheral pulses normal, no pedal edema, no clubbing or cyanosis  Diabetic foot exam:     Left Foot:   Visual Exam: callous - 5th toe   Pulse DP: 2+ (normal)   Filament test: normal sensation        Right Foot:   Visual Exam: normal    Pulse DP: 2+ (normal)   Filament test: normal sensation      LABS   hgb a1c 6    Assessment/Plan:    1. Gastroesophageal reflux disease with esophagitis  Symptoms controlled  - omeprazole (PRILOSEC) 40 mg capsule; Take 1 Cap by mouth two (2) times a day. Dispense: 180 Cap; Refill: 4    2. Essential hypertension  Goal <130/80  - hydroCHLOROthiazide (HYDRODIURIL) 25 mg tablet; Take 1 Tab by mouth daily. Dispense: 90 Tab; Refill: 4  - losartan (COZAAR) 50 mg tablet; Take 1 Tab by mouth daily. Dispense: 90 Tab; Refill: 4    3. Controlled type 2 diabetes mellitus with diabetic nephropathy, without long-term current use of insulin ([facility])  Goal hgb a1c<7  - AMB POC HEMOGLOBIN A1C  - linaGLIPtin (TRADJENTA) 5 mg tablet; Take 1 Tab by mouth daily. Dispense: 90 Tab; Refill: 4  - HM DIABETES FOOT EXAM    4. Rheumatoid arthritis involving multiple sites with positive rheumatoid factor (HCC)  Pain controlled    5. Pure hypercholesterolemia  statin  - simvastatin (ZOCOR) 20 mg tablet; Take 1 Tab by mouth nightly. Dispense: 90 Tab; Refill: 4    6. Mild asthma without complication, unspecified whether persistent  Continue inhaled therapy    7. Type 2 diabetes mellitus with nephropathy ([facility] [address]      8.  Encounter for immunization    - INFLUENZA VACCINE INACTIVATED (IIV), SUBUNIT, ADJUVANTED, IM  - ADMIN INFLUENZA VIRUS VAC    9. Rheumatoid arthritis, involving unspecified site, unspecified rheumatoid factor presence ([facility] [address]      10. Pre-ulcerative corn or callous  Foot care reviewed      Lab review: labs reviewed, I note that glycosylated hemoglobin mildly abnormal but acceptable      I have discussed the diagnosis with the patient and the intended plan as seen in the above orders. The patient has received an after-visit summary and questions were answered concerning future plans. I have discussed medication side effects and warnings with the patient as well. I have reviewed the plan of care with the patient, accepted their input and they are in agreement with the treatment goals.

## 2019-11-05 NOTE — PATIENT INSTRUCTIONS
Corns and Calluses: Care Instructions  Your Care Instructions  Corns and calluses are areas of thick, hard, dead skin. They form to protect your skin from injury. Corns usually form where toes rub together. Calluses often form on the hands or feet. They may form wherever the skin rubs against something, such as shoes. In most cases, you can take steps at home to care for a corn or callus. Follow-up care is a key part of your treatment and safety. Be sure to make and go to all appointments, and call your doctor if you are having problems. It's also a good idea to know your test results and keep a list of the medicines you take. How can you care for yourself at home? · Wear shoes and other footwear that fit correctly and are roomy. This will reduce rubbing and give corns or calluses time to heal.  · Use protective pads, such as moleskin, to cushion the callus or corn. · Soften the corn or callus and remove the dead skin by using over-the-counter products such as salicylic acid. If you have a condition that causes problems with blood flow (such as peripheral vascular disease) or loss of feeling in your feet (such as diabetes), talk to your doctor before you try any home treatment. · Soak your corn or callus in warm water, and then use a pumice stone to rub dead skin away. · Wash your feet regularly, and rub lotion into your feet while they are still moist. Dry skin can cause a callus to crack and bleed. · Never cut the corn or callus yourself, especially if you have problems with blood flow to your legs or feet. When should you call for help? Call your doctor now or seek immediate medical care if:    · You have signs of infection, such as:  ? Increased pain, swelling, warmth, or redness around the corn or callus. ? Red streaks leading from the corn or callus. ? Pus draining from the corn or callus.   ? A fever.    Watch closely for changes in your health, and be sure to contact your doctor if:    · You do not get better as expected. Where can you learn more? Go to http://rosette-mayank.info/. Enter R244 in the search box to learn more about \"Corns and Calluses: Care Instructions. \"  Current as of: April 1, 2019  Content Version: 12.2  © 6343-2074 [employer]. Care instructions adapted under license by [employer] (which disclaims liability or warranty for this information). If you have questions about a medical condition or this instruction, always ask your healthcare professional. [address] any warranty or liability for your use of this information.

## 2019-11-05 NOTE — PROGRESS NOTES
Ailyn Friend is a 71 y.o. female who presents for routine immunizations. She denies any symptoms , reactions or allergies that would exclude them from being immunized today. Risks and adverse reactions were discussed and the VIS was given to them. All questions were addressed. She was observed for 15 min post injection. There were no reactions observed.     Edwar Lopez LPN

## 2019-11-05 NOTE — PROGRESS NOTES
Wanda Bailey is a 71 y.o female that is present in the office for a routine appointment for diabetes check and medication evaluation. 1. Have you been to the ER, urgent care clinic since your last visit? Hospitalized since your last visit? No    2. Have you seen or consulted any other health care providers outside of the [address] [place] [address] since your last visit? Include any pap smears or colon screening.  No    Health Maintenance Due   Topic Date Due    Shingrix Vaccine Age 49> (1 of 2) 08/23/2000    Pneumococcal 65+ years (2 of 2 - PPSV23) 10/24/2016    FOOT EXAM Q1  01/23/2019    Influenza Age 5 to Adult  08/01/2019    HEMOGLOBIN A1C Q6M  11/07/2019

## 2019-11-11 ENCOUNTER — TELEPHONE (OUTPATIENT)
Dept: FAMILY MEDICINE CLINIC | Age: 69
End: 2019-11-11

## 2019-11-11 NOTE — TELEPHONE ENCOUNTER
[facility] pharmacy stated that the PA for the patient, Lidoderm patch, has been denied and an alternative was needed. Please advise. Thank you.

## 2019-11-13 DIAGNOSIS — M05.79 RHEUMATOID ARTHRITIS INVOLVING MULTIPLE SITES WITH POSITIVE RHEUMATOID FACTOR (HCC): Primary | ICD-10-CM

## 2019-11-13 RX ORDER — LIDOCAINE HYDROCHLORIDE 30 MG/G
CREAM TOPICAL 2 TIMES DAILY
Qty: 453.6 G | Refills: 1 | Status: SHIPPED | OUTPATIENT
Start: 2019-11-13 | End: 2022-09-14

## 2020-03-30 ENCOUNTER — TELEPHONE (OUTPATIENT)
Dept: FAMILY MEDICINE CLINIC | Age: 70
End: 2020-03-30

## 2020-03-31 DIAGNOSIS — I10 ESSENTIAL HYPERTENSION: Primary | ICD-10-CM

## 2020-03-31 RX ORDER — VALSARTAN 80 MG/1
80 TABLET ORAL DAILY
Qty: 30 TAB | Refills: 5 | Status: SHIPPED | OUTPATIENT
Start: 2020-03-31 | End: 2020-09-09 | Stop reason: SDUPTHER

## 2020-04-02 DIAGNOSIS — E78.00 PURE HYPERCHOLESTEROLEMIA: ICD-10-CM

## 2020-04-02 DIAGNOSIS — J43.9 PULMONARY EMPHYSEMA, UNSPECIFIED EMPHYSEMA TYPE (HCC): ICD-10-CM

## 2020-04-02 DIAGNOSIS — Z23 ENCOUNTER FOR IMMUNIZATION: ICD-10-CM

## 2020-04-02 DIAGNOSIS — E11.21 CONTROLLED TYPE 2 DIABETES MELLITUS WITH DIABETIC NEPHROPATHY, WITHOUT LONG-TERM CURRENT USE OF INSULIN (HCC): ICD-10-CM

## 2020-04-02 DIAGNOSIS — M05.79 RHEUMATOID ARTHRITIS INVOLVING MULTIPLE SITES WITH POSITIVE RHEUMATOID FACTOR (HCC): ICD-10-CM

## 2020-04-02 DIAGNOSIS — I10 ESSENTIAL HYPERTENSION: ICD-10-CM

## 2020-04-02 DIAGNOSIS — Z00.00 ROUTINE GENERAL MEDICAL EXAMINATION AT A HEALTH CARE FACILITY: ICD-10-CM

## 2020-04-02 DIAGNOSIS — E11.21 TYPE 2 DIABETES MELLITUS WITH NEPHROPATHY (HCC): ICD-10-CM

## 2020-04-02 DIAGNOSIS — J45.909 MILD ASTHMA WITHOUT COMPLICATION, UNSPECIFIED WHETHER PERSISTENT: ICD-10-CM

## 2020-04-02 DIAGNOSIS — K21.00 GASTROESOPHAGEAL REFLUX DISEASE WITH ESOPHAGITIS: ICD-10-CM

## 2020-04-03 RX ORDER — BUDESONIDE AND FORMOTEROL FUMARATE DIHYDRATE 160; 4.5 UG/1; UG/1
AEROSOL RESPIRATORY (INHALATION)
Qty: 30.6 INHALER | Refills: 12 | Status: SHIPPED | OUTPATIENT
Start: 2020-04-03 | End: 2020-09-09 | Stop reason: SDUPTHER

## 2020-04-03 RX ORDER — UMECLIDINIUM 62.5 UG/1
AEROSOL, POWDER ORAL
Qty: 1 INHALER | Refills: 12 | Status: SHIPPED | OUTPATIENT
Start: 2020-04-03 | End: 2020-04-07

## 2020-04-03 RX ORDER — ALBUTEROL SULFATE 90 UG/1
2 AEROSOL, METERED RESPIRATORY (INHALATION)
Qty: 8.5 INHALER | Refills: 12 | Status: SHIPPED | OUTPATIENT
Start: 2020-04-03 | End: 2020-09-09 | Stop reason: SDUPTHER

## 2020-04-07 ENCOUNTER — VIRTUAL VISIT (OUTPATIENT)
Dept: FAMILY MEDICINE CLINIC | Age: 70
End: 2020-04-07

## 2020-04-07 DIAGNOSIS — E78.00 PURE HYPERCHOLESTEROLEMIA: ICD-10-CM

## 2020-04-07 DIAGNOSIS — M05.79 RHEUMATOID ARTHRITIS INVOLVING MULTIPLE SITES WITH POSITIVE RHEUMATOID FACTOR (HCC): ICD-10-CM

## 2020-04-07 DIAGNOSIS — E11.21 CONTROLLED TYPE 2 DIABETES MELLITUS WITH DIABETIC NEPHROPATHY, WITHOUT LONG-TERM CURRENT USE OF INSULIN (HCC): ICD-10-CM

## 2020-04-07 DIAGNOSIS — R13.10 SWALLOWING PROBLEM: Primary | ICD-10-CM

## 2020-04-07 DIAGNOSIS — I10 ESSENTIAL HYPERTENSION: ICD-10-CM

## 2020-04-07 DIAGNOSIS — J30.1 NON-SEASONAL ALLERGIC RHINITIS DUE TO POLLEN: ICD-10-CM

## 2020-04-07 DIAGNOSIS — J43.9 PULMONARY EMPHYSEMA, UNSPECIFIED EMPHYSEMA TYPE (HCC): ICD-10-CM

## 2020-04-07 RX ORDER — SIMVASTATIN 20 MG/1
20 TABLET, FILM COATED ORAL
Qty: 90 TAB | Refills: 4 | Status: SHIPPED | OUTPATIENT
Start: 2020-04-07 | End: 2020-09-09 | Stop reason: SDUPTHER

## 2020-04-07 RX ORDER — CETIRIZINE HCL 10 MG
10 TABLET ORAL DAILY
Qty: 30 TAB | Refills: 2 | Status: SHIPPED | OUTPATIENT
Start: 2020-04-07 | End: 2020-09-01

## 2020-04-07 RX ORDER — HYDROCHLOROTHIAZIDE 25 MG/1
25 TABLET ORAL DAILY
Qty: 90 TAB | Refills: 4 | Status: SHIPPED | OUTPATIENT
Start: 2020-04-07 | End: 2020-09-09 | Stop reason: SDUPTHER

## 2020-04-07 RX ORDER — UMECLIDINIUM 62.5 UG/1
AEROSOL, POWDER ORAL
Qty: 3 INHALER | Refills: 3 | Status: SHIPPED | OUTPATIENT
Start: 2020-04-07 | End: 2020-09-09 | Stop reason: SDUPTHER

## 2020-04-07 NOTE — PROGRESS NOTES
Leonora Koehler is a 71 y.o.  female and has    Chief Complaint   Patient presents with    Diabetes    Hypertension    Arthritis    Asthma     Leonora Koehler is a 71 y.o. female evaluated via telephone on 4/7/2020. Consent:  She and/or health care decision maker is aware that that she may receive a bill for this telephone service, depending on her insurance coverage, and has provided verbal consent to proceed: Yes  Pt is at home, and I am at [facility]. Documentation:  I communicated with the patient and/or health care decision maker about swallowing difficulty. Details of this discussion including any medical advice provided: see below      I affirm this is a Patient Initiated Episode with an Established Patient who has not had a related appointment within my department in the past 7 days or scheduled within the next 24 hours. Total Time: minutes: 11-20 minutes    Note: not billable if this call serves to triage the patient into an appointment for the relevant concern      Duane Anders MD       Subjective:  She has difficulty swallowing with food getting lodged in her throat and the need to drink plenty of water. She has not trouble with liquid. She denies pain associated with swallowing.       Diabetes Mellitus:  She has diabetes mellitus, and  diabetes, hypertension, hyperlipidemia and obesity. Diabetic ROS - medication compliance: compliant all of the time, diabetic diet compliance: compliant most of the time.    Lab review: orders written for new lab studies as appropriate; see orders.      ROS   General ROS: negative for - chills, fatigue or fever  Psychological ROS: negative for - anxiety or depression  Ophthalmic ROS: positive for - uses glasses  ENT ROS: positive for - headaches, no nasal congestion or sore throat; she continues to clear her throat  Endocrine ROS: negative for - polydipsia/polyuria or temperature intolerance  Respiratory ROS: no cough, shortness of breath, or wheezing  Cardiovascular ROS: no chest pain or dyspnea on exertion  Gastrointestinal ROS: no abdominal pain, change in bowel habits, or black or bloody stools  Genito-Urinary ROS: no dysuria, trouble voiding, or hematuria  Musculoskeletal ROS: positive for - pain in toe - left  Neurological ROS: negative for - numbness/tingling  Dermatological ROS: positive for - skin lesion changes     All other systems reviewed and are negative. Objective:  Vitals:    04/07/20 0942   PainSc:   0 - No pain       Alert and oriented to person, place, and time  Mental status - normal mood, behavior, speech    LABS   hgb a1c 6.0    Assessment/Plan:    1. Swallowing problem  Assess for narrowing   - XR BA SWALLOW ESOPHOGRAM; Future    2. Non-seasonal allergic rhinitis due to pollen  Start antihistamine  - cetirizine (ZYRTEC) 10 mg tablet; Take 1 Tab by mouth daily. Dispense: 30 Tab; Refill: 2    3. Essential hypertension  Goal <130/80  - hydroCHLOROthiazide (HYDRODIURIL) 25 mg tablet; Take 1 Tab by mouth daily. Dispense: 90 Tab; Refill: 4    4. Controlled type 2 diabetes mellitus with diabetic nephropathy, without long-term current use of insulin ([facility])  Goal hgb a1c <7  - linaGLIPtin (TRADJENTA) 5 mg tablet; Take 1 Tab by mouth daily. Dispense: 90 Tab; Refill: 4    5. Rheumatoid arthritis involving multiple sites with positive rheumatoid factor ([facility])  Pain management    6. Pure hypercholesterolemia  Continue statin therapy  - simvastatin (Zocor) 20 mg tablet; Take 1 Tab by mouth nightly. Dispense: 90 Tab; Refill: 4    7. Pulmonary emphysema, unspecified emphysema type ([facility])  Inhaled therapy  - umeclidinium (Incruse Ellipta) 62.5 mcg/actuation inhaler; TAKE 1 PUFF BY MOUTH EVERY DAY  Dispense: 3 Inhaler; Refill: 3      Lab review: labs reviewed, I note that glycosylated hemoglobin mildly abnormal but acceptable      I have discussed the diagnosis with the patient and the intended plan as seen in the above orders.   I have discussed medication side effects and warnings with the patient as well. I have reviewed the plan of care with the patient, accepted their input and they are in agreement with the treatment goals.

## 2020-04-14 ENCOUNTER — TELEPHONE (OUTPATIENT)
Dept: FAMILY MEDICINE CLINIC | Age: 70
End: 2020-04-14

## 2020-04-14 NOTE — TELEPHONE ENCOUNTER
Pt is checking the status of her diabetic supply request. Pt explained that she saw Dr. Cynthia Ruiz on 4/7/2020 and was told that her diabetic testing supply (Lantus and testing strip)  will be sent to her pharmacy. Pt stated that her pharmacy haven't received it yet. Asking to be called back.

## 2020-04-16 NOTE — TELEPHONE ENCOUNTER
Pt is not taking lantus. I believe you mean lancets. Her diabetes is so well controlled that she does not need to monitor her blood glucose. Please ask her if she wants the glucometer and test strips.

## 2020-04-18 DIAGNOSIS — E11.21 TYPE 2 DIABETES MELLITUS WITH NEPHROPATHY (HCC): Primary | ICD-10-CM

## 2020-04-18 RX ORDER — BLOOD-GLUCOSE CONTROL, NORMAL
EACH MISCELLANEOUS
Qty: 100 LANCET | Refills: 3 | Status: SHIPPED | OUTPATIENT
Start: 2020-04-18 | End: 2022-01-12

## 2020-04-18 RX ORDER — BLOOD-GLUCOSE METER
EACH MISCELLANEOUS
Qty: 1 EACH | Refills: 0 | Status: SHIPPED | OUTPATIENT
Start: 2020-04-18

## 2020-05-28 ENCOUNTER — TELEPHONE (OUTPATIENT)
Dept: FAMILY MEDICINE CLINIC | Age: 70
End: 2020-05-28

## 2020-05-28 NOTE — TELEPHONE ENCOUNTER
Patient is asking letter for work to state that she has COPD and will need oxygen when wearing a mask. Pt also wants an order for an oxygen to be sent at [facility] [phone]. Pt stated that if appointment will be needed it will have to be a face to face visit. Asking to be called back.

## 2020-06-10 ENCOUNTER — OFFICE VISIT (OUTPATIENT)
Dept: FAMILY MEDICINE CLINIC | Age: 70
End: 2020-06-10

## 2020-06-10 ENCOUNTER — HOSPITAL ENCOUNTER (OUTPATIENT)
Dept: MAMMOGRAPHY | Age: 70
Discharge: HOME | End: 2020-06-10
Attending: FAMILY MEDICINE
Payer: MEDICARE

## 2020-06-10 ENCOUNTER — HOSPITAL ENCOUNTER (OUTPATIENT)
Dept: LAB | Age: 70
Discharge: HOME | End: 2020-06-10
Payer: MEDICARE

## 2020-06-10 VITALS
DIASTOLIC BLOOD PRESSURE: 74 MMHG | HEIGHT: 62 IN | RESPIRATION RATE: 16 BRPM | BODY MASS INDEX: 31.47 KG/M2 | HEART RATE: 76 BPM | WEIGHT: 171 LBS | TEMPERATURE: 97.4 F | OXYGEN SATURATION: 98 % | SYSTOLIC BLOOD PRESSURE: 145 MMHG

## 2020-06-10 DIAGNOSIS — E11.21 CONTROLLED TYPE 2 DIABETES MELLITUS WITH DIABETIC NEPHROPATHY, WITHOUT LONG-TERM CURRENT USE OF INSULIN (HCC): ICD-10-CM

## 2020-06-10 DIAGNOSIS — I10 ESSENTIAL HYPERTENSION: ICD-10-CM

## 2020-06-10 DIAGNOSIS — J30.1 NON-SEASONAL ALLERGIC RHINITIS DUE TO POLLEN: ICD-10-CM

## 2020-06-10 DIAGNOSIS — Z71.89 ADVANCE CARE PLANNING: ICD-10-CM

## 2020-06-10 DIAGNOSIS — Z23 NEED FOR VACCINATION FOR PNEUMOCOCCUS: ICD-10-CM

## 2020-06-10 DIAGNOSIS — J45.909 MILD ASTHMA WITHOUT COMPLICATION, UNSPECIFIED WHETHER PERSISTENT: ICD-10-CM

## 2020-06-10 DIAGNOSIS — Z12.31 VISIT FOR SCREENING MAMMOGRAM: ICD-10-CM

## 2020-06-10 DIAGNOSIS — Z00.00 MEDICARE ANNUAL WELLNESS VISIT, SUBSEQUENT: Primary | ICD-10-CM

## 2020-06-10 LAB
CHOLEST SERPL-MCNC: 148 MG/DL
CREAT UR-MCNC: 158 MG/DL (ref 30–125)
HDLC SERPL-MCNC: 50 MG/DL (ref 40–60)
HDLC SERPL: 3 {RATIO} (ref 0–5)
LDLC SERPL CALC-MCNC: 74 MG/DL (ref 0–100)
LIPID PROFILE,FLP: NORMAL
MICROALBUMIN UR-MCNC: 1.07 MG/DL (ref 0–3)
MICROALBUMIN/CREAT UR-RTO: 7 MG/G (ref 0–30)
TRIGL SERPL-MCNC: 120 MG/DL (ref ?–150)
VLDLC SERPL CALC-MCNC: 24 MG/DL

## 2020-06-10 PROCEDURE — 82043 UR ALBUMIN QUANTITATIVE: CPT

## 2020-06-10 PROCEDURE — 80061 LIPID PANEL: CPT

## 2020-06-10 PROCEDURE — 77063 BREAST TOMOSYNTHESIS BI: CPT

## 2020-06-10 PROCEDURE — 36415 COLL VENOUS BLD VENIPUNCTURE: CPT

## 2020-06-10 RX ORDER — MONTELUKAST SODIUM 10 MG/1
10 TABLET ORAL DAILY
Qty: 30 TAB | Refills: 11 | Status: SHIPPED | OUTPATIENT
Start: 2020-06-10 | End: 2020-09-09 | Stop reason: SDUPTHER

## 2020-06-10 NOTE — PROGRESS NOTES
Lubna Kinsey presents today for   Chief Complaint   Patient presents with    Diabetes    Hypertension    Arthritis    COPD       Is someone accompanying this pt? no    Is the patient using any DME equipment during OV? no    Depression Screening:  3 most recent PHQ Screens 2/11/2019   Little interest or pleasure in doing things Not at all   Feeling down, depressed, irritable, or hopeless Not at all   Total Score PHQ 2 0       Learning Assessment:  Learning Assessment 12/2/2013   PRIMARY LEARNER Patient   PRIMARY LANGUAGE ENGLISH   LEARNER PREFERENCE PRIMARY LISTENING   ANSWERED BY patient   RELATIONSHIP SELF       Abuse Screening:  Abuse Screening Questionnaire 9/10/2018   Do you ever feel afraid of your partner? N   Are you in a relationship with someone who physically or mentally threatens you? N   Is it safe for you to go home? Y       Fall Risk  Fall Risk Assessment, last 12 mths 11/5/2019   Able to walk? Yes   Fall in past 12 months? No       Health Maintenance reviewed and discussed and ordered per Provider. Health Maintenance Due   Topic Date Due    Shingrix Vaccine Age 49> (1 of 2) 08/23/2000    Pneumococcal 65+ years (2 of 2 - PPSV23) 10/24/2016    GLAUCOMA SCREENING Q2Y  01/22/2020    Eye Exam Retinal or Dilated  01/23/2020    MICROALBUMIN Q1  02/04/2020    Lipid Screen  02/04/2020    Medicare Yearly Exam  02/12/2020   . Coordination of Care:  1. Have you been to the ER, urgent care clinic since your last visit? Hospitalized since your last visit? no    2. Have you seen or consulted any other health care providers outside of the [address] [place] [address] since your last visit? Include any pap smears or colon screening. no      Last  Checked na  Last UDS Checked na  Last Pain contract signed: na    Patient presents in office today for routine care.   Patient concerns: requesting oxygen

## 2020-06-10 NOTE — PROGRESS NOTES
(AWV) The Initial Medicare Annual Wellness Exam PROGRESS NOTE    This is an Initial Medicare Annual Wellness Exam (AWV)     I have reviewed the patient's medical history in detail and updated the computerized patient record. Subha Lopez is a 71 y.o.  female and presents for an annual wellness exam     ROS   General ROS: negative for - chills, fatigue or fever  Psychological ROS: negative for - anxiety or depression  Ophthalmic ROS: positive for - uses glasses  ENT ROS: positive for - headaches, no nasal congestion or sore throat; she continues to clear her throat  Endocrine ROS: negative for - polydipsia/polyuria or temperature intolerance  Respiratory ROS: shortness of breath when she walks from her bed to the end of the bed; she uses albuterol with relief; she reports that wearing a mask is more irritating and interferes with her breathing  Cardiovascular ROS: positive for - dyspnea on exertion  negative for - chest pain  Gastrointestinal ROS: no abdominal pain, change in bowel habits, or black or bloody stools  Genito-Urinary ROS: no dysuria, trouble voiding, or hematuria  Musculoskeletal ROS: positive for - pain in toe - left  Neurological ROS: negative for - numbness/tingling  Dermatological ROS: positive for - skin lesion changes    All other systems reviewed and are negative.     History     Past Medical History:   Diagnosis Date    Advance directive in chart 7/11/2016    Asthma 12/2/2013    GERD (gastroesophageal reflux disease) 4/29/2011    HTN (hypertension) 4/29/2011    Rheumatoid arthritis(714.0) 4/29/2011    Type II or unspecified type diabetes mellitus without mention of complication, not stated as uncontrolled 4/29/2011      Past Surgical History:   Procedure Laterality Date    HX BREAST BIOPSY Right 1970's    rt benign    HX HYSTERECTOMY      HX NARCISO AND BSO  2004     Current Outpatient Medications   Medication Sig Dispense Refill    glucose blood VI test strips (True Metrix Glucose Test Strip) strip Test blood glucose once a day 100 Strip 3    lancets (True Comfort Lancet) 30 gauge misc Test blood glucose daily 100 Lancet 3    Blood-Glucose Meter (True Metrix Glucose Meter) misc Test blood glucose once a day 1 Each 0    cetirizine (ZYRTEC) 10 mg tablet Take 1 Tab by mouth daily. 30 Tab 2    simvastatin (Zocor) 20 mg tablet Take 1 Tab by mouth nightly. 90 Tab 4    linaGLIPtin (TRADJENTA) 5 mg tablet Take 1 Tab by mouth daily. 90 Tab 4    hydroCHLOROthiazide (HYDRODIURIL) 25 mg tablet Take 1 Tab by mouth daily. 90 Tab 4    umeclidinium (Incruse Ellipta) 62.5 mcg/actuation inhaler TAKE 1 PUFF BY MOUTH EVERY DAY 3 Inhaler 3    albuterol (PROVENTIL HFA, VENTOLIN HFA, PROAIR HFA) 90 mcg/actuation inhaler TAKE 2 PUFFS BY INHALATION EVERY SIX (6) HOURS AS NEEDED FOR WHEEZING OR SHORTNESS OF BREATH. 8.5 Inhaler 12    Symbicort 160-4.5 mcg/actuation HFAA TAKE 2 PUFFS BY INHALATION TWO (2) TIMES A DAY. 30.6 Inhaler 12    valsartan (DIOVAN) 80 mg tablet Take 1 Tab by mouth daily. 30 Tab 5    lidocaine HCl (XYLOCAINE) 3 % topical cream Apply  to affected area two (2) times a day. 453.6 g 1    omeprazole (PRILOSEC) 40 mg capsule Take 1 Cap by mouth two (2) times a day.  180 Cap 4     Allergies   Allergen Reactions    Percocet [Oxycodone-Acetaminophen] Nausea and Vomiting    Ultram [Tramadol] Swelling     Family History   Problem Relation Age of Onset    Hypertension Mother    [facility] Arthritis-rheumatoid Mother     Stroke Father      Social History     Tobacco Use    Smoking status: Never Smoker    Smokeless tobacco: Never Used   Substance Use Topics    Alcohol use: No     Patient Active Problem List   Diagnosis Code    GERD (gastroesophageal reflux disease) K21.9    HTN (hypertension) I10    Rheumatoid arthritis involving multiple sites with positive rheumatoid factor (HCC) M05.79    Pure hypercholesterolemia E78.00    Advance directive in chart Z78.9    Type 2 diabetes mellitus with nephropathy (HCC) E11.21    Pulmonary emphysema ([facility] [address] J43.9       Health Maintenance History  Immunizations reviewed, dtap up to date , pneumovax up to date, flu up to date, zoster due  Colonoscopy: due,   Eye exam: due  Mammo due  Dexascan due        Depression Risk Factor Screening:      Patient Health Questionnaire (PHQ-2)   Over the last 2 weeks, how often have you been bothered by any of the following problems? · Little interest or pleasure in doing things? · Not at all. [0]  · Feeling down, depressed, or hopeless? · Not at all. [0]    Total Score: 0/6  PHQ-2 Assessment Scoring:   A score of 2 or more requires further screening with the PHQ-9    Alcohol Risk Factor Screening:     Women: On any occasion during the past 3 months, have you had more than 3 drinks containing alcohol?  no   Do you average more than 7 drinks per week?  no    Functional Ability and Level of Safety:     Hearing Loss    Hearing is good. Activities of Daily Living   Self-care. Requires assistance with: no ADLs    Fall Risk   No fall risk factors    Abuse Screen   Patient is not abused    Examination   Physical Examination  Vitals:    06/10/20 1436   BP: 145/74   Pulse: 76   Resp: 16   Temp: 97.4 °F (36.3 °C)   TempSrc: Oral   SpO2: 98%   Weight: 171 lb (77.6 kg)   Height: 5' 2\" (1.575 m)   PainSc:   0 - No pain      Body mass index is 31.28 kg/m².      Evaluation of Cognitive Function:  Mood/affect:good mood with appropriate affect  Appearance: well kempt  Family member/caregiver input: n/a    alert, well appearing, and in no distress, oriented to person, place, and time and obese    Patient Care Team:  Sandor Peterson MD as PCP - General (Family Practice)  Sandor Peterson MD as PCP - [facility]  Ninfa Aguilera MD (Rheumatology)  Susana Jaimes MD (Internal Medicine)  Jessica Isidro MD (Gastroenterology)  Shaila Pretty MD (Neurology)  Carter Driscoll RN as Care Transitions Nurse    End-of-life planning  Advanced Directive in the case than an injury or illness causes the patient to be unable to make health care decisions    Health Care Directive or Living Will: yes, but she needs to update the information due to her daughter's death 2 years ago. Advice/Referrals/Counselling/Plan:   Education and counseling provided:  End-of-Life planning (with patient's consent)  Screening Mammography  Colorectal cancer screening tests  Medical nutrition therapy for individuals with diabetes or renal disease  Include in education list (weight loss, physical activity, smoking cessation, fall prevention, and nutrition)    ICD-10-CM ICD-9-CM    1. Medicare annual wellness visit, subsequent Z00.00 V70.0 [address]   2. Advance care planning Z71.89 V65.49 ADVANCE CARE PLANNING FIRST 30 MINS   3. Non-seasonal allergic rhinitis due to pollen J30.1 477.0 montelukast (SINGULAIR) 10 mg tablet   4. Essential hypertension I10 401.9    5. Controlled type 2 diabetes mellitus with diabetic nephropathy, without long-term current use of insulin (HCC) E11.21 250.40 LIPID PANEL     583.81 MICROALBUMIN, UR, RAND W/ MICROALB/CREAT RATIO   6. Mild asthma without complication, unspecified whether persistent J45.909 493.90 montelukast (SINGULAIR) 10 mg tablet   7. Need for vaccination for pneumococcus Z23 V03.82 PNEUMOCOCCAL POLYSACCHARIDE VACCINE, 23-VALENT, ADULT OR IMMUNOSUPPRESSED PT DOSE,      [place] IMMUNIZ ADMIN,1 SINGLE/COMB VAC/TOXOID      ADMIN PNEUMOCOCCAL VACCINE   . Brief written plan, checklist    I have discussed the diagnosis with the patient and the intended plan as seen in the above orders. The patient has received an after-visit summary and questions were answered concerning future plans. I have discussed medication side effects and warnings with the patient as well. I have reviewed the plan of care with the patient, accepted their input and they are in agreement with the treatment goals. ____________________________________________________________    Problem Assessment    for treatment of   Chief Complaint   Patient presents with    Diabetes    Hypertension    Arthritis    COPD         SUBJECTIVE  She c/o shortness of breath in the morning when she is walking along her bed. She has been evaluated by pulmonary medicine, dr. Ben Meng; she is requesting oxygen therapy for home. She reports that dr. Curtis Perez was not willing to prescribe this due to her tests and she is frustrated; she uses albuterol and gets relief; she is also using symbicort and ellipta  Diabetes Mellitus:  She has diabetes mellitus, and  diabetes, hypertension, hyperlipidemia and obesity. Diabetic ROS - medication compliance: compliant all of the time, diabetic diet compliance: compliant most of the time. Lab review: orders written for new lab studies as appropriate; see orders.     Visit Vitals  /74 (BP 1 Location: Right arm, BP Patient Position: Sitting)   Pulse 76   Temp 97.4 °F (36.3 °C) (Oral)   Resp 16   Ht 5' 2\" (1.575 m)   Wt 171 lb (77.6 kg)   SpO2 98%   BMI 31.28 kg/m²     General:  Alert, cooperative, no distress, appears stated age. Head:  Normocephalic, without obvious abnormality, atraumatic. Eyes:  Conjunctivae/corneas clear. PERRL, EOMs intact. Ears:  Normal TMs and external ear canals both ears. Nose: Nares normal. Septum midline. Mucosa normal. No drainage or sinus tenderness. Throat: Lips, mucosa, and tongue normal. Teeth and gums normal.   Neck: Supple, symmetrical, trachea midline, no adenopathy, thyroid: no enlargement/tenderness/nodules   Back:   Symmetric, no curvature. ROM normal.    Lungs:   Clear to auscultation bilaterally. Chest wall:  No tenderness or deformity. Heart:  Regular rate and rhythm, S1, S2 normal, no murmur, click, rub or gallop. Breast Exam:  No tenderness, masses, or nipple abnormality. Abdomen:   Soft, non-tender.  Bowel sounds normal. No masses, No organomegaly. Genitalia:  deferred   Rectal:  deferred   Extremities: Extremities normal, atraumatic, no cyanosis or edema. Pulses: 2+ and symmetric all extremities. Skin: Skin color, texture, turgor normal. No rashes or lesions. Lymph nodes: Cervical, supraclavicular, and axillary nodes normal.   Neurologic: CNII-XII intact. Normal strength, sensation and reflexes throughout. TESTS    Pt walked for 6 minutes and her oxygen saturation was 98 % - 100 %  Assessment/Plan:    1. Medicare annual wellness visit, subsequent  Reviewed preventive recommendations  - [address] [place] [address] [email or web address]    2. Advance care planning  See ACP  - ADVANCE CARE PLANNING FIRST 30 MINS    3. Non-seasonal allergic rhinitis due to pollen  Start adjunct for sneezing and shortness of breath  - montelukast (SINGULAIR) 10 mg tablet; Take 1 Tab by mouth daily. Dispense: 30 Tab; Refill: 11    4. Essential hypertension      5. Controlled type 2 diabetes mellitus with diabetic nephropathy, without long-term current use of insulin ([facility])  Goal hgb a1c <7  - LIPID PANEL; Future  - MICROALBUMIN, UR, RAND W/ MICROALB/CREAT RATIO; Future    6. Mild asthma without complication, unspecified whether persistent  Continue inhaled therapy  - montelukast (SINGULAIR) 10 mg tablet; Take 1 Tab by mouth daily. Dispense: 30 Tab; Refill: 11    7.  Need for vaccination for pneumococcus    - PNEUMOCOCCAL POLYSACCHARIDE VACCINE, 23-VALENT, ADULT OR IMMUNOSUPPRESSED PT DOSE,  - [place] IMMUNIZ ADMIN,1 SINGLE/COMB VAC/TOXOID  - ADMIN PNEUMOCOCCAL VACCINE      Lab review: orders written for new lab studies as appropriate; see orders

## 2020-06-10 NOTE — ACP (ADVANCE CARE PLANNING)
Advance Care Planning       Advance Care Planning (ACP) Physician/NP/PA (Provider) Conversation        Date of ACP Conversation: 6/10/2020    Conversation Conducted with:   Patient with Decision Making Candelaria Rayo Maker:    Current Designated Health Care Decision Maker:   (If there is a valid [address] named in the [address]" box in the ACP activity, but it is not visible above, be sure to open that field and then select the health care decision maker relationship (ie \"primary\") in the blank space to the right of the name.)    Note: Assess and validate information in current ACP documents, as indicated. If no Authorized Decision Maker has previously been identified, then patient chooses [address]:  \"Who would you like to name as your primary health care decision-maker? \"    Name: Gail Green  Relationship: chetna Phone number:    Adolfo Hatch this person be reached easily? \" YES  \"Who would you like to name as your back-up decision maker? \"     Note: If the relationship of these Decision-Makers to the patient does NOT follow your state's Next of Kin hierarchy, recommend that patient complete ACP document that meets state-specific requirements to allow them to act on the patient's behalf when appropriate. Care Preferences:    Hospitalization: \"If your health worsens and it becomes clear that your chance of recovery is unlikely, what would your preference be regarding hospitalization? \"  If the patient would want hospitalization, answer \"yes\". If the patient would prefer comfort-focused treatment without hospitalization, answer \"no\". yes      Ventilation: \"If you were in your present state of health and suddenly became very ill and were unable to breathe on your own, what would your preference be about the use of a ventilator (breathing machine) if it was available to you? \"    If patient would desire the use of a ventilator (breathing machine), answer \"yes\", if not answer \"no\":yes    \"If your health worsens and it becomes clear that your chance of recovery is unlikely, what would your preference be about the use of a ventilator (breathing machine) if it was available to you? \"   yes      Resuscitation:  \"CPR works best to restart the heart when there is a sudden event, like a heart attack, in someone who is otherwise healthy. Unfortunately, CPR does not typically restart the heart for people who have serious health conditions or who are very sick. \"    \"In the event your heart stopped as a result of an underlying serious health condition, would you want attempts to be made to restart your heart (answer \"yes\" for attempt to resuscitate) or would you prefer a natural death (answer \"no\" for do not attempt to resuscitate)? \"   yes    NOTE: If the patient has a valid advance directive AND provides care preference(s) that are inconsistent with that prior directive, advise the patient to consider either: creating a new advance directive that complies with state-specific requirements; or, if that is not possible, orally revoking that prior directive in accordance with state-specific requirements, which must be documented in the EHR.     Conversation Outcomes / Follow-Up Plan:   Recommended completion of Advance Directive      Length of Voluntary ACP Conversation in minutes:  16 minutes      Patricia Monge MD

## 2020-07-16 ENCOUNTER — TELEPHONE (OUTPATIENT)
Dept: FAMILY MEDICINE CLINIC | Age: 70
End: 2020-07-16

## 2020-07-16 DIAGNOSIS — R60.0 BILATERAL LOWER EXTREMITY EDEMA: Primary | ICD-10-CM

## 2020-07-16 RX ORDER — FUROSEMIDE 20 MG/1
20 TABLET ORAL DAILY
Qty: 30 TAB | Refills: 0 | Status: SHIPPED | OUTPATIENT
Start: 2020-07-16 | End: 2020-08-11

## 2020-07-16 NOTE — TELEPHONE ENCOUNTER
Pt called in and was wanting to let Dr. Saxena Current nurse know that both of her ankles are swelling and are puffy and she was wanting to know if Dr. Martha Huff would be able to put in a prescription to help the swelling go down. Please advise.

## 2020-08-10 DIAGNOSIS — R60.0 BILATERAL LOWER EXTREMITY EDEMA: ICD-10-CM

## 2020-08-11 RX ORDER — FUROSEMIDE 20 MG/1
TABLET ORAL
Qty: 30 TAB | Refills: 0 | Status: SHIPPED | OUTPATIENT
Start: 2020-08-11 | End: 2020-09-09 | Stop reason: SDUPTHER

## 2020-08-31 DIAGNOSIS — J30.1 NON-SEASONAL ALLERGIC RHINITIS DUE TO POLLEN: ICD-10-CM

## 2020-09-01 RX ORDER — CETIRIZINE HCL 10 MG
TABLET ORAL
Qty: 30 TAB | Refills: 2 | Status: SHIPPED | OUTPATIENT
Start: 2020-09-01 | End: 2020-09-09 | Stop reason: SDUPTHER

## 2020-09-09 ENCOUNTER — VIRTUAL VISIT (OUTPATIENT)
Dept: FAMILY MEDICINE CLINIC | Age: 70
End: 2020-09-09

## 2020-09-09 DIAGNOSIS — I10 ESSENTIAL HYPERTENSION: ICD-10-CM

## 2020-09-09 DIAGNOSIS — R60.0 BILATERAL LOWER EXTREMITY EDEMA: ICD-10-CM

## 2020-09-09 DIAGNOSIS — J30.1 NON-SEASONAL ALLERGIC RHINITIS DUE TO POLLEN: ICD-10-CM

## 2020-09-09 DIAGNOSIS — J43.9 PULMONARY EMPHYSEMA, UNSPECIFIED EMPHYSEMA TYPE (HCC): ICD-10-CM

## 2020-09-09 DIAGNOSIS — M05.79 RHEUMATOID ARTHRITIS INVOLVING MULTIPLE SITES WITH POSITIVE RHEUMATOID FACTOR (HCC): ICD-10-CM

## 2020-09-09 DIAGNOSIS — K21.00 GASTROESOPHAGEAL REFLUX DISEASE WITH ESOPHAGITIS: ICD-10-CM

## 2020-09-09 DIAGNOSIS — E78.00 PURE HYPERCHOLESTEROLEMIA: ICD-10-CM

## 2020-09-09 DIAGNOSIS — E11.21 CONTROLLED TYPE 2 DIABETES MELLITUS WITH DIABETIC NEPHROPATHY, WITHOUT LONG-TERM CURRENT USE OF INSULIN (HCC): ICD-10-CM

## 2020-09-09 DIAGNOSIS — E11.21 TYPE 2 DIABETES MELLITUS WITH NEPHROPATHY (HCC): ICD-10-CM

## 2020-09-09 RX ORDER — ALBUTEROL SULFATE 90 UG/1
2 AEROSOL, METERED RESPIRATORY (INHALATION)
Qty: 2 INHALER | Refills: 5 | Status: SHIPPED | OUTPATIENT
Start: 2020-09-09 | End: 2021-09-29 | Stop reason: SDUPTHER

## 2020-09-09 RX ORDER — SIMVASTATIN 20 MG/1
20 TABLET, FILM COATED ORAL
Qty: 90 TAB | Refills: 3 | Status: SHIPPED | OUTPATIENT
Start: 2020-09-09 | End: 2021-09-29 | Stop reason: SDUPTHER

## 2020-09-09 RX ORDER — MONTELUKAST SODIUM 10 MG/1
10 TABLET ORAL DAILY
Qty: 90 TAB | Refills: 3 | Status: SHIPPED | OUTPATIENT
Start: 2020-09-09 | End: 2021-09-29 | Stop reason: SDUPTHER

## 2020-09-09 RX ORDER — CETIRIZINE HCL 10 MG
TABLET ORAL
Qty: 90 TAB | Refills: 3 | Status: SHIPPED | OUTPATIENT
Start: 2020-09-09 | End: 2021-09-29 | Stop reason: SDUPTHER

## 2020-09-09 RX ORDER — VALSARTAN 80 MG/1
80 TABLET ORAL DAILY
Qty: 90 TAB | Refills: 3 | Status: SHIPPED | OUTPATIENT
Start: 2020-09-09 | End: 2021-09-29 | Stop reason: SDUPTHER

## 2020-09-09 RX ORDER — BUDESONIDE AND FORMOTEROL FUMARATE DIHYDRATE 160; 4.5 UG/1; UG/1
AEROSOL RESPIRATORY (INHALATION)
Qty: 3 INHALER | Refills: 3 | Status: SHIPPED | OUTPATIENT
Start: 2020-09-09 | End: 2021-09-24

## 2020-09-09 RX ORDER — UMECLIDINIUM 62.5 UG/1
AEROSOL, POWDER ORAL
Qty: 3 INHALER | Refills: 3 | Status: SHIPPED | OUTPATIENT
Start: 2020-09-09 | End: 2021-09-29 | Stop reason: SDUPTHER

## 2020-09-09 RX ORDER — HYDROCHLOROTHIAZIDE 25 MG/1
25 TABLET ORAL DAILY
Qty: 90 TAB | Refills: 3 | Status: SHIPPED | OUTPATIENT
Start: 2020-09-09 | End: 2021-09-29 | Stop reason: SDUPTHER

## 2020-09-09 RX ORDER — FUROSEMIDE 20 MG/1
TABLET ORAL
Qty: 90 TAB | Refills: 3 | Status: SHIPPED | OUTPATIENT
Start: 2020-09-09 | End: 2021-09-29 | Stop reason: SDUPTHER

## 2020-09-09 RX ORDER — OMEPRAZOLE 40 MG/1
40 CAPSULE, DELAYED RELEASE ORAL 2 TIMES DAILY
Qty: 180 CAP | Refills: 3 | Status: SHIPPED | OUTPATIENT
Start: 2020-09-09 | End: 2021-09-29 | Stop reason: SDUPTHER

## 2020-09-09 NOTE — PROGRESS NOTES
Room #  Virtual Doxy     Chief Complaint:    Lab review  Medication refill  HPI:    Paul Anand is a 79 y.o. female who presents today for c/o lab review and medication refill    1. Have you been to the ER, urgent care clinic since your last visit? Hospitalized since your last visit? NO When:    2. Have you seen or consulted any other health care providers outside of the [address] [place] [address] since your last visit? Include any pap smears or colon screening. NO  When :  Reason:    Last  Checked na  Last UDS Checked na  Last Pain contract signed: na    Consent:  She and/or health care decision maker is aware that that she may receive a bill for this telephone service, depending on her insurance coverage, and has provided verbal consent to proceed: Yes      Health Maintenance reviewed Yes    Health Maintenance Due   Topic Date Due    Shingrix Vaccine Age 49> (1 of 2) 08/23/2000    GLAUCOMA SCREENING Q2Y  01/22/2020    Eye Exam Retinal or Dilated  01/23/2020    Flu Vaccine (1) 09/01/2020     Depression Screening:  3 most recent PHQ Screens 9/9/2020   Little interest or pleasure in doing things Not at all   Feeling down, depressed, irritable, or hopeless Not at all   Total Score PHQ 2 0     Learning Assessment:  Learning Assessment 12/2/2013   PRIMARY LEARNER Patient   PRIMARY LANGUAGE ENGLISH   LEARNER PREFERENCE PRIMARY LISTENING   ANSWERED BY patient   RELATIONSHIP SELF     Abuse Screening:  Abuse Screening Questionnaire 9/10/2018   Do you ever feel afraid of your partner? N   Are you in a relationship with someone who physically or mentally threatens you? N   Is it safe for you to go home? Y     Fall Risk  Fall Risk Assessment, last 12 mths 11/5/2019   Able to walk? Yes   Fall in past 12 months?  No

## 2020-09-09 NOTE — PROGRESS NOTES
Kiera Peace is a 79 y.o. female, evaluated via audio-only technology on 9/9/2020 for No chief complaint on file. .    Assessment & Plan:   Diagnoses and all orders for this visit:    1. Non-seasonal allergic rhinitis due to pollen  -     cetirizine (ZYRTEC) 10 mg tablet; TAKE 1 TABLET BY MOUTH EVERY DAY  -     montelukast (SINGULAIR) 10 mg tablet; Take 1 Tab by mouth daily. 2. Bilateral lower extremity edema  -     furosemide (LASIX) 20 mg tablet; TAKE 1 TABLET BY MOUTH EVERY DAY    3. Pure hypercholesterolemia  -     simvastatin (Zocor) 20 mg tablet; Take 1 Tab by mouth nightly. 4. Controlled type 2 diabetes mellitus with diabetic nephropathy, without long-term current use of insulin ([facility])  -     linaGLIPtin (TRADJENTA) 5 mg tablet; Take 1 Tab by mouth daily. 5. Essential hypertension  -     hydroCHLOROthiazide (HYDRODIURIL) 25 mg tablet; Take 1 Tab by mouth daily. -     valsartan (DIOVAN) 80 mg tablet; Take 1 Tab by mouth daily. 6. Pulmonary emphysema, unspecified emphysema type (HCC)  -     umeclidinium (Incruse Ellipta) 62.5 mcg/actuation inhaler; TAKE 1 PUFF BY MOUTH EVERY DAY  -     budesonide-formoteroL (Symbicort) 160-4.5 mcg/actuation HFAA; TAKE 2 PUFFS BY INHALATION TWO (2) TIMES A DAY. 7. Gastroesophageal reflux disease with esophagitis  -     omeprazole (PriLOSEC) 40 mg capsule; Take 1 Cap by mouth two (2) times a day. 8. Rheumatoid arthritis involving multiple sites with positive rheumatoid factor ([facility] [address]    9. Type 2 diabetes mellitus with nephropathy ([facility])    Other orders  -     albuterol (PROVENTIL HFA, VENTOLIN HFA, PROAIR HFA) 90 mcg/actuation inhaler; Take 2 Puffs by inhalation every six (6) hours as needed for Wheezing or Shortness of Breath. 12  Subjective:   She has improved shortness of breath with inhaled medication; She has been evaluated by pulmonary medicine, dr. Paresh Duenas; she is not using oxygen therapy for home.   she uses albuterol and gets relief; she is also using symbicort and ellipta  Diabetes Mellitus:  She has diabetes mellitus, and  diabetes, hypertension, hyperlipidemia and obesity. Diabetic ROS - medication compliance: compliant all of the time, diabetic diet compliance: compliant most of the time. Lab review: orders written for new lab studies as appropriate; see orders.     Prior to Admission medications    Medication Sig Start Date End Date Taking? Authorizing Provider   cetirizine (ZYRTEC) 10 mg tablet TAKE 1 TABLET BY MOUTH EVERY DAY 9/9/20  Yes Khoi Moore MD   furosemide (LASIX) 20 mg tablet TAKE 1 TABLET BY MOUTH EVERY DAY 9/9/20  Yes Khoi Moore MD   montelukast (SINGULAIR) 10 mg tablet Take 1 Tab by mouth daily. 9/9/20  Yes Divina Carmichael MD   simvastatin (Zocor) 20 mg tablet Take 1 Tab by mouth nightly. 9/9/20  Yes Divina Carmichael MD   linaGLIPtin (TRADJENTA) 5 mg tablet Take 1 Tab by mouth daily. 9/9/20  Yes Divina Carmichael MD   hydroCHLOROthiazide (HYDRODIURIL) 25 mg tablet Take 1 Tab by mouth daily. 9/9/20  Yes Divina Carmichael MD   umeclidinium (Incruse Ellipta) 62.5 mcg/actuation inhaler TAKE 1 PUFF BY MOUTH EVERY DAY 9/9/20  Yes Khoi Moore MD   albuterol (PROVENTIL HFA, VENTOLIN HFA, PROAIR HFA) 90 mcg/actuation inhaler Take 2 Puffs by inhalation every six (6) hours as needed for Wheezing or Shortness of Breath. 9/9/20  Yes Divina Carmichael MD   budesonide-formoteroL (Symbicort) 160-4.5 mcg/actuation HFAA TAKE 2 PUFFS BY INHALATION TWO (2) TIMES A DAY. 9/9/20  Yes Divina Carmichael MD   valsartan (DIOVAN) 80 mg tablet Take 1 Tab by mouth daily. 9/9/20  Yes Divina Carmichael MD   omeprazole (PriLOSEC) 40 mg capsule Take 1 Cap by mouth two (2) times a day.  9/9/20  Yes Divina Carmichael MD   glucose blood VI test strips (True Metrix Glucose Test Strip) strip Test blood glucose once a day 4/18/20  Yes Khoi Moore MD   lancets (True Comfort Lancet) 30 gauge misc Test blood glucose daily 4/18/20  Yes Khoi Moore MD   Blood-Glucose Meter (True Metrix Glucose Meter) misc Test blood glucose once a day 4/18/20  Yes Prince Moore MD   lidocaine HCl (XYLOCAINE) 3 % topical cream Apply  to affected area two (2) times a day. 11/13/19  Yes Deanne Bello MD   cetirizine (ZYRTEC) 10 mg tablet TAKE 1 TABLET BY MOUTH EVERY DAY 9/1/20 9/9/20  Deanne Bello MD   furosemide (LASIX) 20 mg tablet TAKE 1 TABLET BY MOUTH EVERY DAY 8/11/20 9/9/20  Deanne Bello MD   montelukast (SINGULAIR) 10 mg tablet Take 1 Tab by mouth daily. 6/10/20 9/9/20  Deanne Bello MD   simvastatin (Zocor) 20 mg tablet Take 1 Tab by mouth nightly. 4/7/20 9/9/20  Deanne Bello MD   linaGLIPtin (TRADJENTA) 5 mg tablet Take 1 Tab by mouth daily. 4/7/20 9/9/20  Deanne Bello MD   hydroCHLOROthiazide (HYDRODIURIL) 25 mg tablet Take 1 Tab by mouth daily. 4/7/20 9/9/20  Deanne Bello MD   umeclidinium (Incruse Ellipta) 62.5 mcg/actuation inhaler TAKE 1 PUFF BY MOUTH EVERY DAY 4/7/20 9/9/20  Deanne Bello MD   albuterol (PROVENTIL HFA, VENTOLIN HFA, PROAIR HFA) 90 mcg/actuation inhaler TAKE 2 PUFFS BY INHALATION EVERY SIX (6) HOURS AS NEEDED FOR WHEEZING OR SHORTNESS OF BREATH. 4/3/20 9/9/20  Deanne Bello MD   Symbicort 160-4.5 mcg/actuation HFAA TAKE 2 PUFFS BY INHALATION TWO (2) TIMES A DAY. 4/3/20 9/9/20  Deanne Bello MD   valsartan (DIOVAN) 80 mg tablet Take 1 Tab by mouth daily. 3/31/20 9/9/20  Deanne Bello MD   omeprazole (PRILOSEC) 40 mg capsule Take 1 Cap by mouth two (2) times a day.  11/5/19 9/9/20  Deanne Bello MD       ROS  General ROS: negative for - chills, fatigue or fever  Psychological ROS: negative for - anxiety or depression  Ophthalmic ROS: positive for - uses glasses  ENT ROS: positive for - headaches, no nasal congestion or sore throat; she continues to clear her throat  Endocrine ROS: negative for - polydipsia/polyuria or temperature intolerance  Respiratory ROS: shortness of breath when she walks from her bed to the end of the bed; she uses albuterol with relief; she reports that wearing a mask is more irritating and interferes with her breathing  Cardiovascular ROS: positive for - dyspnea on exertion  negative for - chest pain  Gastrointestinal ROS: no abdominal pain, change in bowel habits, or black or bloody stools  Genito-Urinary ROS: no dysuria, trouble voiding, or hematuria  Musculoskeletal ROS: positive for - pain in toe - left  Neurological ROS: negative for - numbness/tingling  Dermatological ROS: positive for - skin lesion changes     No flowsheet data found. Elmer Ortiz, who was evaluated through a patient-initiated, synchronous (real-time) audio only encounter, and/or her healthcare decision maker, is aware that it is a billable service, with coverage as determined by her insurance carrier. She provided verbal consent to proceed: Yes. She has not had a related appointment within my department in the past 7 days or scheduled within the next 24 hours.       Total Time: minutes: 5-10 minutes    Fiordaliza Holman MD

## 2021-02-14 NOTE — TELEPHONE ENCOUNTER
[facility] is requesting an alternative medication for Spiriva resimat 2.5 mcg inh because rx is not covered     Patient requesting the following medication refill     Medication requesting Spiriva respimat 2.5 mcg    Date last prescribed 01/23/2018  QTY 3  Refills 12    Date patient last seen 01/23/2018    Follow up appt scheduled for 04/24/2018    Please advise No

## 2021-06-14 ENCOUNTER — HOSPITAL ENCOUNTER (OUTPATIENT)
Dept: WOMENS IMAGING | Age: 71
Discharge: HOME | End: 2021-06-14
Attending: FAMILY MEDICINE
Payer: MEDICARE

## 2021-06-14 DIAGNOSIS — Z12.31 ENCOUNTER FOR SCREENING MAMMOGRAM FOR BREAST CANCER: ICD-10-CM

## 2021-06-14 PROCEDURE — 77063 BREAST TOMOSYNTHESIS BI: CPT

## 2021-08-10 DIAGNOSIS — E11.21 TYPE 2 DIABETES MELLITUS WITH NEPHROPATHY (HCC): Primary | ICD-10-CM

## 2021-08-10 DIAGNOSIS — R60.0 BILATERAL LOWER EXTREMITY EDEMA: ICD-10-CM

## 2021-08-10 DIAGNOSIS — E78.00 PURE HYPERCHOLESTEROLEMIA: ICD-10-CM

## 2021-09-01 DIAGNOSIS — R60.0 BILATERAL LOWER EXTREMITY EDEMA: Primary | ICD-10-CM

## 2021-09-01 RX ORDER — POTASSIUM CHLORIDE 750 MG/1
10 TABLET, EXTENDED RELEASE ORAL DAILY
Qty: 90 TABLET | Refills: 3 | Status: SHIPPED | OUTPATIENT
Start: 2021-09-01 | End: 2021-12-07

## 2021-09-21 DIAGNOSIS — J43.9 PULMONARY EMPHYSEMA, UNSPECIFIED EMPHYSEMA TYPE (HCC): ICD-10-CM

## 2021-09-21 DIAGNOSIS — E11.21 CONTROLLED TYPE 2 DIABETES MELLITUS WITH DIABETIC NEPHROPATHY, WITHOUT LONG-TERM CURRENT USE OF INSULIN (HCC): ICD-10-CM

## 2021-09-24 RX ORDER — LINAGLIPTIN 5 MG/1
TABLET, FILM COATED ORAL
Qty: 90 TABLET | Refills: 3 | Status: SHIPPED | OUTPATIENT
Start: 2021-09-24 | End: 2022-06-14 | Stop reason: SDUPTHER

## 2021-09-24 RX ORDER — BUDESONIDE AND FORMOTEROL FUMARATE DIHYDRATE 160; 4.5 UG/1; UG/1
AEROSOL RESPIRATORY (INHALATION)
Qty: 30.6 EACH | Refills: 3 | Status: SHIPPED | OUTPATIENT
Start: 2021-09-24 | End: 2022-03-07 | Stop reason: SDUPTHER

## 2021-09-29 ENCOUNTER — OFFICE VISIT (OUTPATIENT)
Dept: FAMILY MEDICINE CLINIC | Age: 71
End: 2021-09-29
Payer: MEDICARE

## 2021-09-29 ENCOUNTER — HOSPITAL ENCOUNTER (OUTPATIENT)
Dept: LAB | Age: 71
Discharge: HOME | End: 2021-09-29
Payer: MEDICARE

## 2021-09-29 VITALS
BODY MASS INDEX: 32.94 KG/M2 | RESPIRATION RATE: 16 BRPM | HEART RATE: 76 BPM | SYSTOLIC BLOOD PRESSURE: 131 MMHG | TEMPERATURE: 97 F | OXYGEN SATURATION: 97 % | DIASTOLIC BLOOD PRESSURE: 70 MMHG | HEIGHT: 62 IN | WEIGHT: 179 LBS

## 2021-09-29 DIAGNOSIS — R60.0 BILATERAL LOWER EXTREMITY EDEMA: ICD-10-CM

## 2021-09-29 DIAGNOSIS — K21.00 GASTROESOPHAGEAL REFLUX DISEASE WITH ESOPHAGITIS: ICD-10-CM

## 2021-09-29 DIAGNOSIS — I10 ESSENTIAL HYPERTENSION: ICD-10-CM

## 2021-09-29 DIAGNOSIS — E11.21 TYPE 2 DIABETES MELLITUS WITH NEPHROPATHY (HCC): ICD-10-CM

## 2021-09-29 DIAGNOSIS — Z00.00 MEDICARE ANNUAL WELLNESS VISIT, INITIAL: Primary | ICD-10-CM

## 2021-09-29 DIAGNOSIS — J30.1 NON-SEASONAL ALLERGIC RHINITIS DUE TO POLLEN: ICD-10-CM

## 2021-09-29 DIAGNOSIS — Z28.21 REFUSED INFLUENZA VACCINE: ICD-10-CM

## 2021-09-29 DIAGNOSIS — E78.00 PURE HYPERCHOLESTEROLEMIA: ICD-10-CM

## 2021-09-29 DIAGNOSIS — E11.21 CONTROLLED TYPE 2 DIABETES MELLITUS WITH DIABETIC NEPHROPATHY, WITHOUT LONG-TERM CURRENT USE OF INSULIN (HCC): ICD-10-CM

## 2021-09-29 DIAGNOSIS — Z71.89 ADVANCE CARE PLANNING: ICD-10-CM

## 2021-09-29 DIAGNOSIS — M05.79 RHEUMATOID ARTHRITIS INVOLVING MULTIPLE SITES WITH POSITIVE RHEUMATOID FACTOR (HCC): ICD-10-CM

## 2021-09-29 DIAGNOSIS — J43.9 PULMONARY EMPHYSEMA, UNSPECIFIED EMPHYSEMA TYPE (HCC): ICD-10-CM

## 2021-09-29 LAB
ALBUMIN SERPL-MCNC: 3.9 G/DL (ref 3.4–5)
ALBUMIN/GLOB SERPL: 1.1 {RATIO} (ref 0.8–1.7)
ALP SERPL-CCNC: 104 U/L (ref 45–117)
ALT SERPL-CCNC: 21 U/L (ref 13–56)
ANION GAP SERPL CALC-SCNC: 2 MMOL/L (ref 3–18)
AST SERPL-CCNC: 18 U/L (ref 10–38)
BILIRUB SERPL-MCNC: 0.4 MG/DL (ref 0.2–1)
BUN SERPL-MCNC: 17 MG/DL (ref 7–18)
BUN/CREAT SERPL: 18 (ref 12–20)
CALCIUM SERPL-MCNC: 9.5 MG/DL (ref 8.5–10.1)
CHLORIDE SERPL-SCNC: 106 MMOL/L (ref 100–111)
CHOLEST SERPL-MCNC: 143 MG/DL
CO2 SERPL-SCNC: 33 MMOL/L (ref 21–32)
CREAT SERPL-MCNC: 0.92 MG/DL (ref 0.6–1.3)
CREAT UR-MCNC: 192 MG/DL (ref 30–125)
EST. AVERAGE GLUCOSE BLD GHB EST-MCNC: 146 MG/DL
GLOBULIN SER CALC-MCNC: 3.6 G/DL (ref 2–4)
GLUCOSE SERPL-MCNC: 114 MG/DL (ref 74–99)
HBA1C MFR BLD: 6.7 % (ref 4.2–5.6)
HDLC SERPL-MCNC: 47 MG/DL (ref 40–60)
HDLC SERPL: 3 {RATIO} (ref 0–5)
LDLC SERPL CALC-MCNC: 68.4 MG/DL (ref 0–100)
LIPID PROFILE,FLP: NORMAL
MICROALBUMIN UR-MCNC: 1.07 MG/DL (ref 0–3)
MICROALBUMIN/CREAT UR-RTO: 6 MG/G (ref 0–30)
POTASSIUM SERPL-SCNC: 3.3 MMOL/L (ref 3.5–5.5)
PROT SERPL-MCNC: 7.5 G/DL (ref 6.4–8.2)
SODIUM SERPL-SCNC: 141 MMOL/L (ref 136–145)
TRIGL SERPL-MCNC: 138 MG/DL (ref ?–150)
VLDLC SERPL CALC-MCNC: 27.6 MG/DL

## 2021-09-29 PROCEDURE — G9899 SCRN MAM PERF RSLTS DOC: HCPCS | Performed by: FAMILY MEDICINE

## 2021-09-29 PROCEDURE — G8536 NO DOC ELDER MAL SCRN: HCPCS | Performed by: FAMILY MEDICINE

## 2021-09-29 PROCEDURE — 80061 LIPID PANEL: CPT

## 2021-09-29 PROCEDURE — G9711 PT HX TOT COL OR COLON CA: HCPCS | Performed by: FAMILY MEDICINE

## 2021-09-29 PROCEDURE — 1090F PRES/ABSN URINE INCON ASSESS: CPT | Performed by: FAMILY MEDICINE

## 2021-09-29 PROCEDURE — G8754 DIAS BP LESS 90: HCPCS | Performed by: FAMILY MEDICINE

## 2021-09-29 PROCEDURE — 83036 HEMOGLOBIN GLYCOSYLATED A1C: CPT

## 2021-09-29 PROCEDURE — G0439 PPPS, SUBSEQ VISIT: HCPCS | Performed by: FAMILY MEDICINE

## 2021-09-29 PROCEDURE — 1101F PT FALLS ASSESS-DOCD LE1/YR: CPT | Performed by: FAMILY MEDICINE

## 2021-09-29 PROCEDURE — 82043 UR ALBUMIN QUANTITATIVE: CPT

## 2021-09-29 PROCEDURE — 99214 OFFICE O/P EST MOD 30 MIN: CPT | Performed by: FAMILY MEDICINE

## 2021-09-29 PROCEDURE — G8417 CALC BMI ABV UP PARAM F/U: HCPCS | Performed by: FAMILY MEDICINE

## 2021-09-29 PROCEDURE — 99497 ADVNCD CARE PLAN 30 MIN: CPT | Performed by: FAMILY MEDICINE

## 2021-09-29 PROCEDURE — G8399 PT W/DXA RESULTS DOCUMENT: HCPCS | Performed by: FAMILY MEDICINE

## 2021-09-29 PROCEDURE — 3046F HEMOGLOBIN A1C LEVEL >9.0%: CPT | Performed by: FAMILY MEDICINE

## 2021-09-29 PROCEDURE — 36415 COLL VENOUS BLD VENIPUNCTURE: CPT

## 2021-09-29 PROCEDURE — G8432 DEP SCR NOT DOC, RNG: HCPCS | Performed by: FAMILY MEDICINE

## 2021-09-29 PROCEDURE — G8752 SYS BP LESS 140: HCPCS | Performed by: FAMILY MEDICINE

## 2021-09-29 PROCEDURE — 80053 COMPREHEN METABOLIC PANEL: CPT

## 2021-09-29 PROCEDURE — G8427 DOCREV CUR MEDS BY ELIG CLIN: HCPCS | Performed by: FAMILY MEDICINE

## 2021-09-29 PROCEDURE — 2022F DILAT RTA XM EVC RTNOPTHY: CPT | Performed by: FAMILY MEDICINE

## 2021-09-29 RX ORDER — OMEPRAZOLE 40 MG/1
40 CAPSULE, DELAYED RELEASE ORAL 2 TIMES DAILY
Qty: 180 CAPSULE | Refills: 3 | Status: SHIPPED | OUTPATIENT
Start: 2021-09-29 | End: 2021-12-07 | Stop reason: SINTOL

## 2021-09-29 RX ORDER — UMECLIDINIUM 62.5 UG/1
AEROSOL, POWDER ORAL
Qty: 3 EACH | Refills: 3 | Status: SHIPPED | OUTPATIENT
Start: 2021-09-29 | End: 2022-03-07 | Stop reason: SDUPTHER

## 2021-09-29 RX ORDER — CETIRIZINE HCL 10 MG
TABLET ORAL
Qty: 90 TABLET | Refills: 3 | Status: SHIPPED | OUTPATIENT
Start: 2021-09-29 | End: 2022-06-14 | Stop reason: SDUPTHER

## 2021-09-29 RX ORDER — MONTELUKAST SODIUM 10 MG/1
10 TABLET ORAL DAILY
Qty: 90 TABLET | Refills: 3 | Status: SHIPPED | OUTPATIENT
Start: 2021-09-29 | End: 2022-06-14 | Stop reason: SDUPTHER

## 2021-09-29 RX ORDER — ALBUTEROL SULFATE 90 UG/1
2 AEROSOL, METERED RESPIRATORY (INHALATION)
Qty: 2 EACH | Refills: 2 | Status: SHIPPED | OUTPATIENT
Start: 2021-09-29 | End: 2022-06-14 | Stop reason: SDUPTHER

## 2021-09-29 RX ORDER — SIMVASTATIN 20 MG/1
20 TABLET, FILM COATED ORAL
Qty: 90 TABLET | Refills: 3 | Status: SHIPPED | OUTPATIENT
Start: 2021-09-29 | End: 2022-06-14 | Stop reason: SDUPTHER

## 2021-09-29 RX ORDER — HYDROCHLOROTHIAZIDE 25 MG/1
25 TABLET ORAL DAILY
Qty: 90 TABLET | Refills: 3 | Status: SHIPPED | OUTPATIENT
Start: 2021-09-29 | End: 2021-12-07 | Stop reason: ALTCHOICE

## 2021-09-29 RX ORDER — VALSARTAN 80 MG/1
80 TABLET ORAL DAILY
Qty: 90 TABLET | Refills: 3 | Status: SHIPPED | OUTPATIENT
Start: 2021-09-29 | End: 2022-06-14 | Stop reason: SDUPTHER

## 2021-09-29 RX ORDER — FUROSEMIDE 20 MG/1
TABLET ORAL
Qty: 90 TABLET | Refills: 3 | Status: SHIPPED | OUTPATIENT
Start: 2021-09-29 | End: 2021-12-07

## 2021-09-29 NOTE — PROGRESS NOTES
Michael Mercado presents today for   Chief Complaint   Patient presents with    Annual Wellness Visit       Is someone accompanying this pt? no    Is the patient using any DME equipment during OV? no    Depression Screening:  3 most recent PHQ Screens 9/29/2021   Little interest or pleasure in doing things Not at all   Feeling down, depressed, irritable, or hopeless Not at all   Total Score PHQ 2 0       Learning Assessment:  Learning Assessment 12/2/2013   PRIMARY LEARNER Patient   PRIMARY LANGUAGE ENGLISH   LEARNER PREFERENCE PRIMARY LISTENING   ANSWERED BY patient   RELATIONSHIP SELF       Abuse Screening:  Abuse Screening Questionnaire 9/9/2020   Do you ever feel afraid of your partner? N   Are you in a relationship with someone who physically or mentally threatens you? N   Is it safe for you to go home? Y       Fall Risk  Fall Risk Assessment, last 12 mths 9/29/2021   Able to walk? Yes   Fall in past 12 months? 0   Do you feel unsteady? 0   Are you worried about falling 0       Health Maintenance reviewed and discussed and ordered per Provider. Health Maintenance Due   Topic Date Due    COVID-19 Vaccine (1) Never done    Shingrix Vaccine Age 50> (1 of 2) Never done    Eye Exam Retinal or Dilated  01/23/2020    Foot Exam Q1  11/05/2020    A1C test (Diabetic or Prediabetic)  11/05/2020    MICROALBUMIN Q1  06/10/2021    Lipid Screen  06/10/2021    Medicare Yearly Exam  06/11/2021    Flu Vaccine (1) 09/01/2021   . Coordination of Care:  1. Have you been to the ER, urgent care clinic since your last visit? Hospitalized since your last visit? no    2. Have you seen or consulted any other health care providers outside of the [address] [place] [address] since your last visit? Include any pap smears or colon screening.  no      Last  Checked na  Last UDS Checked na  Last Pain contract signed: na    Annual Medicare Wellness Exam  Med refills

## 2021-09-29 NOTE — ACP (ADVANCE CARE PLANNING)
Advance Care Planning     Advance Care Planning (ACP) Physician/NP/PA Conversation      Date of Conversation: 9/29/2021  Conducted with: Patient with Decision Making Capacity    Healthcare Decision Maker:     Primary Decision Maker: Frances Nicolas here to complete [address] including selection of the [address] Relationship (ie \"Primary\")      Today we documented Decision Maker(s) consistent with Legal Next of Kin hierarchy. Care Preferences:    Hospitalization: \"If your health worsens and it becomes clear that your chance of recovery is unlikely, what would be your preference regarding hospitalization? \"  The patient would prefer hospitalization. Ventilation: \"If you were unable to breathe on your own and your chance of recovery was unlikely, what would be your preference about the use of a ventilator (breathing machine) if it was available to you? \"   The patient would desire the use of a ventilator. Resuscitation: \"In the event your heart stopped as a result of an underlying serious health condition, would you want attempts to be made to restart your heart, or would you prefer a natural death? \"   Yes, attempt to resuscitate.     Additional topics discussed: treatment goals, benefit/burden of treatment options, ventilation preferences, hospitalization preferences and resuscitation preferences    Conversation Outcomes / Follow-Up Plan:   ACP in process - information provided, considering goals and options  Reviewed DNR/DNI and patient elects Full Code (Attempt Resuscitation)     Length of Voluntary ACP Conversation in minutes:  16 minutes    Jammie Cassidy MD

## 2021-09-29 NOTE — PROGRESS NOTES
(AWV) The Initial Medicare Annual Wellness Exam PROGRESS NOTE    This is an Initial Medicare Annual Wellness Exam (AWV)    I have reviewed the patient's medical history in detail and updated the computerized patient record. Chinedu Monte is a 70 y.o.  female and presents for an annual wellness exam     ROS   General ROS: negative for - chills, fatigue or fever  Psychological ROS: negative for - anxiety or depression; insomnia with hypersomnolence during the day  Ophthalmic ROS: positive for - uses glasses  ENT ROS: positive for - headaches, no nasal congestion or sore throat; she continues to clear her throat  Endocrine ROS: negative for - polydipsia/polyuria or temperature intolerance  Respiratory ROS: shortness of breath when she walks from her bed to the end of the bed; she uses albuterol with relief; she reports that wearing a mask is more irritating and interferes with her breathing  Cardiovascular ROS: positive for - dyspnea on exertion  negative for - chest pain  Gastrointestinal ROS: no abdominal pain, change in bowel habits, or black or bloody stools  Genito-Urinary ROS: no dysuria, trouble voiding, or hematuria  Musculoskeletal ROS: positive for - pain in toe - left  Neurological ROS: negative for - numbness/tingling  Dermatological ROS: positive for - skin lesion changes    All other systems reviewed and are negative.     History     Past Medical History:   Diagnosis Date    Advance directive in chart 7/11/2016    Asthma 12/2/2013    GERD (gastroesophageal reflux disease) 4/29/2011    HTN (hypertension) 4/29/2011    Rheumatoid arthritis(714.0) 4/29/2011    Type II or unspecified type diabetes mellitus without mention of complication, not stated as uncontrolled 4/29/2011      Past Surgical History:   Procedure Laterality Date    HX BREAST BIOPSY Right 1970's    rt benign    HX HYSTERECTOMY      HX NARCISO AND BSO  2004     Current Outpatient Medications   Medication Sig Dispense Refill    budesonide-formoteroL (Symbicort) 160-4.5 mcg/actuation HFAA TAKE 2 PUFFS BY MOUTH TWO TIMES A DAY. 30.6 Each 3    Tradjenta 5 mg tablet TAKE 1 TABLET BY MOUTH EVERY DAY 90 Tablet 3    potassium chloride (KLOR-CON) 10 mEq tablet Take 1 Tablet by mouth daily. 90 Tablet 3    cetirizine (ZYRTEC) 10 mg tablet TAKE 1 TABLET BY MOUTH EVERY DAY 90 Tab 3    furosemide (LASIX) 20 mg tablet TAKE 1 TABLET BY MOUTH EVERY DAY 90 Tab 3    montelukast (SINGULAIR) 10 mg tablet Take 1 Tab by mouth daily. 90 Tab 3    simvastatin (Zocor) 20 mg tablet Take 1 Tab by mouth nightly. 90 Tab 3    hydroCHLOROthiazide (HYDRODIURIL) 25 mg tablet Take 1 Tab by mouth daily. 90 Tab 3    umeclidinium (Incruse Ellipta) 62.5 mcg/actuation inhaler TAKE 1 PUFF BY MOUTH EVERY DAY 3 Inhaler 3    albuterol (PROVENTIL HFA, VENTOLIN HFA, PROAIR HFA) 90 mcg/actuation inhaler Take 2 Puffs by inhalation every six (6) hours as needed for Wheezing or Shortness of Breath. 2 Inhaler 5    valsartan (DIOVAN) 80 mg tablet Take 1 Tab by mouth daily. 90 Tab 3    omeprazole (PriLOSEC) 40 mg capsule Take 1 Cap by mouth two (2) times a day. 180 Cap 3    glucose blood VI test strips (True Metrix Glucose Test Strip) strip Test blood glucose once a day 100 Strip 3    lancets (True Comfort Lancet) 30 gauge misc Test blood glucose daily 100 Lancet 3    Blood-Glucose Meter (True Metrix Glucose Meter) misc Test blood glucose once a day 1 Each 0    lidocaine HCl (XYLOCAINE) 3 % topical cream Apply  to affected area two (2) times a day.  453.6 g 1     Allergies   Allergen Reactions    Percocet [Oxycodone-Acetaminophen] Nausea and Vomiting    Ultram [Tramadol] Swelling     Family History   Problem Relation Age of Onset    Hypertension Mother    Clarisa Guillory Arthritis-rheumatoid Mother     Stroke Father      Social History     Tobacco Use    Smoking status: Never Smoker    Smokeless tobacco: Never Used   Substance Use Topics    Alcohol use: No     Patient Active Problem List   Diagnosis Code    GERD (gastroesophageal reflux disease) K21.9    HTN (hypertension) I10    Rheumatoid arthritis involving multiple sites with positive rheumatoid factor (HCC) M05.79    Pure hypercholesterolemia E78.00    Advance directive in chart Z78.9    Type 2 diabetes mellitus with nephropathy ([facility] E11.21    Pul[facility] emph[facility]ma [facility] J43.9       Health Maintenance History  Immunizations reviewed, dtap up to date , pneumovax up to date, flu refused, zoster due  Colonoscopy: up to date,   Eye exam: due  Mammo up to date  Dexascan up to date  covid up to date    Depression Risk Factor Screening:      Patient Health Questionnaire (PHQ-2)   Over the last 2 weeks, how often have you been bothered by any of the following problems? · Little interest or pleasure in doing things? · Not at all. [0]  · Feeling down, depressed, or hopeless? · Not at all. [0]    Total Score: 0/6  PHQ-2 Assessment Scoring:   A score of 2 or more requires further screening with the PHQ-9    Alcohol Risk Factor Screening:     Women: On any occasion during the past 3 months, have you had more than 3 drinks containing alcohol?  no   Do you average more than 7 drinks per week?  no    Functional Ability and Level of Safety:     Hearing Loss    Hearing is good. Activities of Daily Living   Self-care. Requires assistance with: no ADLs    Fall Risk   No fall risk factors    Abuse Screen   Patient is not abused    Examination   Physical Examination  Vitals:    09/29/21 1027   BP: 131/70   Pulse: 76   Resp: 16   Temp: 97 °F (36.1 °C)   TempSrc: Temporal   SpO2: 97%   Weight: 179 lb (81.2 kg)   Height: 5' 2\" (1.575 m)   PainSc:   0 - No pain      Body mass index is 32.74 kg/m².      Evaluation of Cognitive Function:  Mood/affect:good mood with appropriate affect  Appearance: well kempt  Family member/caregiver input: n/a    alert, well appearing, and in no distress, oriented to person, place, and time and morbidly obese    Patient Care Team:  Kathleen Abrams MD as PCP - General (Family Medicine)  Kathleen Abrams MD as PCP - [address] [place] [address]  [facility] Provider  Yvette Mendoza MD (Rheumatology)  Evin Restrepo MD (Internal Medicine)  George Escobedo MD (Gastroenterology)  John Malhotra MD (Neurology)  Shavon Watson, RN as Care Transitions Nurse  Joanna Alonso, RN as Ambulatory Care Manager    End-of-life planning  Advanced Directive in the case than an injury or illness causes the patient to be unable to make health care decisions    Health Care Directive or Living Will: yes    Advice/Referrals/Counselling/Plan:   Education and counseling provided:  End-of-Life planning (with patient's consent)  Influenza Vaccine  Medical nutrition therapy for individuals with diabetes or renal disease  covid vaccine  Include in education list (weight loss, physical activity, smoking cessation, fall prevention, and nutrition)    ICD-10-CM ICD-9-CM    1. Medicare annual wellness visit, initial  Z00.00 V70.0 [address]   2. Rheumatoid arthritis involving multiple sites with positive rheumatoid factor ([facility])  M05.79 714.0    3. Pulmonary emphysema, unspecified emphysema type ([facility])  J43.9 492.8 umeclidinium (Incruse Ellipta) 62.5 mcg/actuation inhaler   4. Controlled type 2 diabetes mellitus with diabetic nephropathy, without long-term current use of insulin ([facility])  E11.21 250.40  DIABETES FOOT EXAM     583.81    5. Advance care planning  Z71.89 V65.49 ADVANCE CARE PLANNING FIRST 30 MINS   6. Refused influenza vaccine  Z28.21 V64.06    7. Non-seasonal allergic rhinitis due to pollen  J30.1 477.0 cetirizine (ZYRTEC) 10 mg tablet      montelukast (SINGULAIR) 10 mg tablet   8. Bilateral lower extremity edema  R60.0 782.3 furosemide (LASIX) 20 mg tablet   9. Pure hypercholesterolemia  E78.00 272.0 simvastatin (Zocor) 20 mg tablet   10.  Essential hypertension  I10 401.9 hydroCHLOROthiazide (HYDRODIURIL) 25 mg tablet      valsartan (DIOVAN) 80 mg tablet   11. Gastroesophageal reflux disease with esophagitis  K21.00 530.11 omeprazole (PriLOSEC) 40 mg capsule   . Brief written plan, checklist    I have discussed the diagnosis with the patient and the intended plan as seen in the above orders. The patient has received an after-visit summary and questions were answered concerning future plans. I have discussed medication side effects and warnings with the patient as well. I have reviewed the plan of care with the patient, accepted their input and they are in agreement with the treatment goals. ____________________________________________________________    Problem Assessment    for treatment of   Chief Complaint   Patient presents with    Annual Wellness Visit         SUBJECTIVE    Well Adult Physical   Patient here for a comprehensive physical exam.The patient reports problems - feet swelling X a few months; she avoids salt except once a week, better in the morning then progresses; improves with elevation  Do you take any herbs or supplements that were not prescribed by a doctor? no Are you taking calcium supplements? no Are you taking aspirin daily? not applicable    She has improved shortness of breath with inhaled medication;  She has been evaluated by pulmonary medicine, dr. Payam Calle; she is not using oxygen therapy for home. she uses albuterol and gets relief; she is also using symbicort and ellipta  Diabetes Mellitus:  She has diabetes mellitus, and  diabetes, hypertension, hyperlipidemia and obesity. Diabetic ROS - medication compliance: compliant all of the time, diabetic diet compliance: compliant most of the time.    Lab review: orders written for new lab studies as appropriate; see orders.       Visit Vitals  /70 (BP 1 Location: Right arm, BP Patient Position: Sitting, BP Cuff Size: Adult)   Pulse 76   Temp 97 °F (36.1 °C) (Temporal)   Resp 16   Ht 5' 2\" (1.575 m)   Wt 179 lb (81.2 kg)   SpO2 97%   BMI 32.74 kg/m² General:  Alert, cooperative, no distress, appears stated age. Head:  Normocephalic, without obvious abnormality, atraumatic. Eyes:  Conjunctivae/corneas clear. PERRL, EOMs intact. Ears:  Normal TMs and external ear canals both ears. Nose: Nares normal. Septum midline. Mucosa normal. No drainage or sinus tenderness. Throat: Lips, mucosa, and tongue normal. Teeth and gums normal.   Neck: Supple, symmetrical, trachea midline, no adenopathy, thyroid: no enlargement/tenderness/nodulest and no JVD. Back:   Symmetric, no curvature. ROM normal. No CVA tenderness. Lungs:   Clear to auscultation bilaterally. Chest wall:  No tenderness or deformity. Heart:  Regular rate and rhythm, S1, S2 normal, no murmur, click, rub or gallop. Breast Exam:  Not examined   Abdomen:   Soft, non-tender. Bowel sounds normal. No masses,  No organomegaly. Genitalia:  deferred   Rectal:  deferred   Extremities: Extremities normal, atraumatic, no cyanosis; 1 +edema. Pulses: 2+ and symmetric all extremities. Skin: Skin color, texture, turgor normal. No rashes or lesions. Lymph nodes: Cervical, supraclavicular, and axillary nodes normal.   Neurologic: CNII-XII intact. Normal strength, sensation and reflexes throughout. Diabetic foot exam:     Left Foot:   Visual Exam: normal   With edema   Pulse DP: 2+ (normal)   Filament test: normal sensation      Right Foot:   Visual Exam: edema   Pulse DP: 2+ (normal)   Filament test: normal sensation     LABS     TESTS      Assessment/Plan:    1. Rheumatoid arthritis involving multiple sites with positive rheumatoid factor (HCC)  Continue pain management    2. Pulmonary emphysema, unspecified emphysema type ([address]  Continue inhaled therapy  - umeclidinium (Incruse Ellipta) 62.5 mcg/actuation inhaler; TAKE 1 PUFF BY MOUTH EVERY DAY  Dispense: 3 Each; Refill: 3    3.  Controlled type 2 diabetes mellitus with diabetic nephropathy, without long-term current use of insulin ([facility] [address]  Goal hgb a1c <7; needs assessment  -  DIABETES FOOT EXAM    4. Medicare annual wellness visit, initial  Reviewed preventive recommendations  - [address] [place] [address] [employer]    5. Advance care planning  See ACP  - ADVANCE CARE PLANNING FIRST 30 MINS    6. Refused influenza vaccine      7. Non-seasonal allergic rhinitis due to pollen  Continue antihistamine  - cetirizine (ZYRTEC) 10 mg tablet; TAKE 1 TABLET BY MOUTH EVERY DAY  Dispense: 90 Tablet; Refill: 3  - montelukast (SINGULAIR) 10 mg tablet; Take 1 Tablet by mouth daily. Dispense: 90 Tablet; Refill: 3    8. Bilateral lower extremity edema  Increase lasix dose to 40 mg  - furosemide (LASIX) 20 mg tablet; TAKE 1 TABLET BY MOUTH EVERY DAY  Dispense: 90 Tablet; Refill: 3    9. Pure hypercholesterolemia  Assess efficacy of statin therapy  - simvastatin (Zocor) 20 mg tablet; Take 1 Tablet by mouth nightly. Dispense: 90 Tablet; Refill: 3    10. Essential hypertension  Goal <130/80  - hydroCHLOROthiazide (HYDRODIURIL) 25 mg tablet; Take 1 Tablet by mouth daily. Dispense: 90 Tablet; Refill: 3  - valsartan (DIOVAN) 80 mg tablet; Take 1 Tablet by mouth daily. Dispense: 90 Tablet; Refill: 3    11. Gastroesophageal reflux disease with esophagitis    - omeprazole (PriLOSEC) 40 mg capsule; Take 1 Capsule by mouth two (2) times a day. Dispense: 180 Capsule;  Refill: 3      .qmbmi    Lab review: orders written for new lab studies as appropriate; see orders

## 2021-12-07 ENCOUNTER — OFFICE VISIT (OUTPATIENT)
Dept: FAMILY MEDICINE CLINIC | Age: 71
End: 2021-12-07

## 2021-12-07 ENCOUNTER — HOSPITAL ENCOUNTER (OUTPATIENT)
Dept: LAB | Age: 71
Discharge: HOME | End: 2021-12-07
Payer: MEDICARE

## 2021-12-07 VITALS
WEIGHT: 184 LBS | BODY MASS INDEX: 33.86 KG/M2 | HEART RATE: 80 BPM | OXYGEN SATURATION: 98 % | TEMPERATURE: 97.4 F | HEIGHT: 62 IN | SYSTOLIC BLOOD PRESSURE: 138 MMHG | RESPIRATION RATE: 16 BRPM | DIASTOLIC BLOOD PRESSURE: 70 MMHG

## 2021-12-07 DIAGNOSIS — E87.6 HYPOKALEMIA: ICD-10-CM

## 2021-12-07 DIAGNOSIS — I10 ESSENTIAL HYPERTENSION: ICD-10-CM

## 2021-12-07 DIAGNOSIS — R60.0 BILATERAL LOWER EXTREMITY EDEMA: ICD-10-CM

## 2021-12-07 DIAGNOSIS — E11.21 CONTROLLED TYPE 2 DIABETES MELLITUS WITH DIABETIC NEPHROPATHY, WITHOUT LONG-TERM CURRENT USE OF INSULIN (HCC): Primary | ICD-10-CM

## 2021-12-07 DIAGNOSIS — E78.00 PURE HYPERCHOLESTEROLEMIA: ICD-10-CM

## 2021-12-07 LAB
ANION GAP SERPL CALC-SCNC: 4 MMOL/L (ref 3–18)
BUN SERPL-MCNC: 14 MG/DL (ref 7–18)
BUN/CREAT SERPL: 16 (ref 12–20)
CALCIUM SERPL-MCNC: 9.2 MG/DL (ref 8.5–10.1)
CHLORIDE SERPL-SCNC: 106 MMOL/L (ref 100–111)
CO2 SERPL-SCNC: 31 MMOL/L (ref 21–32)
CREAT SERPL-MCNC: 0.89 MG/DL (ref 0.6–1.3)
GLUCOSE SERPL-MCNC: 91 MG/DL (ref 74–99)
POTASSIUM SERPL-SCNC: 3.8 MMOL/L (ref 3.5–5.5)
SODIUM SERPL-SCNC: 141 MMOL/L (ref 136–145)

## 2021-12-07 PROCEDURE — G8417 CALC BMI ABV UP PARAM F/U: HCPCS | Performed by: FAMILY MEDICINE

## 2021-12-07 PROCEDURE — G8754 DIAS BP LESS 90: HCPCS | Performed by: FAMILY MEDICINE

## 2021-12-07 PROCEDURE — 99214 OFFICE O/P EST MOD 30 MIN: CPT | Performed by: FAMILY MEDICINE

## 2021-12-07 PROCEDURE — G9899 SCRN MAM PERF RSLTS DOC: HCPCS | Performed by: FAMILY MEDICINE

## 2021-12-07 PROCEDURE — 80048 BASIC METABOLIC PNL TOTAL CA: CPT

## 2021-12-07 PROCEDURE — 1090F PRES/ABSN URINE INCON ASSESS: CPT | Performed by: FAMILY MEDICINE

## 2021-12-07 PROCEDURE — G9711 PT HX TOT COL OR COLON CA: HCPCS | Performed by: FAMILY MEDICINE

## 2021-12-07 PROCEDURE — G8536 NO DOC ELDER MAL SCRN: HCPCS | Performed by: FAMILY MEDICINE

## 2021-12-07 PROCEDURE — G8399 PT W/DXA RESULTS DOCUMENT: HCPCS | Performed by: FAMILY MEDICINE

## 2021-12-07 PROCEDURE — G8427 DOCREV CUR MEDS BY ELIG CLIN: HCPCS | Performed by: FAMILY MEDICINE

## 2021-12-07 PROCEDURE — 36415 COLL VENOUS BLD VENIPUNCTURE: CPT

## 2021-12-07 PROCEDURE — G8752 SYS BP LESS 140: HCPCS | Performed by: FAMILY MEDICINE

## 2021-12-07 PROCEDURE — 1101F PT FALLS ASSESS-DOCD LE1/YR: CPT | Performed by: FAMILY MEDICINE

## 2021-12-07 PROCEDURE — G8510 SCR DEP NEG, NO PLAN REQD: HCPCS | Performed by: FAMILY MEDICINE

## 2021-12-07 PROCEDURE — 2022F DILAT RTA XM EVC RTNOPTHY: CPT | Performed by: FAMILY MEDICINE

## 2021-12-07 PROCEDURE — 3044F HG A1C LEVEL LT 7.0%: CPT | Performed by: FAMILY MEDICINE

## 2021-12-07 RX ORDER — POTASSIUM CHLORIDE 750 MG/1
10 TABLET, EXTENDED RELEASE ORAL 2 TIMES DAILY
Qty: 180 TABLET | Refills: 3 | Status: SHIPPED | OUTPATIENT
Start: 2021-12-07 | End: 2022-06-14 | Stop reason: SDUPTHER

## 2021-12-07 RX ORDER — FUROSEMIDE 20 MG/1
20 TABLET ORAL 2 TIMES DAILY
Qty: 180 TABLET | Refills: 3 | Status: SHIPPED | OUTPATIENT
Start: 2021-12-07 | End: 2022-06-14

## 2021-12-07 NOTE — PROGRESS NOTES
Lubna Kinsey is a 70 y.o.  female and presents with    Chief Complaint   Patient presents with    Peripheral Edema    COPD    Diabetes    Hypertension       Subjective:  Edema  Patient complains of edema. The location of the edema is lower leg(s) bilateral.  The edema has been severe. Onset of symptoms was several months ago, gradually worsening since that time. The edema is present all day. The patient states the problem is long-standing. The swelling has been aggravated by dependency of involved area, relieved by diuretics, elevation of involved area, and been associated with weight gain. Cardiac risk factors include dyslipidemia, diabetes mellitus, obesity, sedentary life style, hypertension, post-menopausal.  She is not wearing compression stockings;     COPD Review:  She has improved shortness of breath with inhaled medication; she is not using oxygen therapy for home.  she uses albuterol and gets relief; she is also using symbicort and ellipta    Diabetes Mellitus:  She has diabetes mellitus, and  diabetes, hypertension, hyperlipidemia and obesity. Diabetic ROS - medication compliance: compliant all of the time, diabetic diet compliance: compliant most of the time.    Lab review: orders written for new lab studies as appropriate; see orders.      ROS   General ROS: negative for - chills, fatigue or fever  Psychological ROS: negative for - anxiety or depression; insomnia with hypersomnolence during the day  Ophthalmic ROS: positive for - uses glasses  ENT ROS: positive for - headaches, no nasal congestion or sore throat; she continues to clear her throat  Endocrine ROS: negative for - polydipsia/polyuria or temperature intolerance  Respiratory ROS: improved shortness of breath when she walks from her bed to the end of the bed; she uses albuterol with relief; she reports that wearing a mask is more irritating and interferes with her breathing  Cardiovascular ROS: positive for -[employer] on exertion  negative for - chest pain  Gastrointestinal ROS: no abdominal pain, change in bowel habits, or black or bloody stools  Genito-Urinary ROS: no dysuria, trouble voiding, or hematuria  Musculoskeletal ROS: positive for - pain in toe - left  Neurological ROS: negative for - numbness/tingling  Dermatological ROS: positive for - skin lesion changes    All other systems reviewed and are negative. Objective:  Vitals:    12/07/21 1003 12/07/21 1103   BP: (!) 165/81 138/70   Pulse: 80    Resp: 16    Temp: 97.4 °F (36.3 °C)    TempSrc: Temporal    SpO2: 98%    Weight: 184 lb (83.5 kg)    Height: 5' 2\" (1.575 m)    PainSc:   0 - No pain        alert, well appearing, and in no distress, oriented to person, place, and time and obese  Mental status - normal mood, behavior, speech, dress, motor activity, and thought processes  Chest - clear to auscultation, no wheezes, rales or rhonchi, symmetric air entry  Heart - normal rate, regular rhythm, normal S1, S2, no murmurs, rubs, clicks or gallops  Extremities - pedal edema 2 +  Neuro - CN II -XII intact; normal sensory and motor    LABS   K 3.3  TESTS      Assessment/Plan:    1. Bilateral lower extremity edema  D/c hctz for potent loop diuretic; take lasix twice a day  - furosemide (LASIX) 20 mg tablet; Take 1 Tablet by mouth two (2) times a day. TAKE 1 TABLET BY MOUTH EVERY DAY  Dispense: 180 Tablet; Refill: 3  - METABOLIC PANEL, BASIC; Future  - potassium chloride (KLOR-CON M10) 10 mEq tablet; Take 1 Tablet by mouth two (2) times a day. Dispense: 180 Tablet; Refill: 3    2. Controlled type 2 diabetes mellitus with diabetic nephropathy, without long-term current use of insulin ([facility])  Goal hgb a1c <7; well controlled; continue treatment    3. Pure hypercholesterolemia  Continue statin therapy; well controlled    4. Essential hypertension  Goal <130/80; assess for electrolyte balance  - METABOLIC PANEL, BASIC; Future    5.  Hypokalemia  increase frequency of potassium  - METABOLIC PANEL, BASIC; Future      Lab review: orders written for new lab studies as appropriate; see orders      I have discussed the diagnosis with the patient and the intended plan as seen in the above orders. The patient has received an after-visit summary and questions were answered concerning future plans. I have discussed medication side effects and warnings with the patient as well. I have reviewed the plan of care with the patient, accepted their input and they are in agreement with the treatment goals.

## 2021-12-07 NOTE — PROGRESS NOTES
Layla Werner presents today for   Chief Complaint   Patient presents with    Arthritis    COPD    Diabetes    Hypertension       Is someone accompanying this pt? no    Is the patient using any DME equipment during OV? no    Depression Screening:  3 most recent PHQ Screens 12/7/2021   Little interest or pleasure in doing things Not at all   Feeling down, depressed, irritable, or hopeless Not at all   Total Score PHQ 2 0       Learning Assessment:  Learning Assessment 12/2/2013   PRIMARY LEARNER Patient   PRIMARY LANGUAGE ENGLISH   LEARNER PREFERENCE PRIMARY LISTENING   ANSWERED BY patient   RELATIONSHIP SELF       Abuse Screening:  Abuse Screening Questionnaire 9/9/2020   Do you ever feel afraid of your partner? N   Are you in a relationship with someone who physically or mentally threatens you? N   Is it safe for you to go home? Y       Fall Risk  Fall Risk Assessment, last 12 mths 12/7/2021   Able to walk? Yes   Fall in past 12 months? 0   Do you feel unsteady? 0   Are you worried about falling 0       Health Maintenance reviewed and discussed and ordered per Provider. Health Maintenance Due   Topic Date Due    COVID-19 Vaccine (1) Never done    Shingrix Vaccine Age 50> (1 of 2) Never done    Eye Exam Retinal or Dilated  01/23/2020    Flu Vaccine (1) 09/01/2021    DTaP/Tdap/Td series (2 - Td or Tdap) 10/24/2021   . Coordination of Care:  1. Have you been to the ER, urgent care clinic since your last visit? Hospitalized since your last visit? no    2. Have you seen or consulted any other health care providers outside of the [address] [place] [address] since your last visit? Include any pap smears or colon screening. no      Last  Checked na  Last UDS Checked na  Last Pain contract signed: na    Patient presents in office today for routine care.   Patient concerns: feet and ankles are swollen

## 2022-01-12 DIAGNOSIS — E11.21 TYPE 2 DIABETES MELLITUS WITH NEPHROPATHY (HCC): ICD-10-CM

## 2022-01-12 RX ORDER — CALCIUM CITRATE/VITAMIN D3 200MG-6.25
TABLET ORAL
Qty: 100 STRIP | Refills: 3 | Status: SHIPPED | OUTPATIENT
Start: 2022-01-12 | End: 2022-06-14 | Stop reason: SDUPTHER

## 2022-01-12 RX ORDER — BLOOD-GLUCOSE CONTROL, NORMAL
EACH MISCELLANEOUS
Qty: 100 EACH | Refills: 3 | Status: SHIPPED | OUTPATIENT
Start: 2022-01-12 | End: 2022-06-14 | Stop reason: SDUPTHER

## 2022-03-07 ENCOUNTER — OFFICE VISIT (OUTPATIENT)
Dept: FAMILY MEDICINE CLINIC | Age: 72
End: 2022-03-07
Payer: MEDICARE

## 2022-03-07 VITALS
HEART RATE: 78 BPM | HEIGHT: 62 IN | WEIGHT: 179.6 LBS | RESPIRATION RATE: 17 BRPM | SYSTOLIC BLOOD PRESSURE: 124 MMHG | BODY MASS INDEX: 33.05 KG/M2 | OXYGEN SATURATION: 96 % | DIASTOLIC BLOOD PRESSURE: 68 MMHG | TEMPERATURE: 97.2 F

## 2022-03-07 DIAGNOSIS — I10 ESSENTIAL HYPERTENSION: ICD-10-CM

## 2022-03-07 DIAGNOSIS — R60.0 BILATERAL LOWER EXTREMITY EDEMA: Primary | ICD-10-CM

## 2022-03-07 DIAGNOSIS — J43.9 PULMONARY EMPHYSEMA, UNSPECIFIED EMPHYSEMA TYPE (HCC): ICD-10-CM

## 2022-03-07 DIAGNOSIS — E11.21 CONTROLLED TYPE 2 DIABETES MELLITUS WITH DIABETIC NEPHROPATHY, WITHOUT LONG-TERM CURRENT USE OF INSULIN (HCC): ICD-10-CM

## 2022-03-07 DIAGNOSIS — M05.79 RHEUMATOID ARTHRITIS INVOLVING MULTIPLE SITES WITH POSITIVE RHEUMATOID FACTOR (HCC): ICD-10-CM

## 2022-03-07 PROCEDURE — G8536 NO DOC ELDER MAL SCRN: HCPCS | Performed by: FAMILY MEDICINE

## 2022-03-07 PROCEDURE — 99214 OFFICE O/P EST MOD 30 MIN: CPT | Performed by: FAMILY MEDICINE

## 2022-03-07 PROCEDURE — G9899 SCRN MAM PERF RSLTS DOC: HCPCS | Performed by: FAMILY MEDICINE

## 2022-03-07 PROCEDURE — G9711 PT HX TOT COL OR COLON CA: HCPCS | Performed by: FAMILY MEDICINE

## 2022-03-07 PROCEDURE — G8752 SYS BP LESS 140: HCPCS | Performed by: FAMILY MEDICINE

## 2022-03-07 PROCEDURE — G8417 CALC BMI ABV UP PARAM F/U: HCPCS | Performed by: FAMILY MEDICINE

## 2022-03-07 PROCEDURE — G8754 DIAS BP LESS 90: HCPCS | Performed by: FAMILY MEDICINE

## 2022-03-07 PROCEDURE — 1101F PT FALLS ASSESS-DOCD LE1/YR: CPT | Performed by: FAMILY MEDICINE

## 2022-03-07 PROCEDURE — G8510 SCR DEP NEG, NO PLAN REQD: HCPCS | Performed by: FAMILY MEDICINE

## 2022-03-07 PROCEDURE — G8399 PT W/DXA RESULTS DOCUMENT: HCPCS | Performed by: FAMILY MEDICINE

## 2022-03-07 PROCEDURE — 3046F HEMOGLOBIN A1C LEVEL >9.0%: CPT | Performed by: FAMILY MEDICINE

## 2022-03-07 PROCEDURE — 2022F DILAT RTA XM EVC RTNOPTHY: CPT | Performed by: FAMILY MEDICINE

## 2022-03-07 PROCEDURE — G8427 DOCREV CUR MEDS BY ELIG CLIN: HCPCS | Performed by: FAMILY MEDICINE

## 2022-03-07 PROCEDURE — 1090F PRES/ABSN URINE INCON ASSESS: CPT | Performed by: FAMILY MEDICINE

## 2022-03-07 RX ORDER — BUDESONIDE AND FORMOTEROL FUMARATE DIHYDRATE 160; 4.5 UG/1; UG/1
AEROSOL RESPIRATORY (INHALATION)
Qty: 30.6 EACH | Refills: 3 | Status: SHIPPED | OUTPATIENT
Start: 2022-03-07 | End: 2022-06-14 | Stop reason: ALTCHOICE

## 2022-03-07 RX ORDER — UMECLIDINIUM 62.5 UG/1
AEROSOL, POWDER ORAL
Qty: 3 EACH | Refills: 3 | Status: SHIPPED | OUTPATIENT
Start: 2022-03-07 | End: 2022-06-14 | Stop reason: SDUPTHER

## 2022-03-07 NOTE — PROGRESS NOTES
Diego Edmond is a 70 y.o.  female and presents with    Chief Complaint   Patient presents with    Foot Swelling           Subjective:  Pt presents to the clinic for f/u of ongoing medical conditions. Pt endorses that she continues to feel SOB with walking and sitting. She notes that she has been wheezing more frequently and coughing more. She is having to use her rescue inhaler everyday with only moderate relief. She would like a referral to pulmonology and is interested in supplemental oxygen. Pt also endorses that her edema is persisting despite the addition of Lasix to her medication regimen. She notes that the swelling has neither improved or worsened, and her L>R. She notes that the sensation of the L leg feels different as well. Pt checks her glucose 2x/day with numbers ranging in the 130-140s. She denies lightheadedness, dizziness, tremors, diaphoresis, and low blood sugars. Pt is checking her BP 3-4x/week. She is unable to recall what numbers she gets at home. She denies HA, chest pain, and palpatitions. Past Medical History:   Diagnosis Date    Advance directive in chart 7/11/2016    Asthma 12/2/2013    GERD (gastroesophageal reflux disease) 4/29/2011    HTN (hypertension) 4/29/2011    Rheumatoid arthritis(714.0) 4/29/2011    Type II or unspecified type diabetes mellitus without mention of complication, not stated as uncontrolled 4/29/2011       ROS   Negative except as listed above. All other systems reviewed and are negative.       Objective:  Vitals:    03/07/22 0958 03/07/22 1000   BP: (!) 148/69 (!) 153/61   Pulse: 78    Resp: 17    Temp: 97.2 °F (36.2 °C)    TempSrc: Temporal    SpO2: 96%    Weight: 179 lb 9.6 oz (81.5 kg)    Height: 5' 2\" (1.575 m)        alert, well appearing, and in no distress  Mental status - alert, oriented to person, place, and time  Neck - supple, no significant adenopathy  Chest - clear to auscultation, no wheezes, rales or rhonchi, symmetric air entry  Heart - normal rate, regular rhythm, normal S1, S2, no murmurs, rubs, clicks or gallops  Abdomen - soft, nontender, nondistended, no masses or organomegaly  Extremities - peripheral pulses normal, no clubbing or cyanosis, pitting pedal edema 2 + bilaterally  Skin - normal coloration and turgor, no rashes, no suspicious skin lesions noted    LABS     TESTS      Assessment/Plan:    1. Pulmonary Emphysema  Referral to pulmonology. Will continue with current medication regimen. 2. Bilateral lower extremity edema  Pt to continue medication regimen, order placed for compression socks. Pt referred for echo to assess for CHF. 3. Essential hypertension  Pt to continue to current medication regimen. 4. Type 2 diabetes mellitus  Hgb A1c ordered w/ goal < 7. Continue w/ current medication regimen. I have discussed the diagnosis with the patient and the intended plan as seen in the above orders. The patient has received an after-visit summary and questions were answered concerning future plans. I have discussed medication side effects and warnings with the patient as well. I have reviewed the plan of care with the patient, accepted their input and they are in agreement with the treatment goals. *ATTENTION:  This note has been created by a medical student for educational purposes only. Please do not refer to the content of this note for clinical decision-making, billing, or other purposes. Please see attending physicians note to obtain clinical information on this patient. *

## 2022-03-07 NOTE — PROGRESS NOTES
Claude Nap is a 70 y.o.  female and presents with    Chief Complaint   Patient presents with    Foot Swelling       Subjective:  Pt presents to the clinic for f/u of ongoing medical conditions. Pt endorses that she continues to feel SOB with walking and sitting. She notes that she has been wheezing more frequently and coughing more. She is having to use her rescue inhaler everyday with only moderate relief. She would like a referral to pulmonology and is interested in supplemental oxygen. Pt also endorses that her edema is persisting despite the addition of Lasix to her medication regimen. She notes that the swelling has neither improved or worsened, and her L>R. She notes that the sensation of the L leg feels different as well. Pt checks her glucose 2x/day with numbers ranging in the 130-140s. She denies lightheadedness, dizziness, tremors, diaphoresis, and low blood sugars. Pt is checking her BP 3-4x/week. She is unable to recall what numbers she gets at home. She denies HA, chest pain, and palpitations. ROS     All other systems reviewed and are negative. Objective:  Vitals:    03/07/22 0958 03/07/22 1000   BP: (!) 148/69 124/68   Pulse: 78    Resp: 17    Temp: 97.2 °F (36.2 °C)    TempSrc: Temporal    SpO2: 96%    Weight: 179 lb 9.6 oz (81.5 kg)    Height: 5' 2\" (1.575 m)      alert, well appearing, and in no distress  Mental status - alert, oriented to person, place, and time  Neck - supple, no significant adenopathy  Chest - clear to auscultation, no wheezes, rales or rhonchi, symmetric air entry  Heart - normal rate, regular rhythm, normal S1, S2, no murmurs, rubs, clicks or gallops  Abdomen - soft, nontender, nondistended, no masses or organomegaly  Extremities - peripheral pulses normal, no clubbing or cyanosis, pitting pedal edema 2 + bilaterally  Skin - normal coloration and turgor, no rashes, no suspicious skin lesions noted    LABS     TESTS      Assessment/Plan:    1. Rheumatoid arthritis involving multiple sites with positive rheumatoid factor ([facility]  F/u with rheumatology; continue current management    2. Pulmonary emphysema, unspecified emphysema type ([facility]  use inhaled therapy as prescribed for better control of symptoms; refer for further evaluation and treatment  - budesonide-formoteroL (Symbicort) 160-4.5 mcg/actuation HFAA; TAKE 2 PUFFS BY MOUTH TWO TIMES A DAY. Dispense: 30.6 Each; Refill: 3  - umeclidinium (Incruse Ellipta) 62.5 mcg/actuation inhaler; TAKE 1 PUFF BY MOUTH EVERY DAY  Dispense: 3 Each; Refill: 3  - REFERRAL TO PULMONARY DISEASE    3. Controlled type 2 diabetes mellitus with diabetic nephropathy, without long-term current use of insulin (HCC)  Goal hgb a1c <7; assess control of disease   - HEMOGLOBIN A1C WITH EAG; Future    4. Bilateral lower extremity edema  Assess cardiac function; compression stockings ordered  - ECHO ADULT COMPLETE; Future  - AMB SUPPLY ORDER    5. Essential hypertension  Goal <130/80; at goal with current medications      Lab review: orders written for new lab studies as appropriate; see orders      I have discussed the diagnosis with the patient and the intended plan as seen in the above orders. The patient has received an after-visit summary and questions were answered concerning future plans. I have discussed medication side effects and warnings with the patient as well. I have reviewed the plan of care with the patient, accepted their input and they are in agreement with the treatment goals.

## 2022-03-07 NOTE — PROGRESS NOTES
Hallie Curiel is a 70 y.o. female (: 1950) presenting to address:    Chief Complaint   Patient presents with    Foot Swelling       There were no vitals filed for this visit. Hearing/Vision:   No exam data present    Learning Assessment:     Learning Assessment 2013   PRIMARY LEARNER Patient   PRIMARY LANGUAGE ENGLISH   LEARNER PREFERENCE PRIMARY LISTENING   ANSWERED BY patient   RELATIONSHIP SELF     Depression Screening:     3 most recent PHQ Screens 3/7/2022   Little interest or pleasure in doing things Not at all   Feeling down, depressed, irritable, or hopeless Not at all   Total Score PHQ 2 0     Fall Risk Assessment:     Fall Risk Assessment, last 12 mths 3/7/2022   Able to walk? Yes   Fall in past 12 months? 0   Do you feel unsteady? 0   Are you worried about falling 0     Abuse Screening:     Abuse Screening Questionnaire 2020   Do you ever feel afraid of your partner? N   Are you in a relationship with someone who physically or mentally threatens you? N   Is it safe for you to go home? Y     ADL Assessment:     ADL Assessment 2018   Feeding yourself No Help Needed   Getting from bed to chair No Help Needed   Getting dressed No Help Needed   Bathing or showering No Help Needed   Walk across the room (includes cane/walker) No Help Needed   Using the telphone No Help Needed   Taking your medications No Help Needed   Preparing meals No Help Needed   Managing money (expenses/bills) No Help Needed   Moderately strenuous housework (laundry) No Help Needed   Shopping for personal items (toiletries/medicines) No Help Needed   Shopping for groceries No Help Needed   Driving No Help Needed   Climbing a flight of stairs No Help Needed   Getting to places beyond walking distances No Help Needed        Coordination of Care Questionaire:   1. Have you been to the ER, urgent care clinic since your last visit? Hospitalized since your last visit? NO    2.  Have you seen or consulted any other health care providers outside of the [address] [place] [address] since your last visit? Include any pap smears or colon screening.  NO

## 2022-03-18 PROBLEM — J43.9 PULMONARY EMPHYSEMA (HCC): Status: ACTIVE | Noted: 2018-03-08

## 2022-03-18 PROBLEM — E11.21 TYPE 2 DIABETES MELLITUS WITH NEPHROPATHY (HCC): Status: ACTIVE | Noted: 2018-01-23

## 2022-04-06 ENCOUNTER — HOSPITAL ENCOUNTER (OUTPATIENT)
Dept: NON INVASIVE DIAGNOSTICS | Age: 72
Discharge: HOME | End: 2022-04-06
Attending: FAMILY MEDICINE
Payer: MEDICARE

## 2022-04-06 VITALS
WEIGHT: 179 LBS | DIASTOLIC BLOOD PRESSURE: 68 MMHG | SYSTOLIC BLOOD PRESSURE: 124 MMHG | HEIGHT: 62 IN | BODY MASS INDEX: 32.94 KG/M2

## 2022-04-06 DIAGNOSIS — R60.0 BILATERAL LOWER EXTREMITY EDEMA: ICD-10-CM

## 2022-04-06 LAB
ECHO AO ASC DIAM: 3 CM
ECHO AO ASCENDING AORTA INDEX: 1.65 CM/M2
ECHO AO ROOT DIAM: 3 CM
ECHO AO ROOT INDEX: 1.65 CM/M2
ECHO EST RA PRESSURE: 3 MMHG
ECHO LA VOL 2C: 33 ML (ref 22–52)
ECHO LA VOL 4C: 31 ML (ref 22–52)
ECHO LA VOLUME AREA LENGTH: 36 ML
ECHO LA VOLUME INDEX A2C: 18 ML/M2 (ref 16–34)
ECHO LA VOLUME INDEX A4C: 17 ML/M2 (ref 16–34)
ECHO LA VOLUME INDEX AREA LENGTH: 20 ML/M2 (ref 16–34)
ECHO LV E' LATERAL VELOCITY: 8 CM/S
ECHO LV FRACTIONAL SHORTENING: 43 % (ref 28–44)
ECHO LV INTERNAL DIMENSION DIASTOLE INDEX: 2.31 CM/M2
ECHO LV INTERNAL DIMENSION DIASTOLIC: 4.2 CM (ref 3.9–5.3)
ECHO LV INTERNAL DIMENSION SYSTOLIC INDEX: 1.32 CM/M2
ECHO LV INTERNAL DIMENSION SYSTOLIC: 2.4 CM
ECHO LV IVSD: 0.8 CM (ref 0.6–0.9)
ECHO LV MASS 2D: 118.7 G (ref 67–162)
ECHO LV MASS INDEX 2D: 65.2 G/M2 (ref 43–95)
ECHO LV POSTERIOR WALL DIASTOLIC: 1 CM (ref 0.6–0.9)
ECHO LV RELATIVE WALL THICKNESS RATIO: 0.48
ECHO LVOT AREA: 2.8 CM2
ECHO LVOT DIAM: 1.9 CM
ECHO LVOT MEAN GRADIENT: 3 MMHG
ECHO LVOT PEAK GRADIENT: 4 MMHG
ECHO LVOT PEAK VELOCITY: 1.1 M/S
ECHO LVOT STROKE VOLUME INDEX: 39.5 ML/M2
ECHO LVOT SV: 72 ML
ECHO LVOT VTI: 25.4 CM
ECHO MV A VELOCITY: 0.91 M/S
ECHO MV E DECELERATION TIME (DT): 214.7 MS
ECHO MV E VELOCITY: 0.85 M/S
ECHO MV E/A RATIO: 0.93
ECHO MV E/E' LATERAL: 10.63
ECHO RIGHT VENTRICULAR SYSTOLIC PRESSURE (RVSP): 28 MMHG
ECHO RV TAPSE: 2 CM (ref 1.5–2)
ECHO TV REGURGITANT MAX VELOCITY: 2.49 M/S
ECHO TV REGURGITANT PEAK GRADIENT: 25 MMHG

## 2022-04-06 PROCEDURE — 93306 TTE W/DOPPLER COMPLETE: CPT

## 2022-04-25 ENCOUNTER — DOCUMENTATION ONLY (OUTPATIENT)
Dept: PULMONOLOGY | Age: 72
End: 2022-04-25

## 2022-04-25 NOTE — PROGRESS NOTES
Spoke with Pt re np ref. She has been following w/Dr. Fabiola Smith. None of those records were sent w/ref. Informed pt that we will need those records BEFORE we can set up her new patient appt. Pt wants to be seen by [facility] at the [facility].

## 2022-06-14 ENCOUNTER — OFFICE VISIT (OUTPATIENT)
Dept: FAMILY MEDICINE CLINIC | Age: 72
End: 2022-06-14
Payer: MEDICARE

## 2022-06-14 VITALS
SYSTOLIC BLOOD PRESSURE: 130 MMHG | HEART RATE: 69 BPM | BODY MASS INDEX: 33.16 KG/M2 | TEMPERATURE: 97.3 F | DIASTOLIC BLOOD PRESSURE: 76 MMHG | WEIGHT: 180.2 LBS | HEIGHT: 62 IN | RESPIRATION RATE: 16 BRPM | OXYGEN SATURATION: 97 %

## 2022-06-14 DIAGNOSIS — E11.21 TYPE 2 DIABETES MELLITUS WITH NEPHROPATHY (HCC): ICD-10-CM

## 2022-06-14 DIAGNOSIS — J43.9 PULMONARY EMPHYSEMA, UNSPECIFIED EMPHYSEMA TYPE (HCC): ICD-10-CM

## 2022-06-14 DIAGNOSIS — J30.1 NON-SEASONAL ALLERGIC RHINITIS DUE TO POLLEN: ICD-10-CM

## 2022-06-14 DIAGNOSIS — I10 ESSENTIAL HYPERTENSION: ICD-10-CM

## 2022-06-14 DIAGNOSIS — R60.0 BILATERAL LOWER EXTREMITY EDEMA: Primary | ICD-10-CM

## 2022-06-14 DIAGNOSIS — E78.00 PURE HYPERCHOLESTEROLEMIA: ICD-10-CM

## 2022-06-14 DIAGNOSIS — E11.21 CONTROLLED TYPE 2 DIABETES MELLITUS WITH DIABETIC NEPHROPATHY, WITHOUT LONG-TERM CURRENT USE OF INSULIN (HCC): ICD-10-CM

## 2022-06-14 DIAGNOSIS — E11.42 DIABETIC POLYNEUROPATHY ASSOCIATED WITH TYPE 2 DIABETES MELLITUS (HCC): ICD-10-CM

## 2022-06-14 LAB — HBA1C MFR BLD HPLC: 6.2 %

## 2022-06-14 PROCEDURE — G9899 SCRN MAM PERF RSLTS DOC: HCPCS | Performed by: FAMILY MEDICINE

## 2022-06-14 PROCEDURE — 1090F PRES/ABSN URINE INCON ASSESS: CPT | Performed by: FAMILY MEDICINE

## 2022-06-14 PROCEDURE — G8536 NO DOC ELDER MAL SCRN: HCPCS | Performed by: FAMILY MEDICINE

## 2022-06-14 PROCEDURE — 3044F HG A1C LEVEL LT 7.0%: CPT | Performed by: FAMILY MEDICINE

## 2022-06-14 PROCEDURE — G9711 PT HX TOT COL OR COLON CA: HCPCS | Performed by: FAMILY MEDICINE

## 2022-06-14 PROCEDURE — G8754 DIAS BP LESS 90: HCPCS | Performed by: FAMILY MEDICINE

## 2022-06-14 PROCEDURE — 1123F ACP DISCUSS/DSCN MKR DOCD: CPT | Performed by: FAMILY MEDICINE

## 2022-06-14 PROCEDURE — G8399 PT W/DXA RESULTS DOCUMENT: HCPCS | Performed by: FAMILY MEDICINE

## 2022-06-14 PROCEDURE — 99214 OFFICE O/P EST MOD 30 MIN: CPT | Performed by: FAMILY MEDICINE

## 2022-06-14 PROCEDURE — 2022F DILAT RTA XM EVC RTNOPTHY: CPT | Performed by: FAMILY MEDICINE

## 2022-06-14 PROCEDURE — G8752 SYS BP LESS 140: HCPCS | Performed by: FAMILY MEDICINE

## 2022-06-14 PROCEDURE — G8427 DOCREV CUR MEDS BY ELIG CLIN: HCPCS | Performed by: FAMILY MEDICINE

## 2022-06-14 PROCEDURE — 83036 HEMOGLOBIN GLYCOSYLATED A1C: CPT | Performed by: FAMILY MEDICINE

## 2022-06-14 PROCEDURE — G8510 SCR DEP NEG, NO PLAN REQD: HCPCS | Performed by: FAMILY MEDICINE

## 2022-06-14 PROCEDURE — 1101F PT FALLS ASSESS-DOCD LE1/YR: CPT | Performed by: FAMILY MEDICINE

## 2022-06-14 PROCEDURE — G8417 CALC BMI ABV UP PARAM F/U: HCPCS | Performed by: FAMILY MEDICINE

## 2022-06-14 RX ORDER — BUDESONIDE AND FORMOTEROL FUMARATE DIHYDRATE 160; 4.5 UG/1; UG/1
AEROSOL RESPIRATORY (INHALATION)
Qty: 3 EACH | Refills: 3 | Status: SHIPPED | OUTPATIENT
Start: 2022-06-14

## 2022-06-14 RX ORDER — CETIRIZINE HCL 10 MG
TABLET ORAL
Qty: 90 TABLET | Refills: 3 | Status: SHIPPED | OUTPATIENT
Start: 2022-06-14

## 2022-06-14 RX ORDER — MONTELUKAST SODIUM 10 MG/1
10 TABLET ORAL DAILY
Qty: 90 TABLET | Refills: 3 | Status: SHIPPED | OUTPATIENT
Start: 2022-06-14

## 2022-06-14 RX ORDER — VALSARTAN 80 MG/1
80 TABLET ORAL DAILY
Qty: 90 TABLET | Refills: 3 | Status: SHIPPED | OUTPATIENT
Start: 2022-06-14

## 2022-06-14 RX ORDER — UMECLIDINIUM 62.5 UG/1
AEROSOL, POWDER ORAL
Qty: 3 EACH | Refills: 3 | Status: SHIPPED | OUTPATIENT
Start: 2022-06-14

## 2022-06-14 RX ORDER — BLOOD-GLUCOSE CONTROL, NORMAL
EACH MISCELLANEOUS
Qty: 100 EACH | Refills: 3 | Status: SHIPPED | OUTPATIENT
Start: 2022-06-14

## 2022-06-14 RX ORDER — FUROSEMIDE 40 MG/1
40 TABLET ORAL 2 TIMES DAILY
Qty: 180 TABLET | Refills: 3 | Status: SHIPPED | OUTPATIENT
Start: 2022-06-14

## 2022-06-14 RX ORDER — CALCIUM CITRATE/VITAMIN D3 200MG-6.25
TABLET ORAL
Qty: 100 STRIP | Refills: 3 | Status: SHIPPED | OUTPATIENT
Start: 2022-06-14

## 2022-06-14 RX ORDER — SIMVASTATIN 20 MG/1
20 TABLET, FILM COATED ORAL
Qty: 90 TABLET | Refills: 3 | Status: SHIPPED | OUTPATIENT
Start: 2022-06-14

## 2022-06-14 RX ORDER — GABAPENTIN 100 MG/1
100 CAPSULE ORAL
Qty: 90 CAPSULE | Refills: 1 | Status: SHIPPED | OUTPATIENT
Start: 2022-06-14

## 2022-06-14 RX ORDER — LINAGLIPTIN 5 MG/1
5 TABLET, FILM COATED ORAL DAILY
Qty: 90 TABLET | Refills: 3 | Status: SHIPPED | OUTPATIENT
Start: 2022-06-14

## 2022-06-14 RX ORDER — POTASSIUM CHLORIDE 750 MG/1
10 TABLET, EXTENDED RELEASE ORAL 2 TIMES DAILY
Qty: 180 TABLET | Refills: 3 | Status: SHIPPED | OUTPATIENT
Start: 2022-06-14

## 2022-06-14 RX ORDER — ALBUTEROL SULFATE 90 UG/1
2 AEROSOL, METERED RESPIRATORY (INHALATION)
Qty: 2 EACH | Refills: 2 | Status: SHIPPED | OUTPATIENT
Start: 2022-06-14

## 2022-06-14 NOTE — PROGRESS NOTES
Severiano Flavin is a 70 y.o.  female and presents with    Chief Complaint   Patient presents with    Leg Swelling    Medication Refill       Subjective:  Ms Effie Joseph c/o lower leg edema which continues after using Lasix. She had some improvement initially but has edema L>R. She notes that the sensation of the L leg feels different as well. Pt checks her glucose 2x/day with numbers ranging in the 130-140s. She denies lightheadedness, dizziness, tremors, diaphoresis, and low blood sugars. Pt is checking her BP 3-4x/week. She is unable to recall what numbers she gets at home. She denies HA, chest pain, and palpitations.      Diabetes Mellitus:  She has diabetes mellitus, and  diabetes, hypertension, hyperlipidemia and obesity. Diabetic ROS - medication compliance: compliant all of the time, diabetic diet compliance: compliant most of the time.    Lab review: orders written for new lab studies as appropriate; see orders.       ROS   General ROS: negative for - chills, fatigue or fever  Psychological ROS: negative for - anxiety or depression; insomnia with hypersomnolence during the day  Ophthalmic ROS: positive for - uses glasses  ENT ROS: positive for - headaches, no nasal congestion or sore throat; she continues to clear her throat  Endocrine ROS: negative for - polydipsia/polyuria or temperature intolerance  Respiratory ROS: shortness of breath when she walks from her bed to the end of the bed; she uses albuterol with relief; she reports that wearing a mask is more irritating and interferes with her breathing  Cardiovascular ROS: positive for - improved dyspnea on exertion  negative for - chest pain  Gastrointestinal ROS: no abdominal pain, change in bowel habits, or black or bloody stools  Genito-Urinary ROS: no dysuria, trouble voiding, or hematuria  Musculoskeletal ROS: positive for - pain in toe - left  Neurological ROS: negative for - numbness/tingling  Dermatological ROS: positive for - skin lesion changes       All other systems reviewed and are negative. Objective:  Vitals:    06/14/22 0950 06/14/22 0953   BP: (!) 148/61 (!) 142/60   Pulse: 69    Resp: 16    Temp: 97.3 °F (36.3 °C)    TempSrc: Temporal    SpO2: 97%    Weight: 180 lb 3.2 oz (81.7 kg)    Height: 5' 2\" (1.575 m)        alert, well appearing, and in no distress and oriented to person, place, and time  Mental status - normal mood, behavior, speech, dress, motor activity, and thought processes  Chest - clear to auscultation, no wheezes, rales or rhonchi, symmetric air entry  Heart - normal rate, regular rhythm, normal S1, S2, no murmurs, rubs, clicks or gallops  Extremities - pedal edema 2 + left > right; mild ttp    LABS   hgb a1c 6.2  TESTS      Assessment/Plan:    1. Bilateral lower extremity edema  reviewed echocardiogram; evaluate for dvt; increase dose of furosemide  - DUPLEX LOWER EXT VENOUS BILAT; Future  - furosemide (LASIX) 40 mg tablet; Take 1 Tablet by mouth two (2) times a day. Dispense: 180 Tablet; Refill: 3  - METABOLIC PANEL, COMPREHENSIVE; Future    2. Type 2 diabetes mellitus with nephropathy (HCC)  Goal hgb a1c <7; at goal; continue current treatment  - AMB POC HEMOGLOBIN A1C    3. Pulmonary emphysema, unspecified emphysema type ([facility]  Continue inhaled therapy  - albuterol (PROVENTIL HFA, VENTOLIN HFA, PROAIR HFA) 90 mcg/actuation inhaler; Take 2 Puffs by inhalation every six (6) hours as needed for Wheezing or Shortness of Breath. Dispense: 2 Each; Refill: 2  - budesonide-formoteroL (Symbicort) 160-4.5 mcg/actuation HFAA; TAKE 2 PUFFS BY MOUTH TWO TIMES A DAY. Dispense: 3 Each; Refill: 3    4. Diabetic polyneuropathy associated with type 2 diabetes mellitus ([facility]  Foot care reviewed  - gabapentin (NEURONTIN) 100 mg capsule; Take 1 Capsule by mouth nightly. Max Daily Amount: 100 mg. Dispense: 90 Capsule;  Refill: 1    Lab review: labs reviewed, I note that glycosylated hemoglobin mildly abnormal but acceptable, orders written for new lab studies as appropriate; see orders      I have discussed the diagnosis with the patient and the intended plan as seen in the above orders. The patient has received an after-visit summary and questions were answered concerning future plans. I have discussed medication side effects and warnings with the patient as well. I have reviewed the plan of care with the patient, accepted their input and they are in agreement with the treatment goals.

## 2022-06-14 NOTE — PROGRESS NOTES
Alo Bishop is a 70 y.o. female (: 1950) presenting to address:    Chief Complaint   Patient presents with    Leg Swelling    Medication Refill       There were no vitals filed for this visit. Hearing/Vision:   No exam data present    Learning Assessment:     Learning Assessment 2013   PRIMARY LEARNER Patient   PRIMARY LANGUAGE ENGLISH   LEARNER PREFERENCE PRIMARY LISTENING   ANSWERED BY patient   RELATIONSHIP SELF     Depression Screening:     3 most recent PHQ Screens 2022   Little interest or pleasure in doing things Not at all   Feeling down, depressed, irritable, or hopeless Not at all   Total Score PHQ 2 0     Fall Risk Assessment:     Fall Risk Assessment, last 12 mths 2022   Able to walk? Yes   Fall in past 12 months? 0   Do you feel unsteady? 0   Are you worried about falling 0     Abuse Screening:     Abuse Screening Questionnaire 2020   Do you ever feel afraid of your partner? N   Are you in a relationship with someone who physically or mentally threatens you? N   Is it safe for you to go home? Y     ADL Assessment:     ADL Assessment 2018   Feeding yourself No Help Needed   Getting from bed to chair No Help Needed   Getting dressed No Help Needed   Bathing or showering No Help Needed   Walk across the room (includes cane/walker) No Help Needed   Using the telphone No Help Needed   Taking your medications No Help Needed   Preparing meals No Help Needed   Managing money (expenses/bills) No Help Needed   Moderately strenuous housework (laundry) No Help Needed   Shopping for personal items (toiletries/medicines) No Help Needed   Shopping for groceries No Help Needed   Driving No Help Needed   Climbing a flight of stairs No Help Needed   Getting to places beyond walking distances No Help Needed        Coordination of Care Questionaire:     1. \"Have you been to the ER, urgent care clinic since your last visit? Hospitalized since your last visit? \" No    2.  \"Have you seen or consulted any other health care providers outside of the [address] [place] [address] since your last visit? \" No     3. For patients aged 39-70: Has the patient had a colonoscopy / FIT/ Cologuard? Yes - no Care Gap present      If the patient is female:    4. For patients aged 41-77: Has the patient had a mammogram within the past 2 years? Yes - no Care Gap present      5. For patients aged 21-65: Has the patient had a pap smear?  NA - based on age or sex

## 2022-06-16 ENCOUNTER — HOSPITAL ENCOUNTER (OUTPATIENT)
Dept: WOMENS IMAGING | Age: 72
Discharge: HOME | End: 2022-06-16
Attending: FAMILY MEDICINE
Payer: MEDICARE

## 2022-06-16 DIAGNOSIS — Z12.31 ENCOUNTER FOR SCREENING MAMMOGRAM FOR BREAST CANCER: ICD-10-CM

## 2022-06-16 PROCEDURE — 77063 BREAST TOMOSYNTHESIS BI: CPT

## 2022-06-17 DIAGNOSIS — R92.8 ABNORMAL MAMMOGRAM OF RIGHT BREAST: ICD-10-CM

## 2022-06-17 DIAGNOSIS — R92.8 ABNORMAL MAMMOGRAM OF RIGHT BREAST: Primary | ICD-10-CM

## 2022-06-23 ENCOUNTER — HOSPITAL ENCOUNTER (OUTPATIENT)
Dept: VASCULAR SURGERY | Age: 72
Discharge: HOME | End: 2022-06-23
Attending: FAMILY MEDICINE
Payer: MEDICARE

## 2022-06-23 DIAGNOSIS — R60.0 BILATERAL LOWER EXTREMITY EDEMA: ICD-10-CM

## 2022-06-23 PROCEDURE — 93970 EXTREMITY STUDY: CPT

## 2022-07-05 ENCOUNTER — HOSPITAL ENCOUNTER (OUTPATIENT)
Dept: WOMENS IMAGING | Age: 72
Discharge: HOME | End: 2022-07-05
Attending: FAMILY MEDICINE
Payer: MEDICARE

## 2022-07-05 ENCOUNTER — HOSPITAL ENCOUNTER (OUTPATIENT)
Dept: ULTRASOUND IMAGING | Age: 72
Discharge: HOME | End: 2022-07-05
Attending: FAMILY MEDICINE
Payer: MEDICARE

## 2022-07-05 PROCEDURE — 76642 ULTRASOUND BREAST LIMITED: CPT

## 2022-07-05 PROCEDURE — 77061 BREAST TOMOSYNTHESIS UNI: CPT

## 2022-07-19 ENCOUNTER — DOCUMENTATION ONLY (OUTPATIENT)
Dept: PULMONOLOGY | Age: 72
End: 2022-07-19

## 2022-07-19 NOTE — PROGRESS NOTES
Lm am for pt to schd np appt & sp-asthma, copd-manuel ref-records from Dr Jesi Kaur in pending file cabinet

## 2022-08-25 ENCOUNTER — OFFICE VISIT (OUTPATIENT)
Dept: PULMONOLOGY | Age: 72
End: 2022-08-25
Payer: MEDICARE

## 2022-08-25 VITALS — WEIGHT: 180 LBS | BODY MASS INDEX: 33.13 KG/M2 | HEIGHT: 62 IN

## 2022-08-25 DIAGNOSIS — J43.9 PULMONARY EMPHYSEMA, UNSPECIFIED EMPHYSEMA TYPE (HCC): Primary | ICD-10-CM

## 2022-08-25 PROCEDURE — 94060 EVALUATION OF WHEEZING: CPT | Performed by: INTERNAL MEDICINE

## 2022-09-14 ENCOUNTER — OFFICE VISIT (OUTPATIENT)
Dept: FAMILY MEDICINE CLINIC | Age: 72
End: 2022-09-14
Payer: MEDICARE

## 2022-09-14 VITALS
WEIGHT: 180 LBS | DIASTOLIC BLOOD PRESSURE: 64 MMHG | OXYGEN SATURATION: 99 % | RESPIRATION RATE: 17 BRPM | HEART RATE: 78 BPM | BODY MASS INDEX: 33.13 KG/M2 | HEIGHT: 62 IN | TEMPERATURE: 97.2 F | SYSTOLIC BLOOD PRESSURE: 136 MMHG

## 2022-09-14 DIAGNOSIS — Z28.21 REFUSED INFLUENZA VACCINE: ICD-10-CM

## 2022-09-14 DIAGNOSIS — E11.21 TYPE 2 DIABETES MELLITUS WITH NEPHROPATHY (HCC): ICD-10-CM

## 2022-09-14 DIAGNOSIS — Z71.89 ADVANCE CARE PLANNING: ICD-10-CM

## 2022-09-14 DIAGNOSIS — I87.2 SAPHENOFEMORAL VENOUS REFLUX: ICD-10-CM

## 2022-09-14 DIAGNOSIS — Z00.00 MEDICARE ANNUAL WELLNESS VISIT, SUBSEQUENT: Primary | ICD-10-CM

## 2022-09-14 DIAGNOSIS — J43.9 PULMONARY EMPHYSEMA, UNSPECIFIED EMPHYSEMA TYPE (HCC): ICD-10-CM

## 2022-09-14 PROCEDURE — G8399 PT W/DXA RESULTS DOCUMENT: HCPCS | Performed by: FAMILY MEDICINE

## 2022-09-14 PROCEDURE — G8536 NO DOC ELDER MAL SCRN: HCPCS | Performed by: FAMILY MEDICINE

## 2022-09-14 PROCEDURE — G8432 DEP SCR NOT DOC, RNG: HCPCS | Performed by: FAMILY MEDICINE

## 2022-09-14 PROCEDURE — 1101F PT FALLS ASSESS-DOCD LE1/YR: CPT | Performed by: FAMILY MEDICINE

## 2022-09-14 PROCEDURE — G8417 CALC BMI ABV UP PARAM F/U: HCPCS | Performed by: FAMILY MEDICINE

## 2022-09-14 PROCEDURE — G0439 PPPS, SUBSEQ VISIT: HCPCS | Performed by: FAMILY MEDICINE

## 2022-09-14 PROCEDURE — G9899 SCRN MAM PERF RSLTS DOC: HCPCS | Performed by: FAMILY MEDICINE

## 2022-09-14 PROCEDURE — 2022F DILAT RTA XM EVC RTNOPTHY: CPT | Performed by: FAMILY MEDICINE

## 2022-09-14 PROCEDURE — 99497 ADVNCD CARE PLAN 30 MIN: CPT | Performed by: FAMILY MEDICINE

## 2022-09-14 PROCEDURE — 3044F HG A1C LEVEL LT 7.0%: CPT | Performed by: FAMILY MEDICINE

## 2022-09-14 PROCEDURE — G8754 DIAS BP LESS 90: HCPCS | Performed by: FAMILY MEDICINE

## 2022-09-14 PROCEDURE — G8752 SYS BP LESS 140: HCPCS | Performed by: FAMILY MEDICINE

## 2022-09-14 PROCEDURE — 1090F PRES/ABSN URINE INCON ASSESS: CPT | Performed by: FAMILY MEDICINE

## 2022-09-14 PROCEDURE — G8427 DOCREV CUR MEDS BY ELIG CLIN: HCPCS | Performed by: FAMILY MEDICINE

## 2022-09-14 PROCEDURE — G9711 PT HX TOT COL OR COLON CA: HCPCS | Performed by: FAMILY MEDICINE

## 2022-09-14 PROCEDURE — 99214 OFFICE O/P EST MOD 30 MIN: CPT | Performed by: FAMILY MEDICINE

## 2022-09-14 NOTE — PROGRESS NOTES
Hallie Curiel is a 67 y.o. female (: 1950) presenting to address:    Chief Complaint   Patient presents with    Annual Wellness Visit    Leg Swelling    Medication Refill       There were no vitals filed for this visit. Hearing/Vision:   No results found. Learning Assessment:     Learning Assessment 2013   PRIMARY LEARNER Patient   PRIMARY LANGUAGE ENGLISH   LEARNER PREFERENCE PRIMARY LISTENING   ANSWERED BY patient   RELATIONSHIP SELF     Depression Screening:     3 most recent PHQ Screens 2022   Little interest or pleasure in doing things Not at all   Feeling down, depressed, irritable, or hopeless Not at all   Total Score PHQ 2 0     Fall Risk Assessment:     Fall Risk Assessment, last 12 mths 2022   Able to walk? Yes   Fall in past 12 months? 0   Do you feel unsteady? 0   Are you worried about falling 0     Abuse Screening:     Abuse Screening Questionnaire 2020   Do you ever feel afraid of your partner? N   Are you in a relationship with someone who physically or mentally threatens you? N   Is it safe for you to go home? Y     ADL Assessment:     ADL Assessment 2018   Feeding yourself No Help Needed   Getting from bed to chair No Help Needed   Getting dressed No Help Needed   Bathing or showering No Help Needed   Walk across the room (includes cane/walker) No Help Needed   Using the telphone No Help Needed   Taking your medications No Help Needed   Preparing meals No Help Needed   Managing money (expenses/bills) No Help Needed   Moderately strenuous housework (laundry) No Help Needed   Shopping for personal items (toiletries/medicines) No Help Needed   Shopping for groceries No Help Needed   Driving No Help Needed   Climbing a flight of stairs No Help Needed   Getting to places beyond walking distances No Help Needed        Coordination of Care Questionaire:     1. \"Have you been to the ER, urgent care clinic since your last visit? Hospitalized since your last visit? \" No    2. \"Have you seen or consulted any other health care providers outside of the [address] [place] [address] since your last visit? \" No     3. For patients aged 39-70: Has the patient had a colonoscopy / FIT/ Cologuard? Yes - no Care Gap present      If the patient is female:    4. For patients aged 41-77: Has the patient had a mammogram within the past 2 years? Yes - no Care Gap present      5. For patients aged 21-65: Has the patient had a pap smear?  NA - based on age or sex

## 2022-09-14 NOTE — PROGRESS NOTES
(AWV) The Initial Medicare Annual Wellness Exam PROGRESS NOTE    This is a Medicare Annual Wellness Exam (AWV)     I have reviewed the patient's medical history in detail and updated the computerized patient record. Juan Jose Herring is a 67 y.o.  female and presents for an annual wellness exam     ROS   General ROS: negative for - chills, fatigue or fever  Psychological ROS: negative for - anxiety or depression; insomnia with hypersomnolence during the day  Ophthalmic ROS: positive for - uses glasses  ENT ROS: positive for - headaches, no nasal congestion or sore throat; she continues to clear her throat  Endocrine ROS: negative for - polydipsia/polyuria or temperature intolerance  Respiratory ROS: shortness of breath when she walks from her bed to the end of the bed; she uses albuterol with relief; she reports that wearing a mask is more irritating and interferes with her breathing  Cardiovascular ROS: positive for - improved dyspnea on exertion  negative for - chest pain  Gastrointestinal ROS: no abdominal pain, change in bowel habits, or black or bloody stools  Genito-Urinary ROS: no dysuria, trouble voiding, or hematuria  Musculoskeletal ROS: positive for - pain in toe - left  Neurological ROS: negative for - numbness/tingling  Dermatological ROS: positive for - skin lesion changes    All other systems reviewed and are negative.     History     Past Medical History:   Diagnosis Date    Advance directive in chart 7/11/2016    Asthma 12/2/2013    GERD (gastroesophageal reflux disease) 4/29/2011    HTN (hypertension) 4/29/2011    Rheumatoid arthritis(714.0) 4/29/2011    Type II or unspecified type diabetes mellitus without mention of complication, not stated as uncontrolled 4/29/2011      Past Surgical History:   Procedure Laterality Date    HX BREAST BIOPSY Right 1970's    rt benign    HX HYSTERECTOMY      HX NARCISO AND BSO  2004     Current Outpatient Medications   Medication Sig Dispense Refill albuterol (PROVENTIL HFA, VENTOLIN HFA, PROAIR HFA) 90 mcg/actuation inhaler Take 2 Puffs by inhalation every six (6) hours as needed for Wheezing or Shortness of Breath. 2 Each 2    furosemide (LASIX) 40 mg tablet Take 1 Tablet by mouth two (2) times a day. 180 Tablet 3    budesonide-formoteroL (Symbicort) 160-4.5 mcg/actuation HFAA TAKE 2 PUFFS BY MOUTH TWO TIMES A DAY. 3 Each 3    gabapentin (NEURONTIN) 100 mg capsule Take 1 Capsule by mouth nightly. Max Daily Amount: 100 mg. 90 Capsule 1    umeclidinium (Incruse Ellipta) 62.5 mcg/actuation inhaler TAKE 1 PUFF BY MOUTH EVERY DAY 3 Each 3    glucose blood VI test strips (True Metrix Glucose Test Strip) strip TEST BLOOD GLUCOSE ONCE A  Strip 3    lancets 30 gauge misc Test blood glucose twice a day 100 Each 3    potassium chloride (KLOR-CON M10) 10 mEq tablet Take 1 Tablet by mouth two (2) times a day. 180 Tablet 3    cetirizine (ZYRTEC) 10 mg tablet TAKE 1 TABLET BY MOUTH EVERY DAY 90 Tablet 3    montelukast (SINGULAIR) 10 mg tablet Take 1 Tablet by mouth daily. 90 Tablet 3    simvastatin (Zocor) 20 mg tablet Take 1 Tablet by mouth nightly. 90 Tablet 3    valsartan (DIOVAN) 80 mg tablet Take 1 Tablet by mouth daily. 90 Tablet 3    linaGLIPtin (Tradjenta) 5 mg tablet Take 1 Tablet by mouth daily. 90 Tablet 3    Blood-Glucose Meter (True Metrix Glucose Meter) misc Test blood glucose once a day 1 Each 0    lidocaine HCl (XYLOCAINE) 3 % topical cream Apply  to affected area two (2) times a day.  453.6 g 1     Allergies   Allergen Reactions    Percocet [Oxycodone-Acetaminophen] Nausea and Vomiting    Ultram [Tramadol] Swelling     Family History   Problem Relation Age of Onset    Hypertension Mother     Arthritis-rheumatoid Mother     Stroke Father     OSTEOARTHRITIS Sister     Stroke Brother 77     Social History     Tobacco Use    Smoking status: Never    Smokeless tobacco: Never   Substance Use Topics    Alcohol use: No     Patient Active Problem List Diagnosis Code    GERD (gastroesophageal reflux disease) K21.9    HTN (hypertension) I10    Rheumatoid arthritis involving multiple sites with positive rheumatoid factor (HCC) M05.79    Pure hypercholesterolemia E78.00    Advance directive in chart Z78.9    Type 2 diabetes mellitus with nephropathy ([facility] [address] E11.21    Pulmonary emphysema ([facility] J43.9       Health Maintenance History  Immunizations reviewed, dtap up to date, pneumovax up to date, flu refused, zosterdue  colonoscopy:up to date,   Eye exam: up to date  Mammoup to date  Dexascan up to date        Depression Risk Factor Screening:      Patient Health Questionnaire (PHQ-2)   Over the last 2 weeks, how often have you been bothered by any of the following problems? Little interest or pleasure in doing things? Not at all. [0]  Feeling down, depressed, or hopeless? Not at all. [0]    Total Score: 0/6  PHQ-2 Assessment Scoring:   A score of 2 or more requires further screening with the PHQ-9    Alcohol Risk Factor Screening:     Women: On any occasion during the past 3 months, have you had more than 3 drinks containing alcohol? no   Do you average more than 7 drinks per week? no    Functional Ability and Level of Safety:     Hearing Loss    Hearing is good. Activities of Daily Living   Self-care. Requires assistance with: no ADLs    Fall Risk   Secondary diagnoses (15 pts)  Score: 15    Abuse Screen   Patient is not abused    Examination   Physical Examination  Vitals:    09/14/22 1035   BP: 136/64   Pulse: 78   Resp: 17   Temp: 97.2 °F (36.2 °C)   TempSrc: Temporal   SpO2: 99%   Weight: 180 lb (81.6 kg)   Height: 5' 2\" (1.575 m)      Body mass index is 32.92 kg/m².      Evaluation of Cognitive Function:  Mood/affect:good mood  Appearance:well kempt  Family member/caregiver input: n/a    alert, well appearing, and in no distress, oriented to person, place, and time, and obese    Patient Care Team:  Cortney Umanzor MD as PCP - General (Family Medicine)  Alexi Eldridge MD as PCP - [facility]  Art Vital MD (Rheumatology Internal Medicine)  Ruel Mendoza MD (Internal Medicine Physician)  Jennifer Peralta MD (Gastroenterology)  Zofia Yang MD (Neurology)  Owen Sousa MD as Consulting Provider (Pulmonary Disease)    End-of-life planning  Advanced Directive in the case than an injury or illness causes the patient to be unable to make health care decisions    Health Care Directive or Living Will: no    Advice/Referrals/Counselling/Plan:   Education and counseling provided:  End-of-Life planning (with patient's consent)  Influenza Vaccine  Screening Mammography  Colorectal cancer screening tests  Medical nutrition therapy for individuals with diabetes or renal disease  Include in education list (weight loss, physical activity, smoking cessation, fall prevention, and nutrition)    ICD-10-CM ICD-9-CM    1. Medicare annual wellness visit, subsequent  Z00.00 V70.0 [address]7623 YEOXIN VMall      2. Advance care planning  Z71.89 V65.49 ADVANCE CARE PLANNING FIRST 30 MINS      3. Pulmonary emphysema, unspecified emphysema type ([facility] [address]  J43.9 492.8       4. Type 2 diabetes mellitus with nephropathy (HCC)  E11.21 250.40  DIABETES FOOT EXAM     583.81 MICROALBUMIN, UR, RAND W/ MICROALB/CREAT RATIO      5. Saphenofemoral venous reflux  I87.2 459.81 REFERRAL TO VASCULAR SURGERY      6. Refused influenza vaccine  Z28.21 V64.06       .  Brief written plan, checklist    I have discussed the diagnosis with the patient and the intended plan as seen in the above orders. The patient has received an after-visit summary and questions were answered concerning future plans. I have discussed medication side effects and warnings with the patient as well. I have reviewed the plan of care with the patient, accepted their input and they are in agreement with the treatment goals. ____________________________________________________________    Problem Assessment    for treatment of   Chief Complaint   Patient presents with    Annual Wellness Visit    Leg Swelling    Medication Refill         SUBJECTIVE    Well Adult Physical   Patient here for a comprehensive physical exam.The patient reports problems - swelling, diabetes, hypertension, obesity  Do you take any herbs or supplements that were not prescribed by a doctor? no Are you taking calcium supplements? no Are you taking aspirin daily? not applicable    She c/o lower leg edema which continues after using Lasix. She had some improvement initially but has edema L>R. She notes that the sensation of the L leg feels different as well. Pt checks her glucose 2x/day with numbers ranging in the 130-140s. She denies lightheadedness, dizziness, tremors, diaphoresis, and low blood sugars. Pt is checking her BP 3-4x/week. She is unable to recall what numbers she gets at home. She denies HA, chest pain, and palpitations. Diabetes Mellitus:  She has diabetes mellitus hypertension, hyperlipidemia and obesity. Diabetic ROS - medication compliance: compliant all of the time, diabetic diet compliance: compliant most of the time. Lab review: orders written for new lab studies as appropriate; see orders. Visit Vitals  /64 (BP 1 Location: Left upper arm, BP Patient Position: Sitting, BP Cuff Size: Adult)   Pulse 78   Temp 97.2 °F (36.2 °C) (Temporal)   Resp 17   Ht 5' 2\" (1.575 m)   Wt 180 lb (81.6 kg)   SpO2 99%   BMI 32.92 kg/m²     General:  Alert, cooperative, no distress, appears stated age. Head:  Normocephalic, without obvious abnormality, atraumatic. Eyes:  Conjunctivae/corneas clear. PERRL, EOMs intact   Ears:  Normal TMs and external ear canals both ears. Nose: Nares normal. Septum midline. Mucosa normal. No drainage or sinus tenderness.    Throat: Lips, mucosa, and tongue normal. Teeth and gums normal. Neck: Supple, symmetrical, trachea midline, no adenopathy, thyroid: no enlargement/tenderness/nodules, no carotid bruit and no JVD. Back:   Symmetric, no curvature. ROM normal. No CVA tenderness. Lungs:   Clear to auscultation bilaterally. Chest wall:  No tenderness or deformity. Heart:  Regular rate and rhythm, S1, S2 normal, no murmur, click, rub or gallop. Breast Exam:  Not examined   Abdomen:   Soft, non-tender. Bowel sounds normal. No masses,  No organomegaly. Genitalia:  deferred   Rectal:  deferred   Extremities: Extremities normal, atraumatic, no cyanosis; lower leg 2+ edema. Pulses: 2+ and symmetric all extremities. Skin: Skin color, texture, turgor normal. No rashes or lesions. Lymph nodes: Cervical, supraclavicular, and axillary nodes normal.   Neurologic: CNII-XII intact. Normal strength, sensation and reflexes throughout. Diabetic foot exam:     Left Foot:   Visual Exam: normal    Pulse DP: 2+ (normal)   Filament test: normal sensation       Right Foot:   Visual Exam: normal    Pulse DP: 2+ (normal)   Filament test: normal sensation     LABS     TESTS  Lower extremity doppler- no DVT; + femoral vein reflux    Assessment/Plan:    1. Medicare annual wellness visit, subsequent  Reviewed preventive recommendations  - [address] [place] [address] [place]    2. Advance care planning  See ACP  - ADVANCE CARE PLANNING FIRST 30 MINS    3. Pulmonary emphysema, unspecified emphysema type ([facility] [address]  Continue inhaled therapy    4. Type 2 diabetes mellitus with nephropathy (HCC)  Goal hgb a1c <7; encourage healthy diet and medication compliance  -  DIABETES FOOT EXAM  - MICROALBUMIN, UR, RAND W/ MICROALB/CREAT RATIO; Future    5. Saphenofemoral venous reflux  Referred for further evaluation and treatment  - REFERRAL TO VASCULAR SURGERY    6.  Refused influenza vaccine    Lab review: orders written for new lab studies as appropriate; see orders

## 2022-10-13 ENCOUNTER — OFFICE VISIT (OUTPATIENT)
Dept: PULMONOLOGY | Age: 72
End: 2022-10-13
Payer: MEDICARE

## 2022-10-13 ENCOUNTER — TRANSCRIBE ORDER (OUTPATIENT)
Dept: REGISTRATION | Age: 72
End: 2022-10-13

## 2022-10-13 ENCOUNTER — HOSPITAL ENCOUNTER (OUTPATIENT)
Dept: LAB | Age: 72
Discharge: HOME | End: 2022-10-13
Payer: MEDICARE

## 2022-10-13 VITALS
WEIGHT: 182.2 LBS | OXYGEN SATURATION: 96 % | TEMPERATURE: 97.4 F | RESPIRATION RATE: 18 BRPM | SYSTOLIC BLOOD PRESSURE: 138 MMHG | HEART RATE: 75 BPM | HEIGHT: 62 IN | BODY MASS INDEX: 33.53 KG/M2 | DIASTOLIC BLOOD PRESSURE: 66 MMHG

## 2022-10-13 DIAGNOSIS — J21.9 BRONCHIOLITIS: ICD-10-CM

## 2022-10-13 DIAGNOSIS — J21.9 NECROTIZING BRONCHIOLITIS: Primary | ICD-10-CM

## 2022-10-13 DIAGNOSIS — R91.8 LUNG NODULES: ICD-10-CM

## 2022-10-13 DIAGNOSIS — J21.9 BRONCHIOLITIS: Primary | ICD-10-CM

## 2022-10-13 DIAGNOSIS — K21.9 GASTROESOPHAGEAL REFLUX DISEASE WITHOUT ESOPHAGITIS: ICD-10-CM

## 2022-10-13 PROCEDURE — 1123F ACP DISCUSS/DSCN MKR DOCD: CPT | Performed by: INTERNAL MEDICINE

## 2022-10-13 PROCEDURE — 82785 ASSAY OF IGE: CPT

## 2022-10-13 PROCEDURE — G8752 SYS BP LESS 140: HCPCS | Performed by: INTERNAL MEDICINE

## 2022-10-13 PROCEDURE — G9711 PT HX TOT COL OR COLON CA: HCPCS | Performed by: INTERNAL MEDICINE

## 2022-10-13 PROCEDURE — G8399 PT W/DXA RESULTS DOCUMENT: HCPCS | Performed by: INTERNAL MEDICINE

## 2022-10-13 PROCEDURE — 1101F PT FALLS ASSESS-DOCD LE1/YR: CPT | Performed by: INTERNAL MEDICINE

## 2022-10-13 PROCEDURE — G8427 DOCREV CUR MEDS BY ELIG CLIN: HCPCS | Performed by: INTERNAL MEDICINE

## 2022-10-13 PROCEDURE — G8432 DEP SCR NOT DOC, RNG: HCPCS | Performed by: INTERNAL MEDICINE

## 2022-10-13 PROCEDURE — 99204 OFFICE O/P NEW MOD 45 MIN: CPT | Performed by: INTERNAL MEDICINE

## 2022-10-13 PROCEDURE — G8536 NO DOC ELDER MAL SCRN: HCPCS | Performed by: INTERNAL MEDICINE

## 2022-10-13 PROCEDURE — 36415 COLL VENOUS BLD VENIPUNCTURE: CPT

## 2022-10-13 PROCEDURE — 1090F PRES/ABSN URINE INCON ASSESS: CPT | Performed by: INTERNAL MEDICINE

## 2022-10-13 PROCEDURE — G8417 CALC BMI ABV UP PARAM F/U: HCPCS | Performed by: INTERNAL MEDICINE

## 2022-10-13 PROCEDURE — G9899 SCRN MAM PERF RSLTS DOC: HCPCS | Performed by: INTERNAL MEDICINE

## 2022-10-13 PROCEDURE — 86003 ALLG SPEC IGE CRUDE XTRC EA: CPT

## 2022-10-13 PROCEDURE — G8754 DIAS BP LESS 90: HCPCS | Performed by: INTERNAL MEDICINE

## 2022-10-13 NOTE — PROGRESS NOTES
[facility]  Pulmonary, Critical Care, and Sleep Medicine      Pulmonary Office Initial referral report    Name: Hallie Curiel     : 1950     Date: 10/13/2022        Subjective:   Patient has been referred for evaluation of: Shortness of breath. Patient is a 67 y.o. female states that she started experiencing symptoms of shortness of breath around  with no specific inciting event that she can recollect. She was following up with another pulmonologist were diagnosed her with COPD and over the years she has had several PFTs and currently using Incruse and Symbicort with as needed albuterol. She states that she has never smoked and worked predominantly in an office setting. She has had history of asthma several years ago and does have some seasonal symptoms of sneezing. Patient continues to complain of shortness of breath especially when she first wakes up in the morning and also intermittently through the day. She occasionally has symptoms of chest tightness wheezing and cough. She has not noticed any difference in overall symptoms with her current treatment and in fact feels that the dyspnea is getting worse. She complains of chronic leg edema-work-up is in progress and she has appointment with a specialist in December  She has had previous cardiac work-up which was unremarkable  She denies any difficulty with swallowing  Complains of joint pains-chart carries a diagnosis of rheumatoid arthritis with positive rheumatoid factor but I could not find any confirmatory data  Patient denies having any pets at home-no birds, cats or dogs. She previously has had CT scan done in 2017.       Comorbid conditions include-diabetes type 2, GERD, hypertension and history of rheumatoid arthritis  Occupational exposure-none to any industrial organic or inorganic dust         Review of data:  I have personally reviewed all data-clinical encounters, imaging, outside test results pertinent to patient's care. Testing:  CXR  CT scan-  PFT  Echo    Past Medical History:   Diagnosis Date    Advance directive in chart 7/11/2016    Asthma 12/2/2013    GERD (gastroesophageal reflux disease) 4/29/2011    HTN (hypertension) 4/29/2011    Rheumatoid arthritis(714.0) 4/29/2011    Type II or unspecified type diabetes mellitus without mention of complication, not stated as uncontrolled 4/29/2011       Past Surgical History:   Procedure Laterality Date    HX BREAST BIOPSY Right 1970's    rt benign    HX HYSTERECTOMY      HX NARCISO AND BSO  2004       Social History     Socioeconomic History    Marital status:    Tobacco Use    Smoking status: Never    Smokeless tobacco: Never   Substance and Sexual Activity    Alcohol use: No    Drug use: No    Sexual activity: Not Currently       Family History   Problem Relation Age of Onset    Hypertension Mother     Arthritis-rheumatoid Mother     Stroke Father     OSTEOARTHRITIS Sister     Stroke Brother 77       Allergies   Allergen Reactions    Percocet [Oxycodone-Acetaminophen] Nausea and Vomiting    Ultram [Tramadol] Swelling       . Current Outpatient Medications   Medication Sig Dispense Refill    albuterol (PROVENTIL HFA, VENTOLIN HFA, PROAIR HFA) 90 mcg/actuation inhaler Take 2 Puffs by inhalation every six (6) hours as needed for Wheezing or Shortness of Breath. 2 Each 2    furosemide (LASIX) 40 mg tablet Take 1 Tablet by mouth two (2) times a day. 180 Tablet 3    budesonide-formoteroL (Symbicort) 160-4.5 mcg/actuation HFAA TAKE 2 PUFFS BY MOUTH TWO TIMES A DAY. 3 Each 3    umeclidinium (Incruse Ellipta) 62.5 mcg/actuation inhaler TAKE 1 PUFF BY MOUTH EVERY DAY 3 Each 3    glucose blood VI test strips (True Metrix Glucose Test Strip) strip TEST BLOOD GLUCOSE ONCE A  Strip 3    lancets 30 gauge misc Test blood glucose twice a day 100 Each 3    potassium chloride (KLOR-CON M10) 10 mEq tablet Take 1 Tablet by mouth two (2) times a day.  180 Tablet 3 cetirizine (ZYRTEC) 10 mg tablet TAKE 1 TABLET BY MOUTH EVERY DAY 90 Tablet 3    montelukast (SINGULAIR) 10 mg tablet Take 1 Tablet by mouth daily. 90 Tablet 3    simvastatin (Zocor) 20 mg tablet Take 1 Tablet by mouth nightly. 90 Tablet 3    valsartan (DIOVAN) 80 mg tablet Take 1 Tablet by mouth daily. 90 Tablet 3    linaGLIPtin (Tradjenta) 5 mg tablet Take 1 Tablet by mouth daily. 90 Tablet 3    Blood-Glucose Meter (True Metrix Glucose Meter) misc Test blood glucose once a day 1 Each 0    gabapentin (NEURONTIN) 100 mg capsule Take 1 Capsule by mouth nightly. Max Daily Amount: 100 mg.  (Patient not taking: Reported on 10/13/2022) 90 Capsule 1         Review of Systems:  HEENT: No epistaxis, no nasal drainage, no difficulty in swallowing, no redness in eyes  Respiratory: as above  Cardiovascular: no chest pain, no palpitations, no chronic leg edema, no syncope  Gastrointestinal: no abd pain, no vomiting, no diarrhea, no bleeding symptoms  Genitourinary: No urinary symptoms or hematuria  Integument/breast: No ulcers or rashes  Musculoskeletal:Neg  Neurological: No focal weakness, no seizures, no headaches  Behvioral/Psych: No anxiety, no depression  Constitutional: No fever, no chills, no weight loss, no night sweats     Objective:   Visit Vitals  /66 (BP 1 Location: Right upper arm, BP Patient Position: Sitting, BP Cuff Size: Large adult)   Pulse 75   Temp 97.4 °F (36.3 °C) (Oral)   Resp 18   Ht 5' 2\" (1.575 m)   Wt 82.6 kg (182 lb 3.2 oz)   SpO2 96% Comment: RA Rest   BMI 33.32 kg/m²        Physical Exam:   General: comfortable, no acute distress  HEENT: pupils reactive, sclera anicteric, EOM intact  Neck: No adenopathy or thyroid swelling, no lymphadenopathy or JVD, supple  CVS: S1S2 no murmurs  RS: Mod AE bilaterally, no tactile fremitus or egophony, no accessory muscle use  Abd: soft, non tender, no hepatosplenomegaly  Neuro: non focal, awake, alert  Extrm: no leg edema, clubbing or cyanosis  Skin: no rash    Data review:   Pertinent labs: CBC, BMP, LFT's    PFT:    Date FVC FEV1  FEV1/FVC [id number] TLC RV RV/TLC VC DLCO   8/25/2022 67% 66% Reduced 47%                                              6 min walk test; not done      Results for orders placed or performed in visit on 05/24/12   AMB POC EKG ROUTINE W/ 12 LEADS, INTER & REP    Narrative    NSR         Imaging:  I have personally reviewed the patients radiographs and have reviewed the reports:  XR Results (most recent):  Results from Hospital Encounter encounter on 01/10/17    XR KNEE LT MIN 4 V    Narrative  EXAM: Left knee, 4 views    INDICATION: Left anterior/patellar knee pain for 4-5 months with no trauma. COMPARISON: None.    _______________    FINDINGS:    No fracture or subluxation. No joint effusion or osseous lesion. A small  enthesophyte is present at the quadriceps insertion upon the superior pole of  the patella.    _______________    Impression  IMPRESSION:    Small quadriceps insertion patellar enthesophyte. CT Results (most recent):  Results from Hospital Encounter encounter on 05/04/17    CT CHEST WO CONT    Narrative  EXAM: High-resolution Chest CT    INDICATION: Disorder of lung, history of asthma and rheumatoid arthritis history  of pulmonary nodule with greater than two-year stability. COMPARISON: CT scan of the chest performed 06/13/2016, 03/02/15, 06/3/2014 and  03/17/2014. TECHNIQUE: CT acquisition from the thoracic inlet through the diaphragm without  IV contrast. High-resolution protocol performed including inspiratory,  expiratory and prone acquisitions. Multiplanar reformats were generated. Dose reduction techniques used: Automated exposure control, adjustment of the  mAs and/or kVp according to patient's size, and iterative reconstruction  techniques. _______________    FINDINGS:    LUNGS: No evidence of pulmonary architectural distortion. No honeycombing or  significant reticular opacities.  Scattered ill-defined centrilobular nodules  predominantly involving both mid to upper lungs, with a few centrilobular  nodules in the right lower lung. Left lower lung. No evidence of air-trapping  during expiration. No bronchiectasis. No new or suspicious developing pulmonary nodule/mass. Stable pulmonary nodule  in both lungs are not significantly changed from the prior examination,  demonstrating. The two-year stability. PLEURA: Normal.    MEDIASTINUM: Normal heart size. No pericardial effusion. Small hiatus hernia. Normal caliber thoracic aorta. LYMPH NODES: No enlarged lymph nodes. UPPER ABDOMEN: No acute abnormality. Nonobstructive interpolar 1-2 mm left renal  calculus. .    OTHER: No acute or aggressive osseous abnormalities identified. _______________    Impression  IMPRESSION:    1. Centrilobular nodules with a random pattern, nonspecific finding. Diagnostic  considerations would include hypersensitivity pneumonitis, infectious airways  disease or potentially respiratory bronchiolitis if there is a smoking history. 2. No evidence to support UIP/IPF. 3. Multiple stable bilateral pulmonary nodules demonstrating greater than  two-year stability as previously stated on the prior study from 06/13/2016. No  imaging follow-up is recommended. 4. Small hiatus hernia. 5. Nonobstructive left renal calculus. .     Patient Active Problem List   Diagnosis Code    GERD (gastroesophageal reflux disease) K21.9    HTN (hypertension) I10    Rheumatoid arthritis involving multiple sites with positive rheumatoid factor (HCC) M05.79    Pure hypercholesterolemia E78.00    Advance directive in chart Z78.9    Type 2 diabetes mellitus with nephropathy ([facility] [address] E11.21    Pulmonary emphysema ([address] J43.9     IMPRESSION:   Dyspnea-chronic cough several years duration with progression noted subjectively by patient. Has been previously diagnosed with COPD however patient is a never smoker.   She does have symptoms suggestive of chronic reactive airways-likely bronchiolitis/asthma with progression to fixed airways obstruction. PFTs over several years have shown predominant moderate obstructive and restrictive impairment. CT imaging 2017 reports centrilobular nodules suggestive of bronchiolitis with some additional stable nodules. Question component of chronic hypersensitivity pneumonitis/rheumatoid bronchiolitis. Symptomatically fair control with current treatment-Symbicort and Incruse with as needed albuterol. Needs further evaluation to check progression of structural lung impairment. Also etiologic work-up needed  History of DM2  History of GERD and history of rheumatoid arthritis documented on chart      RECOMMENDATIONS:   Discussed with patient findings on work-up so far and need for further evaluation  Ordering IgE level, allergen panel  Will order HRCT and if still presence of ILD pursue further work-up for ILD, follow-up on diagnosis of rheumatoid arthritis  Continue current bronchodilators until process better defined-Incruse, Symbicort and as needed albuterol  Will consider referral to pulmonary rehab  Preventive vaccinations  Follow-up in 4 to 6 weeks after work-up completed     Health maintenance screens deferred to Primary care provider. Renetta Stahl MD    This patient has a high complexity chronic care condition   This Visit needed Moderate/High complexity medically necessary decision making and management plans. Time spent in preparing for the visit-review of history, tests done prior to arrival, additional time reviewing clinical data, imaging, outside records and test results as well as time spent in ordering tests, treatments and referring patient for further care was a total of 20 minutes. Additional time-counseling with patient and family members regarding care plan 20 minutes. Please note that this dictation was completed with [employer], the [employer] voice recognition software. Quite often unanticipated grammatical, syntax, homophones, and other interpretive errors are inadvertently transcribed by the computer software. Please disregard these errors. Please excuse any errors that have escaped final proofreading.

## 2022-10-13 NOTE — PROGRESS NOTES
Joseph Keene presents today for   Chief Complaint   Patient presents with    Results     PFT      Shortness of Breath       Is someone accompanying this pt? No    Is the patient using any DME equipment during OV? No    -DME Company NA    Depression Screening:  3 most recent PHQ Screens 9/14/2022   Little interest or pleasure in doing things Not at all   Feeling down, depressed, irritable, or hopeless Not at all   Total Score PHQ 2 0       Learning Assessment:  Learning Assessment 12/2/2013   PRIMARY LEARNER Patient   PRIMARY LANGUAGE ENGLISH   LEARNER PREFERENCE PRIMARY LISTENING   ANSWERED BY patient   RELATIONSHIP SELF       Abuse Screening:  Abuse Screening Questionnaire 9/9/2020   Do you ever feel afraid of your partner? N   Are you in a relationship with someone who physically or mentally threatens you? N   Is it safe for you to go home? Y       Fall Risk  Fall Risk Assessment, last 12 mths 9/14/2022   Able to walk? Yes   Fall in past 12 months? 0   Do you feel unsteady? 0   Are you worried about falling 0         Coordination of Care:  1. Have you been to the ER, urgent care clinic since your last visit? Hospitalized since your last visit? No    2. Have you seen or consulted any other health care providers outside of the [address] [place] [address] since your last visit? Include any pap smears or colon screening.  No

## 2022-10-13 NOTE — LETTER
10/13/2022    Patient: Shai Jamil   YOB: 1950   Date of Visit: 10/13/2022     Nain Ge MD  [id number] [facility]  [address]    Dear Nain Ge MD,      Thank you for referring Ms. Rome Layne to [facility] [place] for evaluation. My notes for this consultation are attached. If you have questions, please do not hesitate to call me. I look forward to following your patient along with you.       Sincerely,    Yoon Velez MD

## 2022-10-19 ENCOUNTER — HOSPITAL ENCOUNTER (OUTPATIENT)
Dept: CT IMAGING | Age: 72
Discharge: HOME | End: 2022-10-19
Attending: INTERNAL MEDICINE
Payer: MEDICARE

## 2022-10-19 DIAGNOSIS — R91.8 LUNG NODULES: ICD-10-CM

## 2022-10-19 DIAGNOSIS — J21.9 BRONCHIOLITIS: ICD-10-CM

## 2022-10-19 PROCEDURE — 71250 CT THORAX DX C-: CPT

## 2022-10-20 LAB
A ALTERNATA IGE QN: <0.1 KU/L
A FUMIGATUS IGE QN: <0.1 KU/L
AMER ROACH IGE QN: 0.57 KU/L
AMER SYCAMORE IGE QN: <0.1 KU/L
BAHIA GRASS IGE QN: <0.1 KU/L
BERMUDA GRASS IGE QN: 0.29 KU/L
BOXELDER IGE QN: <0.1 KU/L
C HERBARUM IGE QN: <0.1 KU/L
CAT DANDER IGG QN: <0.1 KU/L
CLASS DESCRIPTION, 600268: ABNORMAL
COMMON RAGWEED IGE QN: 0.27 KU/L
D FARINAE IGE QN: 1.42 KU/L
D PTERONYSS IGE QN: 1.43 KU/L
DEPRECATED IGE QN: <0.1 KU/L
DOG DANDER IGE QN: <0.1 KU/L
ENGL PLANTAIN IGE QN: <0.1 KU/L
IGE SERPL-ACNC: 96 IU/ML (ref 6–495)
JOHNSON GRASS IGE QN: <0.1 KU/L
M RACEMOSUS IGE QN: <0.1 KU/L
MT JUNIPER IGE QN: 0.17 KU/L
MUGWORT IGE QN: <0.1 KU/L
NETTLE IGE QN: <0.1 KU/L
P NOTATUM IGE QN: <0.1 KU/L
S BOTRYOSUM IGE QN: <0.1 KU/L
SHEEP SORREL IGE QN: <0.1 KU/L
SWEET GUM IGE QN: <0.1 KU/L
TIMOTHY IGE QN: <0.1 KU/L
WHITE BIRCH IGE QN: <0.1 KU/L
WHITE ELM IGG QN: <0.1 KU/L
WHITE HICKORY IGE QN: <0.1 KU/L
WHITE MULBERRY IGE QN: <0.1 KU/L
WHITE OAK IGE QN: <0.1 KU/L

## 2022-10-20 NOTE — ACP (ADVANCE CARE PLANNING)
Advance Care Planning     Advance Care Planning (ACP) Physician/NP/PA Conversation      Date of Conversation: 9/14/2022  Conducted with: Patient with Decision Making Capacity    Healthcare Decision Maker:   No healthcare decision makers have been documented. Click here to complete [address] including selection of the Healthcare Decision Maker Relationship (ie \"Primary\")    Today we documented Decision Maker(s) consistent with Legal Next of Kin hierarchy. Care Preferences:    Hospitalization: \"If your health worsens and it becomes clear that your chance of recovery is unlikely, what would be your preference regarding hospitalization? \"  The patient would prefer hospitalization. Ventilation: \"If you were unable to breathe on your own and your chance of recovery was unlikely, what would be your preference about the use of a ventilator (breathing machine) if it was available to you? \"   The patient would desire the use of a ventilator. Resuscitation: \"In the event your heart stopped as a result of an underlying serious health condition, would you want attempts to be made to restart your heart, or would you prefer a natural death? \"   Yes, attempt to resuscitate.     Additional topics discussed: treatment goals, benefit/burden of treatment options, ventilation preferences, hospitalization preferences, and resuscitation preferences    Conversation Outcomes / Follow-Up Plan:   ACP complete - no further action today  Reviewed DNR/DNI and patient elects Full Code (Attempt Resuscitation)     Length of Voluntary ACP Conversation in minutes:  16 minutes    Lucie Mc MD

## 2022-10-27 NOTE — PROGRESS NOTES
Allergen profile results noted with class III positive test to dust mite ,Class II positive test to American cockroachIgE normal  Will discuss action plan at next visit

## 2022-10-27 NOTE — PROGRESS NOTES
Called patient [phone]  No answer-left message on answering machine-we will discuss at appointment.   CT scan chest  Multiple stable nodules since 2016    No immediate action needed  We will follow-up at appointment

## 2022-12-02 ENCOUNTER — OFFICE VISIT (OUTPATIENT)
Dept: PULMONOLOGY | Age: 72
End: 2022-12-02
Payer: MEDICARE

## 2022-12-02 VITALS
HEART RATE: 77 BPM | SYSTOLIC BLOOD PRESSURE: 142 MMHG | RESPIRATION RATE: 18 BRPM | TEMPERATURE: 98.5 F | DIASTOLIC BLOOD PRESSURE: 66 MMHG | OXYGEN SATURATION: 96 % | HEIGHT: 62 IN | BODY MASS INDEX: 34.23 KG/M2 | WEIGHT: 186 LBS

## 2022-12-02 DIAGNOSIS — M05.79 RHEUMATOID ARTHRITIS INVOLVING MULTIPLE SITES WITH POSITIVE RHEUMATOID FACTOR (HCC): ICD-10-CM

## 2022-12-02 DIAGNOSIS — R91.8 LUNG NODULES: ICD-10-CM

## 2022-12-02 DIAGNOSIS — J45.909 MILD ASTHMA WITHOUT COMPLICATION, UNSPECIFIED WHETHER PERSISTENT: Primary | ICD-10-CM

## 2022-12-02 DIAGNOSIS — R06.02 SOB (SHORTNESS OF BREATH) ON EXERTION: ICD-10-CM

## 2022-12-02 NOTE — PROGRESS NOTES
Javad Grant presents today for   Chief Complaint   Patient presents with    Shortness of Breath    Results     Labs       Is someone accompanying this pt? No    Is the patient using any DME equipment during OV? No    -DME Company NA    Depression Screening:  3 most recent PHQ Screens 9/14/2022   Little interest or pleasure in doing things Not at all   Feeling down, depressed, irritable, or hopeless Not at all   Total Score PHQ 2 0       Learning Assessment:  Learning Assessment 12/2/2013   PRIMARY LEARNER Patient   PRIMARY LANGUAGE ENGLISH   LEARNER PREFERENCE PRIMARY LISTENING   ANSWERED BY patient   RELATIONSHIP SELF       Abuse Screening:  Abuse Screening Questionnaire 9/9/2020   Do you ever feel afraid of your partner? N   Are you in a relationship with someone who physically or mentally threatens you? N   Is it safe for you to go home? Y       Fall Risk  Fall Risk Assessment, last 12 mths 9/14/2022   Able to walk? Yes   Fall in past 12 months? 0   Do you feel unsteady? 0   Are you worried about falling 0         Coordination of Care:  1. Have you been to the ER, urgent care clinic since your last visit? Hospitalized since your last visit? No    2. Have you seen or consulted any other health care providers outside of the [address] [place] [address] since your last visit? Include any pap smears or colon screening.  No

## 2022-12-02 NOTE — PROGRESS NOTES
[facility]  Pulmonary, Critical Care, and Sleep Medicine      Pulmonary Office follow up    Name: Lisa Buck     : 1950     Date: 2022        Subjective:   Patient has been referred for evaluation of: Shortness of breath. 22   Here for follow-up   She continues to have symptoms of shortness of breath with activity-finds it difficult to go grocery shopping or walking any distance  Using her Symbicort and Incruse  Does have some intermittent cough  She had follow-up CT scan of chest, allergen testing and spirometry and here to discuss further    HPI  Patient is a 67 y.o. female states that she started experiencing symptoms of shortness of breath around  with no specific inciting event that she can recollect. She was following up with another pulmonologist were diagnosed her with COPD and over the years she has had several PFTs and currently using Incruse and Symbicort with as needed albuterol. She states that she has never smoked and worked predominantly in an office setting. She has had history of asthma several years ago and does have some seasonal symptoms of sneezing. Patient continues to complain of shortness of breath especially when she first wakes up in the morning and also intermittently through the day. She occasionally has symptoms of chest tightness wheezing and cough. She has not noticed any difference in overall symptoms with her current treatment and in fact feels that the dyspnea is getting worse. She complains of chronic leg edema-work-up is in progress and she has appointment with a specialist in December  She has had previous cardiac work-up which was unremarkable  She denies any difficulty with swallowing  Complains of joint pains-chart carries a diagnosis of rheumatoid arthritis with positive rheumatoid factor but I could not find any confirmatory data  Patient denies having any pets at home-no birds, cats or dogs.   She previously has had CT scan done in 2017. Comorbid conditions include-diabetes type 2, GERD, hypertension and history of rheumatoid arthritis  Occupational exposure-none to any industrial organic or inorganic dust      Review of data:  I have personally reviewed all data-clinical encounters, imaging, outside test results pertinent to patient's care. Testing:  CXR  CT scan-  PFT  Echo    Past Medical History:   Diagnosis Date    Advance directive in chart 7/11/2016    Asthma 12/2/2013    GERD (gastroesophageal reflux disease) 4/29/2011    HTN (hypertension) 4/29/2011    Rheumatoid arthritis(714.0) 4/29/2011    Type II or unspecified type diabetes mellitus without mention of complication, not stated as uncontrolled 4/29/2011       Past Surgical History:   Procedure Laterality Date    HX BREAST BIOPSY Right 1970's    rt benign    HX HYSTERECTOMY      HX NARCISO AND BSO  2004     Allergies   Allergen Reactions    Percocet [Oxycodone-Acetaminophen] Nausea and Vomiting    Ultram [Tramadol] Swelling     Current Outpatient Medications   Medication Sig Dispense Refill    albuterol (PROVENTIL HFA, VENTOLIN HFA, PROAIR HFA) 90 mcg/actuation inhaler Take 2 Puffs by inhalation every six (6) hours as needed for Wheezing or Shortness of Breath. 2 Each 2    furosemide (LASIX) 40 mg tablet Take 1 Tablet by mouth two (2) times a day. 180 Tablet 3    budesonide-formoteroL (Symbicort) 160-4.5 mcg/actuation HFAA TAKE 2 PUFFS BY MOUTH TWO TIMES A DAY. 3 Each 3    umeclidinium (Incruse Ellipta) 62.5 mcg/actuation inhaler TAKE 1 PUFF BY MOUTH EVERY DAY 3 Each 3    glucose blood VI test strips (True Metrix Glucose Test Strip) strip TEST BLOOD GLUCOSE ONCE A  Strip 3    lancets 30 gauge misc Test blood glucose twice a day 100 Each 3    potassium chloride (KLOR-CON M10) 10 mEq tablet Take 1 Tablet by mouth two (2) times a day.  180 Tablet 3    cetirizine (ZYRTEC) 10 mg tablet TAKE 1 TABLET BY MOUTH EVERY DAY 90 Tablet 3    montelukast (SINGULAIR) 10 mg tablet Take 1 Tablet by mouth daily. 90 Tablet 3    simvastatin (Zocor) 20 mg tablet Take 1 Tablet by mouth nightly. 90 Tablet 3    valsartan (DIOVAN) 80 mg tablet Take 1 Tablet by mouth daily. 90 Tablet 3    linaGLIPtin (Tradjenta) 5 mg tablet Take 1 Tablet by mouth daily. 90 Tablet 3    Blood-Glucose Meter (True Metrix Glucose Meter) misc Test blood glucose once a day 1 Each 0    gabapentin (NEURONTIN) 100 mg capsule Take 1 Capsule by mouth nightly. Max Daily Amount: 100 mg.  (Patient not taking: No sig reported) 90 Capsule 1     Review of Systems:  HEENT: No epistaxis, no nasal drainage, no difficulty in swallowing, no redness in eyes  Respiratory: as above  Cardiovascular: no chest pain, no palpitations, no chronic leg edema, no syncope  Gastrointestinal: no abd pain, no vomiting, no diarrhea, no bleeding symptoms  Genitourinary: No urinary symptoms or hematuria  Integument/breast: No ulcers or rashes  Musculoskeletal:Neg  Neurological: No focal weakness, no seizures, no headaches  Behvioral/Psych: No anxiety, no depression  Constitutional: No fever, no chills, no weight loss, no night sweats     Objective:   Visit Vitals  BP (!) 142/66 (BP 1 Location: Right upper arm, BP Patient Position: Sitting, BP Cuff Size: Large adult)   Pulse 77   Temp 98.5 °F (36.9 °C) (Oral)   Resp 18   Ht 5' 2\" (1.575 m)   Wt 84.4 kg (186 lb)   SpO2 96% Comment: RA Rest   BMI 34.02 kg/m²        Physical Exam:   General: comfortable, no acute distress  HEENT: pupils reactive, sclera anicteric, EOM intact  Neck: No adenopathy or thyroid swelling, no lymphadenopathy or JVD, supple  CVS: S1S2 no murmurs  RS: Mod AE bilaterally, no tactile fremitus or egophony, no accessory muscle use  Abd: soft, non tender, no hepatosplenomegaly  Neuro: non focal, awake, alert  Extrm: no leg edema, clubbing or cyanosis  Skin: no rash    Data review:   Pertinent labs: CBC, BMP, LFT's    [facility] allergen panel-  Contains abnormal data ALLERGEN PROFILE, ZONE 2  Order: [id number]  Collected 10/13/2022 12:23    Status: Final result    Next appt: 03/14/2023 at 10:00 AM in [facility] Angie Lucero MD)    Dx: Bronchiolitis    1 Result Note  Component Ref Range & Units 10/13/22 1223    D. pteronyssinus Class III kU/L 1.43 Abnormal     D. farinae Mite Class III kU/L 1.42 Abnormal     Cat Hair/Dander Class 0 kU/L <0.10    Dog Hair/Dander Class 0 kU/L <0.10    [place] Grass Class 0/I kU/L 0.29 Abnormal     Junior grass Class 0 kU/L <0.10    [place] Grass Class 0 kU/L <0.10    [place] Grass Class 0 kU/L <0.10    Cockroach,American Class II kU/L 0.57 Abnormal          IGE-96-normal  PFT:    Date FVC FEV1  FEV1/FVC [id number] TLC RV RV/TLC VC DLCO   8/25/2022 67% 66% Reduced 47%                                              6 min walk test; not done      Results for orders placed or performed in visit on 05/24/12   AMB POC EKG ROUTINE W/ 12 LEADS, INTER & REP    Narrative    NSR         Imaging:  I have personally reviewed the patients radiographs and have reviewed the reports:  XR Results (most recent):  Results from Hospital Encounter encounter on 01/10/17    XR KNEE LT MIN 4 V    Narrative  EXAM: Left knee, 4 views    INDICATION: Left anterior/patellar knee pain for 4-5 months with no trauma. COMPARISON: None.    _______________    FINDINGS:    No fracture or subluxation. No joint effusion or osseous lesion. A small  enthesophyte is present at the quadriceps insertion upon the superior pole of  the patella.    _______________    Impression  IMPRESSION:    Small quadriceps insertion patellar enthesophyte. CT Results (most recent):  Results from [place] encounter on 10/19/22    CT CHEST WO CONT    Narrative  EXAM: CT CHEST WITHOUT CONTRAST. CLINICAL HISTORY/INDICATION:  Pulmonary nodules, bronchiolitis    COMPARISON: CT chest 6/13/2016.     TECHNIQUE: Standard helical images were obtained from the thoracic inlet through  the adrenals at 5 mm thick sections without intravenous contrast.  Coronal and sagittal reformations obtained. Images were reviewed on both soft  tissue, lung, and bone window settings. All CT scans at this facility are performed using dose optimization technique as  appropriate to a performed exam, to include automated exposure control,  adjustment of the mA and/or kV according to patient's size (including  appropriate matching for site-specific examinations), or use of iterative  reconstruction technique. FINDINGS:    Right lower lobe-  3 mm nodule image 21 series 3 just posterior to the fissure is stable. 4 mm nodule based on the medial pleura image 22 stable. 3 mm nodule either in the right lower lobe or along the fissure image 23 stable. Right middle lobe-  5 mm nodule image 27 stable. Right lower lobe-  3 mm lateral pleural nodule or parenchymal nodule image 29 stable. Left upper lobe-  4 mm pulmonary nodule along the superior major fissure, image 13, stable. Left lower lobe-  Poorly seen nodule image 21 stable. 5 mm nodule image 34 stable. No new nodules demonstrated. There is no evidence of mediastinal, hilar, nor axillary adenopathy. Evaluation of the mediastinum and jalen is limited by the lack of IV contrast.  The great vessels and thoracic aorta are unremarkable. There are no pleural effusions. The included portion of the of the liver is unremarkable. The adrenal glands are normal.    Mild motion does limit detail. .    The bony structures are unremarkable. Impression  Multiple bilateral pulmonary nodules, benign, stable since 2016 exam.  Small left renal nonobstructing calculus.     .     Patient Active Problem List   Diagnosis Code    GERD (gastroesophageal reflux disease) K21.9    HTN (hypertension) I10    Rheumatoid arthritis involving multiple sites with positive rheumatoid factor (HCC) M05.79    Pure hypercholesterolemia E78.00    Asthma J45.909    Advance directive in chart Z78.9    Type 2 diabetes mellitus with nephropathy ([facility]) E11.21    Pulmonary emphysema ([facility]) J43.9    Lung nodules R91.8     IMPRESSION:   Dyspnea-chronic cough several years duration with progression noted subjectively by patient. Has been previously diagnosed with COPD however patient is a never smoker. She does have symptoms suggestive of chronic reactive airways-likely bronchiolitis/asthma with progression to fixed airways obstruction. PFTs over several years have shown predominant moderate obstructive and restrictive impairment. Clinical symptoms and data suggest diagnosis of asthma -COPD overlap. [facility] allergen panel suggests allergic component -dust mite predominantly. CT imaging 2017 reports centrilobular nodules suggestive of bronchiolitis with some additional stable nodules. Lung nodules-stable dating back to 2016. Likely rheumatoid  History of DM2  History of GERD and history of rheumatoid arthritis documented on chart      RECOMMENDATIONS:   Discussed with patient findings on work-up so far-needs stricter trigger control. Dust mite being likely culprit. Instructed on cleaning surfaces, wearing mask during testing and if possible have noncarpeted living areas  Will monitor response to environmental control measures  Continue current bronchodilators until process better defined-Incruse, Symbicort and as needed albuterol  Will benefit from referral to pulmonary rehab-reconditioning  We will follow-up imaging as clinically relevant  Preventive vaccinations  Follow-up in 4 months     Health maintenance screens deferred to Primary care provider. Rajesh Venegas MD    This patient has a high complexity chronic care condition   This Visit needed Moderate/High complexity medically necessary decision making and management plans.     Time spent in preparing for the visit-review of history, tests done prior to arrival, additional time reviewing clinical data, imaging, outside records and test results as well as time spent in ordering tests, treatments and referring patient for further care was a total of 20 minutes. Additional time-counseling with patient and family members regarding care plan 20 minutes. Please note that this dictation was completed with LATTO, the computer voice recognition software. Quite often unanticipated grammatical, syntax, homophones, and other interpretive errors are inadvertently transcribed by the computer software. Please disregard these errors. Please excuse any errors that have escaped final proofreading.

## 2023-01-28 ENCOUNTER — TRANSCRIBE ORDERS (OUTPATIENT)
Facility: HOSPITAL | Age: 73
End: 2023-01-28

## 2023-01-28 DIAGNOSIS — Z12.31 VISIT FOR SCREENING MAMMOGRAM: Primary | ICD-10-CM

## 2023-02-04 DIAGNOSIS — J21.9 NECROTIZING BRONCHIOLITIS: Primary | ICD-10-CM

## 2023-03-09 RX ORDER — FUROSEMIDE 20 MG/1
TABLET ORAL
Qty: 180 TABLET | Refills: 0 | Status: SHIPPED | OUTPATIENT
Start: 2023-03-09

## 2023-03-14 ENCOUNTER — OFFICE VISIT (OUTPATIENT)
Facility: CLINIC | Age: 73
End: 2023-03-14
Payer: MEDICARE

## 2023-03-14 ENCOUNTER — HOSPITAL ENCOUNTER (OUTPATIENT)
Facility: HOSPITAL | Age: 73
Setting detail: SPECIMEN
Discharge: HOME | End: 2023-03-17
Payer: MEDICARE

## 2023-03-14 VITALS
DIASTOLIC BLOOD PRESSURE: 66 MMHG | SYSTOLIC BLOOD PRESSURE: 128 MMHG | TEMPERATURE: 97.8 F | OXYGEN SATURATION: 96 % | HEART RATE: 77 BPM | BODY MASS INDEX: 34.6 KG/M2 | WEIGHT: 188 LBS | RESPIRATION RATE: 17 BRPM | HEIGHT: 62 IN

## 2023-03-14 DIAGNOSIS — M05.79 RHEUMATOID ARTHRITIS WITH RHEUMATOID FACTOR OF MULTIPLE SITES WITHOUT ORGAN OR SYSTEMS INVOLVEMENT (HCC): ICD-10-CM

## 2023-03-14 DIAGNOSIS — E73.9 LACTOSE INTOLERANCE: ICD-10-CM

## 2023-03-14 DIAGNOSIS — E11.21 TYPE 2 DIABETES MELLITUS WITH DIABETIC NEPHROPATHY, WITHOUT LONG-TERM CURRENT USE OF INSULIN (HCC): ICD-10-CM

## 2023-03-14 DIAGNOSIS — E78.00 HYPERCHOLESTEROLEMIA: ICD-10-CM

## 2023-03-14 DIAGNOSIS — I10 PRIMARY HYPERTENSION: Primary | ICD-10-CM

## 2023-03-14 DIAGNOSIS — J30.1 SEASONAL ALLERGIC RHINITIS DUE TO POLLEN: ICD-10-CM

## 2023-03-14 DIAGNOSIS — J43.1 PANLOBULAR EMPHYSEMA (HCC): ICD-10-CM

## 2023-03-14 DIAGNOSIS — R60.0 LOWER EXTREMITY EDEMA: ICD-10-CM

## 2023-03-14 LAB
ALBUMIN SERPL-MCNC: 4 G/DL (ref 3.4–5)
ALBUMIN/GLOB SERPL: 1.1 (ref 0.8–1.7)
ALP SERPL-CCNC: 142 U/L (ref 45–117)
ALT SERPL-CCNC: 27 U/L (ref 13–56)
ANION GAP SERPL CALC-SCNC: 3 MMOL/L (ref 3–18)
AST SERPL-CCNC: 21 U/L (ref 10–38)
BILIRUB SERPL-MCNC: 0.6 MG/DL (ref 0.2–1)
BUN SERPL-MCNC: 16 MG/DL (ref 7–18)
BUN/CREAT SERPL: 18 (ref 12–20)
CALCIUM SERPL-MCNC: 9.5 MG/DL (ref 8.5–10.1)
CHLORIDE SERPL-SCNC: 108 MMOL/L (ref 100–111)
CHOLEST SERPL-MCNC: 123 MG/DL
CO2 SERPL-SCNC: 31 MMOL/L (ref 21–32)
CREAT SERPL-MCNC: 0.91 MG/DL (ref 0.6–1.3)
CREAT UR-MCNC: 233 MG/DL (ref 30–125)
GLOBULIN SER CALC-MCNC: 3.8 G/DL (ref 2–4)
GLUCOSE SERPL-MCNC: 99 MG/DL (ref 74–99)
HDLC SERPL-MCNC: 48 MG/DL (ref 40–60)
HDLC SERPL: 2.6 (ref 0–5)
LDLC SERPL CALC-MCNC: 46 MG/DL (ref 0–100)
LIPID PANEL: NORMAL
MICROALBUMIN UR-MCNC: 1.91 MG/DL (ref 0–3)
MICROALBUMIN/CREAT UR-RTO: 8 MG/G (ref 0–30)
POTASSIUM SERPL-SCNC: 3.9 MMOL/L (ref 3.5–5.5)
PROT SERPL-MCNC: 7.8 G/DL (ref 6.4–8.2)
SODIUM SERPL-SCNC: 142 MMOL/L (ref 136–145)
TRIGL SERPL-MCNC: 145 MG/DL
VLDLC SERPL CALC-MCNC: 29 MG/DL

## 2023-03-14 PROCEDURE — 36415 COLL VENOUS BLD VENIPUNCTURE: CPT

## 2023-03-14 PROCEDURE — 99214 OFFICE O/P EST MOD 30 MIN: CPT | Performed by: FAMILY MEDICINE

## 2023-03-14 PROCEDURE — G8417 CALC BMI ABV UP PARAM F/U: HCPCS | Performed by: FAMILY MEDICINE

## 2023-03-14 PROCEDURE — 1036F TOBACCO NON-USER: CPT | Performed by: FAMILY MEDICINE

## 2023-03-14 PROCEDURE — 2022F DILAT RTA XM EVC RTNOPTHY: CPT | Performed by: FAMILY MEDICINE

## 2023-03-14 PROCEDURE — 3017F COLORECTAL CA SCREEN DOC REV: CPT | Performed by: FAMILY MEDICINE

## 2023-03-14 PROCEDURE — 1090F PRES/ABSN URINE INCON ASSESS: CPT | Performed by: FAMILY MEDICINE

## 2023-03-14 PROCEDURE — 1123F ACP DISCUSS/DSCN MKR DOCD: CPT | Performed by: FAMILY MEDICINE

## 2023-03-14 PROCEDURE — 3078F DIAST BP <80 MM HG: CPT | Performed by: FAMILY MEDICINE

## 2023-03-14 PROCEDURE — 3046F HEMOGLOBIN A1C LEVEL >9.0%: CPT | Performed by: FAMILY MEDICINE

## 2023-03-14 PROCEDURE — G8427 DOCREV CUR MEDS BY ELIG CLIN: HCPCS | Performed by: FAMILY MEDICINE

## 2023-03-14 PROCEDURE — G0008 ADMIN INFLUENZA VIRUS VAC: HCPCS | Performed by: FAMILY MEDICINE

## 2023-03-14 PROCEDURE — 82043 UR ALBUMIN QUANTITATIVE: CPT

## 2023-03-14 PROCEDURE — 90694 VACC AIIV4 NO PRSRV 0.5ML IM: CPT | Performed by: FAMILY MEDICINE

## 2023-03-14 PROCEDURE — 3074F SYST BP LT 130 MM HG: CPT | Performed by: FAMILY MEDICINE

## 2023-03-14 PROCEDURE — G8484 FLU IMMUNIZE NO ADMIN: HCPCS | Performed by: FAMILY MEDICINE

## 2023-03-14 PROCEDURE — 80061 LIPID PANEL: CPT

## 2023-03-14 PROCEDURE — 3023F SPIROM DOC REV: CPT | Performed by: FAMILY MEDICINE

## 2023-03-14 PROCEDURE — G8399 PT W/DXA RESULTS DOCUMENT: HCPCS | Performed by: FAMILY MEDICINE

## 2023-03-14 PROCEDURE — 80053 COMPREHEN METABOLIC PANEL: CPT

## 2023-03-14 RX ORDER — FUROSEMIDE 20 MG/1
TABLET ORAL
Qty: 180 TABLET | Refills: 4 | Status: SHIPPED | OUTPATIENT
Start: 2023-03-14

## 2023-03-14 RX ORDER — MONTELUKAST SODIUM 10 MG/1
10 TABLET ORAL DAILY
Qty: 90 TABLET | Refills: 4 | Status: SHIPPED | OUTPATIENT
Start: 2023-03-14

## 2023-03-14 RX ORDER — ALBUTEROL SULFATE 90 UG/1
2 AEROSOL, METERED RESPIRATORY (INHALATION) EVERY 6 HOURS PRN
Qty: 18 G | Refills: 0 | Status: SHIPPED | OUTPATIENT
Start: 2023-03-14

## 2023-03-14 RX ORDER — BUDESONIDE AND FORMOTEROL FUMARATE DIHYDRATE 160; 4.5 UG/1; UG/1
AEROSOL RESPIRATORY (INHALATION)
Qty: 10.2 G | Refills: 12 | Status: SHIPPED | OUTPATIENT
Start: 2023-03-14

## 2023-03-14 RX ORDER — CETIRIZINE HYDROCHLORIDE 10 MG/1
TABLET ORAL
Qty: 90 TABLET | Refills: 4 | Status: SHIPPED | OUTPATIENT
Start: 2023-03-14

## 2023-03-14 RX ORDER — VALSARTAN 80 MG/1
80 TABLET ORAL DAILY
Qty: 90 TABLET | Refills: 4 | Status: SHIPPED | OUTPATIENT
Start: 2023-03-14

## 2023-03-14 RX ORDER — SIMVASTATIN 20 MG
20 TABLET ORAL NIGHTLY
Qty: 90 TABLET | Refills: 4 | Status: SHIPPED | OUTPATIENT
Start: 2023-03-14

## 2023-03-14 RX ORDER — GABAPENTIN 100 MG/1
100 CAPSULE ORAL 3 TIMES DAILY
Qty: 90 CAPSULE | Refills: 5 | Status: SHIPPED | OUTPATIENT
Start: 2023-03-14 | End: 2023-09-10

## 2023-03-14 RX ORDER — POTASSIUM CHLORIDE 750 MG/1
10 TABLET, EXTENDED RELEASE ORAL 2 TIMES DAILY
Qty: 180 TABLET | Refills: 4 | Status: SHIPPED | OUTPATIENT
Start: 2023-03-14

## 2023-03-14 RX ORDER — UMECLIDINIUM 62.5 UG/1
AEROSOL, POWDER ORAL
Qty: 1 EACH | Refills: 12 | Status: SHIPPED | OUTPATIENT
Start: 2023-03-14

## 2023-03-14 SDOH — ECONOMIC STABILITY: HOUSING INSECURITY
IN THE LAST 12 MONTHS, WAS THERE A TIME WHEN YOU DID NOT HAVE A STEADY PLACE TO SLEEP OR SLEPT IN A SHELTER (INCLUDING NOW)?: NO

## 2023-03-14 SDOH — ECONOMIC STABILITY: FOOD INSECURITY: WITHIN THE PAST 12 MONTHS, THE FOOD YOU BOUGHT JUST DIDN'T LAST AND YOU DIDN'T HAVE MONEY TO GET MORE.: NEVER TRUE

## 2023-03-14 SDOH — ECONOMIC STABILITY: INCOME INSECURITY: HOW HARD IS IT FOR YOU TO PAY FOR THE VERY BASICS LIKE FOOD, HOUSING, MEDICAL CARE, AND HEATING?: NOT HARD AT ALL

## 2023-03-14 SDOH — ECONOMIC STABILITY: FOOD INSECURITY: WITHIN THE PAST 12 MONTHS, YOU WORRIED THAT YOUR FOOD WOULD RUN OUT BEFORE YOU GOT MONEY TO BUY MORE.: NEVER TRUE

## 2023-03-14 ASSESSMENT — ANXIETY QUESTIONNAIRES
6. BECOMING EASILY ANNOYED OR IRRITABLE: 0
4. TROUBLE RELAXING: 0
IF YOU CHECKED OFF ANY PROBLEMS ON THIS QUESTIONNAIRE, HOW DIFFICULT HAVE THESE PROBLEMS MADE IT FOR YOU TO DO YOUR WORK, TAKE CARE OF THINGS AT HOME, OR GET ALONG WITH OTHER PEOPLE: NOT DIFFICULT AT ALL
2. NOT BEING ABLE TO STOP OR CONTROL WORRYING: 0
1. FEELING NERVOUS, ANXIOUS, OR ON EDGE: 0
3. WORRYING TOO MUCH ABOUT DIFFERENT THINGS: 0
7. FEELING AFRAID AS IF SOMETHING AWFUL MIGHT HAPPEN: 0
5. BEING SO RESTLESS THAT IT IS HARD TO SIT STILL: 0
GAD7 TOTAL SCORE: 0

## 2023-03-14 ASSESSMENT — PATIENT HEALTH QUESTIONNAIRE - PHQ9
SUM OF ALL RESPONSES TO PHQ QUESTIONS 1-9: 0
2. FEELING DOWN, DEPRESSED OR HOPELESS: 0
SUM OF ALL RESPONSES TO PHQ9 QUESTIONS 1 & 2: 0
SUM OF ALL RESPONSES TO PHQ QUESTIONS 1-9: 0
1. LITTLE INTEREST OR PLEASURE IN DOING THINGS: 0

## 2023-03-14 NOTE — PROGRESS NOTES
Raymundo Bazan is a 67 y.o. presents today for   Chief Complaint   Patient presents with    Medication Refill         Depression Screening:   PHQ-9 Questionaire 3/14/2023 9/14/2022 6/14/2022 3/7/2022 12/7/2021 9/29/2021   Little interest or pleasure in doing things 0 0 0 0 0 0   Feeling down, depressed, or hopeless 0 0 0 0 0 0   PHQ-9 Total Score 0 0 0 0 0 0       Abuse Screening: AMB Abuse Screening 3/14/2023   Do you ever feel afraid of your partner? N   Are you in a relationship with someone who physically or mentally threatens you? N   Is it safe for you to go home? Y       Learning Assessment:  No question data found. Fall Risk:  Fall Risk 3/14/2023   2 or more falls in past year? no   Fall with injury in past year? no           Coordination of Care:   1. \"Have you been to the ER, urgent care clinic since your last visit? Hospitalized since your last visit? \" no    2. \"Have you seen or consulted any other health care providers outside of the [address] [place] [address] since your last visit? \" no    3. For patients aged 39-70: Has the patient had a colonoscopy / FIT/ Cologuard? yes    If the patient is female:    4. For patients aged 41-77: Has the patient had a mammogram within the past 2 years? yes    5. For patients aged 21-65: Has the patient had a pap smear? no    Health Maintenance: reviewed and discussed and ordered per Provider.     Health Maintenance Due   Topic Date Due    Shingles vaccine (1 of 2) Never done    Diabetic retinal exam  01/23/2019    DTaP/Tdap/Td vaccine (2 - Td or Tdap) 10/24/2021    COVID-19 Vaccine (3 - Booster for Pfizer series) 11/17/2021    Flu vaccine (1) 08/01/2022    Diabetic Alb to Cr ratio (uACR) test  09/29/2022    Lipids  09/29/2022    GFR test (Diabetes, CKD 3-4, OR last GFR 15-59)  12/07/2022

## 2023-03-14 NOTE — PROGRESS NOTES
Victor Hugo Castro is a 67 y.o.  female and presents with    Chief Complaint   Patient presents with    Diarrhea       Subjective:  Ms. Whitley Braun c/o loose stool which she associates with milk; she cannot eat cereal due to this problem; she reports that canned soups with cream also cause loose stool and bloating. She reports that she cannot walk more than a block without getting out of breath. She denies chest pain. She wears a mask in the grocery store. She states that she needs oxygen; she has been to pulmonary medicine and was referred for pulmonary rehab. ROS    General ROS: negative for -  chills, fatigue or fever   Psychological ROS: negative for -  anxiety or depression; insomnia with hypersomnolence during the day   Ophthalmic ROS: positive for -  uses glasses   ENT ROS: positive for -  headaches, no  nasal congestion or sore throat; she continues to clear her throat   Endocrine ROS: negative for -  polydipsia/polyuria or temperature intolerance   Respiratory ROS: shortness of breath when she walks from her bed to the end of the bed; she uses albuterol with relief; she reports that wearing  a mask is more irritating and interferes with her breathing   Cardiovascular ROS: positive for -  improved dyspnea on exertion   negative for - chest pain   Gastrointestinal ROS: see HPI   Genito-Urinary ROS: no dysuria, trouble voiding, or hematuria   Musculoskeletal ROS: positive for -  pain in toe - left   Neurological ROS: negative for -  numbness/tingling   Dermatological ROS: positive for -  skin lesion changes     All other systems reviewed and are negative.       Objective:  /66 (Site: Left Upper Arm, Position: Sitting)   Pulse 77   Temp 97.8 °F (36.6 °C) (Temporal)   Resp 17   Ht 5' 2\" (1.575 m)   Wt 188 lb (85.3 kg)   SpO2 96%   BMI 34.39 kg/m²       alert, well appearing, and in no distress, oriented to person, place, and time, and obese  Mental status - normal mood, behavior,

## 2023-06-05 DIAGNOSIS — J43.1 PANLOBULAR EMPHYSEMA (HCC): ICD-10-CM

## 2023-06-05 RX ORDER — BUDESONIDE AND FORMOTEROL FUMARATE DIHYDRATE 160; 4.5 UG/1; UG/1
AEROSOL RESPIRATORY (INHALATION)
Qty: 33 G | Refills: 0 | Status: SHIPPED | OUTPATIENT
Start: 2023-06-05

## 2023-06-19 ENCOUNTER — HOSPITAL ENCOUNTER (OUTPATIENT)
Dept: WOMENS IMAGING | Facility: HOSPITAL | Age: 73
Discharge: HOME | End: 2023-06-22
Payer: MEDICARE

## 2023-06-19 DIAGNOSIS — Z12.31 VISIT FOR SCREENING MAMMOGRAM: ICD-10-CM

## 2023-06-19 PROCEDURE — 77063 BREAST TOMOSYNTHESIS BI: CPT

## 2023-07-12 DIAGNOSIS — R60.0 LOWER EXTREMITY EDEMA: ICD-10-CM

## 2023-07-12 DIAGNOSIS — I10 PRIMARY HYPERTENSION: ICD-10-CM

## 2023-07-12 DIAGNOSIS — E78.00 HYPERCHOLESTEROLEMIA: ICD-10-CM

## 2023-07-12 NOTE — TELEPHONE ENCOUNTER
PLEASE FORWARD TO PCP WITH MEDICATIONS ATTACHED TO MESSAGE. This patient contacted the office for the following prescriptions to be refilled. Patient states her pharmacy sent several faxes last week, but has not heard back from the office. Patient is out of these medications and would like them refilled as soon as possible:    Medication requested:     Drug Name:  Furosemide  Dosage -   20 mg    2. Drug Name:  Simvastatin  Dosage -   20 mg    3. Drug Name:  Linagliptin  Dosage -  5 mg    4. Drug Name:  Valsartan  Dosage -  80 mg    Pharmacy:   [address] [place] [address]   Phone:  [phone]    Fax:  [phone]    PCP: Vikram Moscoso MD  LOV: 3/14/2023  NOV DMA: 9/14/2023  FUTURE APPT:   Future Appointments   Date Time Provider Department   9/14/2023 10:00 AM Vikram Moscoso MD DMA   6/21/2024  1:15  [facility] 1 [address] [facility]     Thank you.

## 2023-07-14 DIAGNOSIS — E11.21 TYPE 2 DIABETES MELLITUS WITH DIABETIC NEPHROPATHY, WITHOUT LONG-TERM CURRENT USE OF INSULIN (HCC): ICD-10-CM

## 2023-07-14 DIAGNOSIS — R60.0 LOWER EXTREMITY EDEMA: ICD-10-CM

## 2023-07-14 DIAGNOSIS — I10 PRIMARY HYPERTENSION: ICD-10-CM

## 2023-07-14 DIAGNOSIS — E78.00 HYPERCHOLESTEROLEMIA: ICD-10-CM

## 2023-07-14 RX ORDER — VALSARTAN 80 MG/1
80 TABLET ORAL DAILY
Qty: 90 TABLET | Refills: 4 | Status: SHIPPED | OUTPATIENT
Start: 2023-07-14

## 2023-07-14 RX ORDER — FUROSEMIDE 20 MG/1
TABLET ORAL
Qty: 180 TABLET | Refills: 4 | Status: SHIPPED | OUTPATIENT
Start: 2023-07-14

## 2023-07-14 RX ORDER — SIMVASTATIN 20 MG
20 TABLET ORAL NIGHTLY
Qty: 90 TABLET | Refills: 4 | Status: SHIPPED | OUTPATIENT
Start: 2023-07-14

## 2023-08-25 DIAGNOSIS — J43.1 PANLOBULAR EMPHYSEMA (HCC): ICD-10-CM

## 2023-08-25 NOTE — TELEPHONE ENCOUNTER
PLEASE FORWARD TO PCP WITH MEDICATIONS ATTACHED TO MESSAGE. This patient contacted the office for the following prescriptions to be refilled:    Medication requested :     DrugName: SYMBICORT  Dosage- 160-4.5 MCG/ACT AERO    Pharmacy: [facility]    PCP: Glo Borja MD  LOV: 03/14/2023 (look in previous encounters if not listed)  NOV DMA: 9/14/2023  FUTURE APPT:   Future Appointments   Date Time Provider [address] [place] [address]   9/14/2023 10:00 AM Glo Borja MD [facility] BS AMB   6/21/2024  1:15  [facility] [address]         Thank you.

## 2023-08-26 RX ORDER — BUDESONIDE AND FORMOTEROL FUMARATE DIHYDRATE 160; 4.5 UG/1; UG/1
AEROSOL RESPIRATORY (INHALATION)
Qty: 33 G | Refills: 0 | Status: SHIPPED | OUTPATIENT
Start: 2023-08-26

## 2024-06-21 ENCOUNTER — HOSPITAL ENCOUNTER (OUTPATIENT)
Dept: WOMENS IMAGING | Facility: HOSPITAL | Age: 74
End: 2024-06-21
Payer: MEDICARE

## 2024-06-21 DIAGNOSIS — Z12.31 VISIT FOR SCREENING MAMMOGRAM: ICD-10-CM

## 2024-06-21 PROCEDURE — 77063 BREAST TOMOSYNTHESIS BI: CPT

## 2024-07-17 ENCOUNTER — HOSPITAL ENCOUNTER (OUTPATIENT)
Dept: WOMENS IMAGING | Facility: HOSPITAL | Age: 74
Discharge: HOME | End: 2024-07-20
Attending: FAMILY MEDICINE
Payer: MEDICARE

## 2024-07-17 ENCOUNTER — HOSPITAL ENCOUNTER (OUTPATIENT)
Facility: HOSPITAL | Age: 74
Discharge: HOME | End: 2024-07-20
Attending: FAMILY MEDICINE
Payer: MEDICARE

## 2024-07-17 DIAGNOSIS — R92.8 ABNORMAL FINDING ON BREAST IMAGING: ICD-10-CM

## 2024-07-17 PROCEDURE — 77065 DX MAMMO INCL CAD UNI: CPT

## 2024-07-17 PROCEDURE — 76642 ULTRASOUND BREAST LIMITED: CPT

## 2025-06-27 ENCOUNTER — HOSPITAL ENCOUNTER (OUTPATIENT)
Dept: WOMENS IMAGING | Facility: HOSPITAL | Age: 75
Discharge: HOME | End: 2025-06-30
Payer: MEDICARE

## 2025-06-27 DIAGNOSIS — Z12.31 VISIT FOR SCREENING MAMMOGRAM: ICD-10-CM

## 2025-06-27 PROCEDURE — 77063 BREAST TOMOSYNTHESIS BI: CPT
